# Patient Record
Sex: MALE | Race: WHITE | NOT HISPANIC OR LATINO | Employment: OTHER | URBAN - METROPOLITAN AREA
[De-identification: names, ages, dates, MRNs, and addresses within clinical notes are randomized per-mention and may not be internally consistent; named-entity substitution may affect disease eponyms.]

---

## 2022-03-08 ENCOUNTER — APPOINTMENT (OUTPATIENT)
Dept: CT IMAGING | Facility: HOSPITAL | Age: 77
DRG: 163 | End: 2022-03-08
Attending: EMERGENCY MEDICINE
Payer: MEDICARE

## 2022-03-08 ENCOUNTER — HOSPITAL ENCOUNTER (EMERGENCY)
Facility: HOSPITAL | Age: 77
Discharge: ANOTHER HOSPITAL | DRG: 163 | End: 2022-03-08
Attending: EMERGENCY MEDICINE | Admitting: STUDENT IN AN ORGANIZED HEALTH CARE EDUCATION/TRAINING PROGRAM
Payer: MEDICARE

## 2022-03-08 ENCOUNTER — APPOINTMENT (OUTPATIENT)
Dept: RADIOLOGY | Facility: HOSPITAL | Age: 77
DRG: 163 | End: 2022-03-08
Attending: EMERGENCY MEDICINE
Payer: MEDICARE

## 2022-03-08 ENCOUNTER — HOSPITAL ENCOUNTER (INPATIENT)
Facility: HOSPITAL | Age: 77
LOS: 13 days | Discharge: NURSING, REHAB OR LONG-TERM CARE | DRG: 163 | End: 2022-03-21
Attending: EMERGENCY MEDICINE | Admitting: HOSPITALIST
Payer: MEDICARE

## 2022-03-08 VITALS
TEMPERATURE: 99.1 F | BODY MASS INDEX: 24.11 KG/M2 | OXYGEN SATURATION: 93 % | SYSTOLIC BLOOD PRESSURE: 119 MMHG | HEART RATE: 85 BPM | RESPIRATION RATE: 16 BRPM | WEIGHT: 168 LBS | DIASTOLIC BLOOD PRESSURE: 61 MMHG

## 2022-03-08 DIAGNOSIS — G96.08 SUBDURAL HYGROMA: ICD-10-CM

## 2022-03-08 DIAGNOSIS — I10 HYPERTENSION, UNSPECIFIED TYPE: ICD-10-CM

## 2022-03-08 DIAGNOSIS — J90 PLEURAL EFFUSION: ICD-10-CM

## 2022-03-08 DIAGNOSIS — J90 PLEURAL EFFUSION ON LEFT: ICD-10-CM

## 2022-03-08 DIAGNOSIS — S01.91XA TRAUMATIC HEAD INJURY WITH MULTIPLE LACERATIONS, INITIAL ENCOUNTER: ICD-10-CM
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
## 2022-03-08 DIAGNOSIS — S01.01XA SCALP LACERATION, INITIAL ENCOUNTER: ICD-10-CM

## 2022-03-08 DIAGNOSIS — S09.90XA TRAUMATIC HEAD INJURY WITH MULTIPLE LACERATIONS, INITIAL ENCOUNTER: ICD-10-CM

## 2022-03-08 DIAGNOSIS — F41.9 ANXIETY: ICD-10-CM
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
## 2022-03-08 DIAGNOSIS — J86.9 EMPYEMA OF LEFT PLEURAL SPACE (H): ICD-10-CM

## 2022-03-08 DIAGNOSIS — S09.90XA HEAD INJURY, INITIAL ENCOUNTER: ICD-10-CM

## 2022-03-08 LAB
ALBUMIN SERPL-MCNC: 1.7 G/DL (ref 3.5–5)
ALP SERPL-CCNC: 94 U/L (ref 45–120)
ALT SERPL W P-5'-P-CCNC: 38 U/L (ref 0–45)
ANION GAP SERPL CALCULATED.3IONS-SCNC: 12 MMOL/L (ref 5–18)
APTT PPP: 32 SECONDS (ref 22–38)
AST SERPL W P-5'-P-CCNC: 46 U/L (ref 0–40)
BASOPHILS # BLD AUTO: 0 10E3/UL (ref 0–0.2)
BASOPHILS NFR BLD AUTO: 0 %
BILIRUB SERPL-MCNC: 0.5 MG/DL (ref 0–1)
BUN SERPL-MCNC: 9 MG/DL (ref 8–28)
CALCIUM SERPL-MCNC: 8.6 MG/DL (ref 8.5–10.5)
CHLORIDE BLD-SCNC: 105 MMOL/L (ref 98–107)
CO2 SERPL-SCNC: 20 MMOL/L (ref 22–31)
CREAT SERPL-MCNC: 0.68 MG/DL (ref 0.7–1.3)
EOSINOPHIL # BLD AUTO: 0 10E3/UL (ref 0–0.7)
EOSINOPHIL NFR BLD AUTO: 0 %
ERYTHROCYTE [DISTWIDTH] IN BLOOD BY AUTOMATED COUNT: 17.9 % (ref 10–15)
GFR SERPL CREATININE-BSD FRML MDRD: >90 ML/MIN/1.73M2
GLUCOSE BLD-MCNC: 129 MG/DL (ref 70–125)
HCT VFR BLD AUTO: 27.7 % (ref 40–53)
HGB BLD-MCNC: 8 G/DL (ref 13.3–17.7)
IMM GRANULOCYTES # BLD: 0.1 10E3/UL
IMM GRANULOCYTES NFR BLD: 1 %
INR PPP: 1.33 (ref 0.9–1.15)
LYMPHOCYTES # BLD AUTO: 0.9 10E3/UL (ref 0.8–5.3)
LYMPHOCYTES NFR BLD AUTO: 8 %
MCH RBC QN AUTO: 23.1 PG (ref 26.5–33)
MCHC RBC AUTO-ENTMCNC: 28.9 G/DL (ref 31.5–36.5)
MCV RBC AUTO: 80 FL (ref 78–100)
MONOCYTES # BLD AUTO: 0.8 10E3/UL (ref 0–1.3)
MONOCYTES NFR BLD AUTO: 7 %
NEUTROPHILS # BLD AUTO: 9.4 10E3/UL (ref 1.6–8.3)
NEUTROPHILS NFR BLD AUTO: 84 %
NRBC # BLD AUTO: 0 10E3/UL
NRBC BLD AUTO-RTO: 0 /100
PLATELET # BLD AUTO: 452 10E3/UL (ref 150–450)
POTASSIUM BLD-SCNC: 3.9 MMOL/L (ref 3.5–5)
PROCALCITONIN SERPL-MCNC: 0.11 NG/ML (ref 0–0.49)
PROT SERPL-MCNC: 7.7 G/DL (ref 6–8)
RBC # BLD AUTO: 3.46 10E6/UL (ref 4.4–5.9)
SARS-COV-2 RNA RESP QL NAA+PROBE: NEGATIVE
SODIUM SERPL-SCNC: 137 MMOL/L (ref 136–145)
WBC # BLD AUTO: 11.1 10E3/UL (ref 4–11)

## 2022-03-08 PROCEDURE — 85025 COMPLETE CBC W/AUTO DIFF WBC: CPT | Performed by: EMERGENCY MEDICINE

## 2022-03-08 PROCEDURE — 72125 CT NECK SPINE W/O DYE: CPT

## 2022-03-08 PROCEDURE — 12002 RPR S/N/AX/GEN/TRNK2.6-7.5CM: CPT

## 2022-03-08 PROCEDURE — 99223 1ST HOSP IP/OBS HIGH 75: CPT | Mod: AI | Performed by: HOSPITALIST

## 2022-03-08 PROCEDURE — C9803 HOPD COVID-19 SPEC COLLECT: HCPCS

## 2022-03-08 PROCEDURE — 70450 CT HEAD/BRAIN W/O DYE: CPT

## 2022-03-08 PROCEDURE — 99285 EMERGENCY DEPT VISIT HI MDM: CPT | Mod: 25

## 2022-03-08 PROCEDURE — 99285 EMERGENCY DEPT VISIT HI MDM: CPT

## 2022-03-08 PROCEDURE — 71045 X-RAY EXAM CHEST 1 VIEW: CPT

## 2022-03-08 PROCEDURE — 250N000013 HC RX MED GY IP 250 OP 250 PS 637: Performed by: HOSPITALIST

## 2022-03-08 PROCEDURE — 84145 PROCALCITONIN (PCT): CPT | Performed by: EMERGENCY MEDICINE

## 2022-03-08 PROCEDURE — 80053 COMPREHEN METABOLIC PANEL: CPT | Performed by: EMERGENCY MEDICINE

## 2022-03-08 PROCEDURE — 36415 COLL VENOUS BLD VENIPUNCTURE: CPT | Performed by: EMERGENCY MEDICINE

## 2022-03-08 PROCEDURE — 85610 PROTHROMBIN TIME: CPT | Performed by: EMERGENCY MEDICINE

## 2022-03-08 PROCEDURE — 87635 SARS-COV-2 COVID-19 AMP PRB: CPT | Performed by: EMERGENCY MEDICINE

## 2022-03-08 PROCEDURE — 0W9B30Z DRAINAGE OF LEFT PLEURAL CAVITY WITH DRAINAGE DEVICE, PERCUTANEOUS APPROACH: ICD-10-PCS | Performed by: RADIOLOGY

## 2022-03-08 PROCEDURE — 99207 PR CDG-CODE CATEGORY CHANGED: CPT | Performed by: HOSPITALIST

## 2022-03-08 PROCEDURE — 250N000009 HC RX 250: Performed by: EMERGENCY MEDICINE

## 2022-03-08 PROCEDURE — 0HQ0XZZ REPAIR SCALP SKIN, EXTERNAL APPROACH: ICD-10-PCS | Performed by: EMERGENCY MEDICINE

## 2022-03-08 PROCEDURE — 85730 THROMBOPLASTIN TIME PARTIAL: CPT | Performed by: EMERGENCY MEDICINE

## 2022-03-08 PROCEDURE — 250N000011 HC RX IP 250 OP 636: Performed by: EMERGENCY MEDICINE

## 2022-03-08 PROCEDURE — 120N000001 HC R&B MED SURG/OB

## 2022-03-08 RX ORDER — AMOXICILLIN 250 MG
1 CAPSULE ORAL 2 TIMES DAILY
Status: DISCONTINUED | OUTPATIENT
Start: 2022-03-08 | End: 2022-03-18

## 2022-03-08 RX ORDER — ONDANSETRON 2 MG/ML
4 INJECTION INTRAMUSCULAR; INTRAVENOUS EVERY 6 HOURS PRN
Status: DISCONTINUED | OUTPATIENT
Start: 2022-03-08 | End: 2022-03-08 | Stop reason: HOSPADM

## 2022-03-08 RX ORDER — ACETAMINOPHEN 325 MG/1
975 TABLET ORAL EVERY 8 HOURS
Status: DISCONTINUED | OUTPATIENT
Start: 2022-03-08 | End: 2022-03-08 | Stop reason: CLARIF

## 2022-03-08 RX ORDER — SERTRALINE HYDROCHLORIDE 25 MG/1
25 TABLET, FILM COATED ORAL DAILY
Status: ON HOLD | COMMUNITY
Start: 2022-01-27 | End: 2022-03-18

## 2022-03-08 RX ORDER — DILTIAZEM HYDROCHLORIDE 120 MG/1
240 CAPSULE, COATED, EXTENDED RELEASE ORAL DAILY
Status: DISCONTINUED | OUTPATIENT
Start: 2022-03-08 | End: 2022-03-08 | Stop reason: CLARIF

## 2022-03-08 RX ORDER — ISOSORBIDE MONONITRATE 30 MG/1
30 TABLET, EXTENDED RELEASE ORAL DAILY
Status: DISCONTINUED | OUTPATIENT
Start: 2022-03-08 | End: 2022-03-08 | Stop reason: CLARIF

## 2022-03-08 RX ORDER — LORAZEPAM 2 MG/1
2 TABLET ORAL
Status: ON HOLD | COMMUNITY
Start: 2020-12-28 | End: 2022-03-18

## 2022-03-08 RX ORDER — ACETAMINOPHEN 325 MG/1
975 TABLET ORAL EVERY 8 HOURS
Status: DISCONTINUED | OUTPATIENT
Start: 2022-03-08 | End: 2022-03-21 | Stop reason: HOSPADM

## 2022-03-08 RX ORDER — ONDANSETRON 4 MG/1
4 TABLET, ORALLY DISINTEGRATING ORAL EVERY 6 HOURS PRN
Status: DISCONTINUED | OUTPATIENT
Start: 2022-03-08 | End: 2022-03-08 | Stop reason: HOSPADM

## 2022-03-08 RX ORDER — METOPROLOL TARTRATE 25 MG/1
25 TABLET, FILM COATED ORAL 2 TIMES DAILY
Status: DISCONTINUED | OUTPATIENT
Start: 2022-03-08 | End: 2022-03-08 | Stop reason: CLARIF

## 2022-03-08 RX ORDER — AMOXICILLIN 250 MG
2 CAPSULE ORAL 2 TIMES DAILY
Status: DISCONTINUED | OUTPATIENT
Start: 2022-03-08 | End: 2022-03-18

## 2022-03-08 RX ORDER — AMLODIPINE BESYLATE 5 MG/1
5 TABLET ORAL DAILY
Status: DISCONTINUED | OUTPATIENT
Start: 2022-03-08 | End: 2022-03-15

## 2022-03-08 RX ORDER — ONDANSETRON 4 MG/1
4 TABLET, ORALLY DISINTEGRATING ORAL EVERY 6 HOURS PRN
Status: DISCONTINUED | OUTPATIENT
Start: 2022-03-08 | End: 2022-03-21 | Stop reason: HOSPADM

## 2022-03-08 RX ORDER — ATENOLOL 25 MG/1
25 TABLET ORAL DAILY
Status: ON HOLD | COMMUNITY
Start: 2021-02-23 | End: 2022-03-18

## 2022-03-08 RX ORDER — SERTRALINE HYDROCHLORIDE 25 MG/1
25 TABLET, FILM COATED ORAL DAILY
Status: DISCONTINUED | OUTPATIENT
Start: 2022-03-08 | End: 2022-03-21 | Stop reason: HOSPADM

## 2022-03-08 RX ORDER — BACITRACIN ZINC 500 [USP'U]/G
OINTMENT TOPICAL ONCE
Status: COMPLETED | OUTPATIENT
Start: 2022-03-08 | End: 2022-03-08

## 2022-03-08 RX ORDER — ONDANSETRON 2 MG/ML
4 INJECTION INTRAMUSCULAR; INTRAVENOUS EVERY 6 HOURS PRN
Status: DISCONTINUED | OUTPATIENT
Start: 2022-03-08 | End: 2022-03-21 | Stop reason: HOSPADM

## 2022-03-08 RX ORDER — LORAZEPAM 1 MG/1
2 TABLET ORAL
Status: DISCONTINUED | OUTPATIENT
Start: 2022-03-08 | End: 2022-03-21 | Stop reason: HOSPADM

## 2022-03-08 RX ORDER — AMLODIPINE BESYLATE 5 MG/1
5 TABLET ORAL DAILY
Status: ON HOLD | COMMUNITY
End: 2022-03-18

## 2022-03-08 RX ORDER — ATENOLOL 25 MG/1
25 TABLET ORAL DAILY
Status: DISCONTINUED | OUTPATIENT
Start: 2022-03-08 | End: 2022-03-21 | Stop reason: HOSPADM

## 2022-03-08 RX ADMIN — BACITRACIN ZINC 0.9 G: 500 OINTMENT TOPICAL at 04:04

## 2022-03-08 RX ADMIN — ATENOLOL 25 MG: 25 TABLET ORAL at 16:45

## 2022-03-08 RX ADMIN — LORAZEPAM 2 MG: 1 TABLET ORAL at 16:46

## 2022-03-08 RX ADMIN — EPINEPHRINE BITARTRATE 3 ML: 1 POWDER at 02:50

## 2022-03-08 RX ADMIN — ACETAMINOPHEN 975 MG: 325 TABLET ORAL at 16:45

## 2022-03-08 RX ADMIN — AMLODIPINE BESYLATE 5 MG: 5 TABLET ORAL at 16:45

## 2022-03-08 RX ADMIN — BACITRACIN ZINC 0.9 G: 500 OINTMENT TOPICAL at 02:50

## 2022-03-08 RX ADMIN — ACETAMINOPHEN 975 MG: 325 TABLET ORAL at 23:58

## 2022-03-08 ASSESSMENT — ACTIVITIES OF DAILY LIVING (ADL)
TOILETING_ISSUES: NO
VISION_MANAGEMENT: HOME
NUMBER_OF_TIMES_PATIENT_HAS_FALLEN_WITHIN_LAST_SIX_MONTHS: 1
COMMUNICATION: DIFFICULTY UNDERSTANDING
ADLS_ACUITY_SCORE: 12
ADLS_ACUITY_SCORE: 8
WALKING_OR_CLIMBING_STAIRS_DIFFICULTY: NO
DIFFICULTY_COMMUNICATING: YES
ADLS_ACUITY_SCORE: 12
WEAR_GLASSES_OR_BLIND: OTHER (SEE COMMENTS)
ADLS_ACUITY_SCORE: 6
ADLS_ACUITY_SCORE: 8
DOING_ERRANDS_INDEPENDENTLY_DIFFICULTY: NO
ADLS_ACUITY_SCORE: 8
CHANGE_IN_FUNCTIONAL_STATUS_SINCE_ONSET_OF_CURRENT_ILLNESS/INJURY: NO
DIFFICULTY_EATING/SWALLOWING: NO
DRESSING/BATHING_DIFFICULTY: NO
ADLS_ACUITY_SCORE: 8
HEARING_DIFFICULTY_OR_DEAF: NO
ADLS_ACUITY_SCORE: 8
ADLS_ACUITY_SCORE: 10
ADLS_ACUITY_SCORE: 8
FALL_HISTORY_WITHIN_LAST_SIX_MONTHS: YES
ADLS_ACUITY_SCORE: 12
ADLS_ACUITY_SCORE: 8
ADLS_ACUITY_SCORE: 10
ADLS_ACUITY_SCORE: 8
ADLS_ACUITY_SCORE: 8
CONCENTRATING,_REMEMBERING_OR_MAKING_DECISIONS_DIFFICULTY: YES
DEPENDENT_IADLS:: CLEANING;COOKING;LAUNDRY;SHOPPING;MEAL PREPARATION;MEDICATION MANAGEMENT;TRANSPORTATION
ADLS_ACUITY_SCORE: 8

## 2022-03-08 ASSESSMENT — ENCOUNTER SYMPTOMS: WOUND: 1

## 2022-03-08 NOTE — ED NOTES
"Patient is calm and cooperative sitting in recliner. Provided patient with a pop to drink and gave him a white stuffed lamb. Told patient that \"Mari\" (staff at group home) wanted to check in on him and wanted him to listen to hospital staff while he is here. Patient was happy and appreciative of the pop and lamb.     "

## 2022-03-08 NOTE — ED NOTES
See chart MRN: [id number] Tha Lazaro for any further information. Group submitted wrong face sheet on pt arrival. This is the correct chart

## 2022-03-08 NOTE — PHARMACY-ADMISSION MEDICATION HISTORY
Pharmacy Note - Admission Medication History    Pertinent Provider Information: information provided by the group home     ______________________________________________________________________    Prior To Admission (PTA) med list completed and updated in EMR.       PTA Med List   Medication Sig Last Dose     amLODIPine (NORVASC) 5 MG tablet Take 5 mg by mouth daily  3/7/2022 at Unknown time     atenolol (TENORMIN) 25 MG tablet Take 25 mg by mouth daily  3/7/2022 at Unknown time     LORazepam (ATIVAN) 2 MG tablet Take 2 mg by mouth daily at 4pm  3/7/2022 at Unknown time     sertraline (ZOLOFT) 25 MG tablet Take 25 mg by mouth daily  3/7/2022 at Unknown time       Information source(s): Caregiver and [facility]  Method of interview communication: phone    Summary of Changes to PTA Med List  No PTA medications on file prior to this encounter.    Allergies were reviewed, assessed, and updated with the patient.      Patient does not use any multi-dose medications prior to admission.    The information provided in this note is only as accurate as the sources available at the time of the update(s).    Thank you for the opportunity to participate in the care of this patient.    Cecilia Modi RPH  3/8/2022 10:43 AM

## 2022-03-08 NOTE — ED TRIAGE NOTES
BIBA [facility]. Pt comes from a group home. Pt was going to the bathroom when he fell and hit his head. Pt has a laceration on the top of his head and above the right eye. Hx of mitul. EMS reported no blood thinners but Xarelto is listed on pt's medication list. Trauma alert was called per dr. Lujan's advice.

## 2022-03-08 NOTE — H&P
.  [facility]    History and Physical - Hospitalist Service       Date of Admission:  3/8/2022    Assessment & Plan      Perry Betancourt is a 76 year old male admitted on 3/8/2022. He has history of hypertension, mood disorder presented from group home status post mechanical fall and laceration on his head.      Status post mechanical fall  Head injury secondary to fall  -Patient presented with mechanical fall from his group home.   CT scan of the head showed questionable subdural hygroma measuring up to 0.9 cm  versus prominence of subarachnoid space overlying the left parietal lobe.  CT C-spine  showed no acute fractures.   -Laceration on his head has been repaired in the ED.  -Place patient on fall precautions      Left-sided fluid effusion  -This was initially seen on the CT C-spine which showed pleural fluid filling the left lung apex  and was not incompletely imaged.  -Chest x-ray was obtained which showed moderate to marked left sided pleural fluid  collection .  -Patient declines any shortness of breath, saturations stable on room air and is completely  asymptomatic.  -Can assess in a.m., likely consider thoracentesis    Hypertension-stable  -Continue PTA amlodipine, atenolol    Mood disorder  -Continue PTA sertraline, ativan    Diet: Regular  DVT Prophylaxis: Pneumatic Compression Devices  Terry Catheter: Not present  Central Lines: None  Code Status:     Disposition Plan   Expected Discharge: 1-2 days.   Anticipated discharge location:  Awaiting care coordination huddle      The patient's care was discussed with the Bedside Nurse.    Ofelia Higuera MD  [facility]  ______________________________________________________________________    Chief Complaint   Fall     History is obtained from chart review    History of Present Illness   Perry Betancourt is a 76 year old male with history of hypertension, mood disorder who is a resident of a group home presented to  "ED secondary to fall and a head laceration.  Patient's group home sent wrong facesheet with wrong details of the patient, hence patient's ED documentation, labs and CT head, CT C-spine and chest x-ray can be reviewed under MRN [id number], under patient's name Tha Lazaro \"Shilo\" for today's visit.     Patient is a poor historian, also hard to understand speech.  Patient states he has tried to go to the bathroom and he fell and hit his head to the ground with the wound on top of his head.  Denies any loss of consciousness.  Denies any pain anywhere.  Denies any shortness of breath, fever, cough.  No abdominal pain, nausea or vomiting.  No diarrhea or constipation.  No any other injuries reported.    In ER, on arrival patient is hemodynamically stable.  Labs are unremarkable except for mild leukocytosis with WBC of 11.1.  Covid test was negative.  Chest x-ray showed moderate to marked left-sided pleural effusion.  Atelectasis involving remaining portions of the leg.    CT head showed no any acute traumatic intracranial process.  There is questionable subdural hygroma measuring up to 0.9 cm versus prominence of the subarachnoid space overlying the left parietal lobe.  CT C-spine showed no acute fractures.    Review of Systems    Limited due to pt being poor historian    Past Medical History    Hypertension, Mood disorder    Past Surgical History   Reviewed and non contributory    Social History   I have reviewed this patient's social history and updated it with pertinent information if needed.  Social History     Tobacco Use     Smoking status: Not on file     Smokeless tobacco: Not on file   Substance Use Topics     Alcohol use: Not on file     Drug use: Not on file       Family History   Reviewed and non contributory    Prior to Admission Medications   Prior to Admission Medications   Prescriptions Last Dose Informant Patient Reported? Taking?   LORazepam (ATIVAN) 2 MG tablet 3/7/2022 at Unknown time  Yes Yes "   Sig: Take 2 mg by mouth daily at 4pm    amLODIPine (NORVASC) 5 MG tablet 3/7/2022 at Unknown time  Yes Yes   Sig: Take 5 mg by mouth daily    atenolol (TENORMIN) 25 MG tablet 3/7/2022 at Unknown time  Yes Yes   Sig: Take 25 mg by mouth daily    sertraline (ZOLOFT) 25 MG tablet 3/7/2022 at Unknown time  Yes Yes   Sig: Take 25 mg by mouth daily       Facility-Administered Medications: None     Allergies   No Known Allergies    Physical Exam   Vital Signs:     BP: 109/56 Pulse: 88   Resp: 14 SpO2: 97 % O2 Device: None (Room air)    Weight: 0 lbs 0 oz    General:  Appears stated age, no acute distress. A&O x 3.  Skin: Laceration on head, covered in dressing.  HEENT:  Normocephalic, laceration +, s/p repair  Neck:  Supple.  Chest:  Breath sounds CTA and no increased work of breathing on room air.  Cardiovascular:  RRR. No peripheral edema.  Abdomen:  Soft, non-tender, non-distended.  Musculoskeletal:  Moves all four extremities. No muscle atrophy.  Neurological:  CN 2-12 grossly intact.    Data   Data reviewed today: I reviewed all medications, new labs and imaging results over the last 24 hours.      Chest Xray:                                                                   IMPRESSION: Moderate-marked left-sided pleural fluid collection with minimal to moderate amount of remaining aerated lung in the left perihilar region. Atelectasis involving the remaining portions of the lung. Right lung clear. Heart size is unable to be assessed due to pleural fluid. Normal pulmonary vascularity.    CT head:  IMPRESSION:  1.  No definite acute traumatic intracranial process. Question subdural hygroma measuring up to 0.9 cm versus prominence of the subarachnoid space overlying the left parietal lobe.  2.  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 If the patient is experiencing an acute, focal or ongoing neurologic deficit, an MRI may be indicated.  3.  Chronic senescent and presumed  microvascular ischemic changes, as above.    CT C spine:  IMPRESSION:  1.  No fracture or posttraumatic subluxation.  2.  Degenerative changes, as above.  3.  Pleural fluid filling the left lung apex, incompletely imaged.

## 2022-03-08 NOTE — CONSULTS
Care Management Initial Consult    General Information  Assessment completed with: Patient, Caregiver, Other (RN Osiris), pt, RN osiris, caregiver Alan  Type of CM/SW Visit: Initial Assessment    Primary Care Provider verified and updated as needed: Yes   Readmission within the last 30 days: no previous admission in last 30 days   Return Category: Progression of disease  Reason for Consult: discharge planning  Advance Care Planning: Advance Care Planning Reviewed: no concerns identified          Communication Assessment  Patient's communication style: spoken language (English or Bilingual)             Cognitive  Cognitive/Neuro/Behavioral:                        Living Environment:   People in home: alone     Current living Arrangements: group home      Able to return to prior arrangements: yes  Living Arrangement Comments: [facility]    Family/Social Support:  Care provided by: self, other (see comments) (meals, medications support from caregivers)  Provides care for: no one  Marital Status: Single  Facility resident(s)/Staff          Description of Support System: Supportive, Involved    Support Assessment: Adequate family and caregiver support, Adequate social supports    Current Resources:   Patient receiving home care services: No     Community Resources: Group Home  Equipment currently used at home: none  Supplies currently used at home: Hearing Aid Batteries    Employment/Financial:  Employment Status:          Financial Concerns: No concerns identified   Referral to Financial Worker: No       Lifestyle & Psychosocial Needs:  Social Determinants of Health     Tobacco Use: Not on file   Alcohol Use: Not on file   Financial Resource Strain: Not on file   Food Insecurity: Not on file   Transportation Needs: Not on file   Physical Activity: Not on file   Stress: Not on file   Social Connections: Not on file   Intimate Partner Violence: Not on file   Depression: Not on file   Housing Stability: Not on  "file       Functional Status:  Prior to admission patient needed assistance:   Dependent ADLs:: Independent  Dependent IADLs:: Cleaning, Cooking, Laundry, Shopping, Meal Preparation, Medication Management, Transportation  Assesssment of Functional Status: Not at baseline with ADL Functioning, Not at baseline with mobility, Not at  functional baseline    Mental Health Status:  Mental Health Status: No Current Concerns       Chemical Dependency Status:                Values/Beliefs:  Spiritual, Cultural Beliefs, Holiness Practices, Values that affect care:                 Additional Information:  JUDE assessed, lives at [facility]: [facility], independent w/showers and ambulatory; needs helps w/all other ADLs. Pt still works 4 days a week. Discharge planning w/house at [phone] to discuss transport, may need WC ride.    Pt has been declining, per Alan, caregiver at the house; normally does his own laundry and works, but they are having to help him more and more w/laundry and meals and medications. Pt is walking less and losing weight. To help pt calm now and distract him, he is offered pepsi, sandwich and a teddybear. Pt also likes looking at fish in a fish tank. Alan also indicates that pt like talking about his job and he calls Alan \"Mari\", and Alan is usually able to distract him by asking him if he wants to go to the hospital. Pt doesn't like being in the hospital.    Thuy Hatch RN        "

## 2022-03-08 NOTE — ED PROVIDER NOTES
NOTE WAS COPIED AND PASTED on 3/8/22 BECAUSE THE WRONG CHART WAS STARTED ON THIS PATIENT WITH THE WRONG NAME AND MRN. PATIENT WAS SEEN BY DR OROURKE. I WAS NOT INVOLVED IN HIS CARE.      NAME: Tha Lazaro  AGE: 71 year old male  YOB: 1950  MRN: [id number]  EVALUATION DATE & TIME: 3/8/2022  1:36 AM    PCP: Mickey Barclay    ED PROVIDER: Luis Orourke M.D.    Chief Complaint   Patient presents with     Fall     Head Laceration     FINAL IMPRESSION:  1. Fall, initial encounter    2. Head injury, initial encounter    3. Scalp laceration, initial encounter    4. Pleural effusion on left    5. Subdural hygroma      MEDICAL DECISION MAKIN:41 AM I met with the patient, obtained history, performed an initial exam, and discussed options and plan for diagnostics and treatment here in the ED. PPE: N95 mask and gloves  2:48 AM I rechecked and updated the patient.   3:16 AM I spoke with [facility] regarding patient.   3:55 AM The patient's laceration was stapled without complication. Discussed plans for discharge and patient was agreeable.     Patient was clinically assessed and consented to treatment. After assessment, medical decision making and workup were discussed with the patient. The patient was agreeable to plan for testing, workup, and treatment.  Pertinent Labs & Imaging studies reviewed. (See chart for details)     Tha Lazaro is a 71 year old male who presents with fall and head injury.   Differential diagnosis includes but not limited to intracranial hemorrhage, skull fracture, cephalhematoma, scalp laceration, mechanical fall.  Patient with mechanical fall night and is on Xarelto per his chart review for control atrial flutter.  Patient did not lose consciousness nor was a syncopal episode.  He denies any pain except from the top of his head where he has a wound.  Patient does have a contusion with laceration on top of the scalp where he must of struck the cabinet.   No evidence of galea and wound will be cleansed and plan for repair.  Due to the blood thinners patient will be sent for CT scan of the head and neck.  These are pending at this time and will plan to repair wound.  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  CT scan of the neck showed no fractures but did show a incidental finding of a left-sided pleural effusion.  Will get chest x-ray to better define this however patient has no tenderness on the chest nor any shortness of breath.  If this was hemothorax and acutely related to trauma and expect tenderness on that rib area and acute shortness of breath however patient does not have either.  Chest x-ray did show significant effusion on the left side.  He is not tender there and as documented lung sounds were diminished over there however likely more significant than exam shows.  He does report some decreased energy however this is significantly changed from chest x-ray at outside clinic on September 25.  Given his smoking history and COPD lung cancer and a metastatic effusion is a concern.  Additionally this could be infectious and the labs were obtained.  CBC did show slight leukocytosis otherwise expect with that much effusion white count would be higher.  Coagulation studies were slightly elevated consistent with the Xarelto and metabolic panel unremarkable.  COVID-19 was negative and will plan for admission for likely thoracentesis and work-up of incidental pleural effusion finding.  Patient discussed with hospitalist, Dr. Hernandez for admission.    0 minutes of critical care time    MEDICATIONS GIVEN IN THE EMERGENCY:  Medications   bacitracin ointment (0.9 g Topical Given 3/8/22 0250)   lidocaine/EPINEPHrine/tetracaine (LET) solution KIT 3  mL (3 mLs Topical Given 3/8/22 0250)   bacitracin ointment (0.9 g Topical Given 3/8/22 7776)       NEW PRESCRIPTIONS STARTED AT TODAY'S ER VISIT:  New Prescriptions    No medications on file          =================================================================    HPI    Patient information was obtained from: Patient    Use of : N/A        Tha Lazaro is a 71 year old male with a past medical history of CVA and atrial fibrillation with RVR, who presents to the ED via EMS for evaluation of a head laceration secondary to a fall.     Per immunization records, patient's last tetanus shot was in 2016.    Patient presents from a group home with complaints of a head laceration. He states he was trying to go to the bathroom when he fell, sustaining a laceration to the top of his head. Per triage note, EMS reported no anticoagulants but Xarelto is listed on his medication list. He denies loss of consciousness. Denies any other pain or complaints at this time.    REVIEW OF SYSTEMS   Review of Systems   Constitutional:        Positive for a fall   Skin: Positive for wound (head laceration).   All other systems reviewed and are negative.     PAST MEDICAL HISTORY:  Past Medical History:   Diagnosis Date     CVA (cerebral vascular accident) (H) 2005     Depression      Typical atrial flutter (H)        PAST SURGICAL HISTORY:  Past Surgical History:   Procedure Laterality Date     EP ABLATION ATRIAL FLUTTER N/A 9/23/2020    Procedure: EP Ablation Atrial Flutter;  Surgeon: Reyna Strange MD;  Location:  [facility]       CURRENT MEDICATIONS:    No current facility-administered medications for this encounter.    Current Outpatient Medications:      atorvastatin (LIPITOR) 80 MG tablet, Take 80 mg by mouth At Bedtime, Disp: , Rfl:      calcipotriene (DOVONOX) 0.005 % external cream, Apply topically every morning , Disp: , Rfl:      diltiazem ER COATED BEADS (CARDIZEM CD/CARTIA XT) 240 MG  24 hr capsule, Take 1 capsule (240 mg) by mouth daily, Disp: 30 capsule, Rfl: 0     isosorbide mononitrate (IMDUR) 30 MG 24 hr tablet, Take 1.5 tablets (45) by mouth every morning, Disp: 45 tablet, Rfl: 11     metoprolol tartrate (LOPRESSOR) 25 MG tablet, Take 1 tablet (25 mg) by mouth 2 times daily, Disp: 180 tablet, Rfl: 0     metoprolol tartrate (LOPRESSOR) 25 MG tablet, Take 0.5 tablets (12.5 mg) by mouth 2 times daily as needed (palpitations >30 minutes) This is in addition to 25 mg twice daily., Disp: 180 tablet, Rfl: 0     metroNIDAZOLE (METROGEL) 1 % external gel, Apply topically daily, Disp: , Rfl:      nitroGLYcerin (NITROSTAT) 0.4 MG sublingual tablet, For chest pain place 1 tablet under the tongue every 5 minutes for 3 doses. If symptoms persist 5 minutes after 1st dose call 911., Disp: 25 tablet, Rfl: 4     rivaroxaban ANTICOAGULANT (XARELTO) 20 MG TABS tablet, Take 1 tablet (20 mg) by mouth daily (with dinner), Disp: 30 tablet, Rfl: 0    ALLERGIES:  No Known Allergies    FAMILY HISTORY:  History reviewed. No pertinent family history.    SOCIAL HISTORY:   Social History     Socioeconomic History     Marital status: Single     Spouse name: Not on file     Number of children: Not on file     Years of education: Not on file     Highest education level: Not on file   Occupational History     Not on file   Tobacco Use     Smoking status: Former Smoker     Types: Cigarettes     Quit date: 2014     Years since quittin.1     Smokeless tobacco: Never Used   Substance and Sexual Activity     Alcohol use: Not Currently     Comment: Quit around      Drug use: Not on file     Sexual activity: Not on file   Other Topics Concern     Parent/sibling w/ CABG, MI or angioplasty before 65F 55M? Not Asked   Social History Narrative     Not on file     Social Determinants of Health     Financial Resource Strain: Not on file   Food Insecurity: Not on file   Transportation Needs: Not on file   Physical Activity:  Not on file   Stress: Not on file   Social Connections: Not on file   Intimate Partner Violence: Not on file   Housing Stability: Not on file       PHYSICAL EXAM:    Vitals: /65   Pulse 89   Temp 100.4  F (38  C) (Oral)   Resp 16   Wt 76.2 kg (168 lb)   SpO2 92%   BMI 24.11 kg/m     Physical Exam  Vitals and nursing note reviewed.   Constitutional:       General: He is not in acute distress.     Appearance: Normal appearance. He is normal weight. He is not ill-appearing or toxic-appearing.   HENT:      Head: Normocephalic. Contusion and laceration present.        Nose: Nose normal.      Mouth/Throat:      Mouth: Mucous membranes are moist.      Pharynx: Oropharynx is clear.   Eyes:      Extraocular Movements: Extraocular movements intact.      Pupils: Pupils are equal, round, and reactive to light.   Cardiovascular:      Rate and Rhythm: Normal rate and regular rhythm.      Heart sounds: Normal heart sounds.   Pulmonary:      Effort: Pulmonary effort is normal. No tachypnea, accessory muscle usage, prolonged expiration or respiratory distress.      Breath sounds: Examination of the left-middle field reveals decreased breath sounds. Examination of the left-lower field reveals decreased breath sounds. Decreased breath sounds present. No wheezing, rhonchi or rales.   Chest:      Chest wall: No tenderness.   Abdominal:      Palpations: Abdomen is soft.      Tenderness: There is no abdominal tenderness.   Musculoskeletal:         General: No signs of injury.      Cervical back: Normal range of motion and neck supple. No tenderness.   Skin:     General: Skin is warm and dry.      Capillary Refill: Capillary refill takes less than 2 seconds.      Coloration: Skin is not pale.      Findings: No erythema.   Neurological:      Mental Status: He is alert and oriented to person, place, and time. Mental status is at baseline.      Cranial Nerves: No cranial nerve deficit.      Coordination: Coordination normal.    Psychiatric:         Mood and Affect: Mood normal.        LAB:  All pertinent labs reviewed and interpreted.  Labs Ordered and Resulted from Time of ED Arrival to Time of ED Departure   INR - Abnormal       Result Value    INR 1.33 (*)    COMPREHENSIVE METABOLIC PANEL - Abnormal    Sodium 137      Potassium 3.9      Chloride 105      Carbon Dioxide (CO2) 20 (*)     Anion Gap 12      Urea Nitrogen 9      Creatinine 0.68 (*)     Calcium 8.6      Glucose 129 (*)     Alkaline Phosphatase 94      AST 46 (*)     ALT 38      Protein Total 7.7      Albumin 1.7 (*)     Bilirubin Total 0.5      GFR Estimate >90     CBC WITH PLATELETS AND DIFFERENTIAL - Abnormal    WBC Count 11.1 (*)     RBC Count 3.46 (*)     Hemoglobin 8.0 (*)     Hematocrit 27.7 (*)     MCV 80      MCH 23.1 (*)     MCHC 28.9 (*)     RDW 17.9 (*)     Platelet Count 452 (*)     % Neutrophils 84      % Lymphocytes 8      % Monocytes 7      % Eosinophils 0      % Basophils 0      % Immature Granulocytes 1      NRBCs per 100 WBC 0      Absolute Neutrophils 9.4 (*)     Absolute Lymphocytes 0.9      Absolute Monocytes 0.8      Absolute Eosinophils 0.0      Absolute Basophils 0.0      Absolute Immature Granulocytes 0.1      Absolute NRBCs 0.0     PARTIAL THROMBOPLASTIN TIME - Normal    aPTT 32     COVID-19 VIRUS (CORONAVIRUS) BY PCR - Normal    SARS CoV2 PCR Negative     PROCALCITONIN       RADIOLOGY:  XR Chest Port 1 View   Final Result   IMPRESSION: Moderate-marked left-sided pleural fluid collection with minimal to moderate amount of remaining aerated lung in the left perihilar region. Atelectasis involving the remaining portions of the lung. Right lung clear. Heart size is unable to be    assessed due to pleural fluid. Normal pulmonary vascularity.      CT Cervical Spine w/o Contrast   Final Result   IMPRESSION:   1.  No fracture or posttraumatic subluxation.   2.  Degenerative changes, as above.   3.  Pleural fluid filling the left lung apex, incompletely  imaged.      CT Head w/o Contrast   Final Result   IMPRESSION:   1.  No definite acute traumatic intracranial process. Question subdural hygroma measuring up to 0.9 cm versus prominence of the subarachnoid space overlying the left parietal lobe.   2.  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 If the patient is experiencing an acute, focal or ongoing neurologic deficit, an MRI may be indicated.   3.  Chronic senescent and presumed microvascular ischemic changes, as above.         Dr. Cristian Hussein discussed results with DR. JAKUB LUJAN on 03/08/2022 at 3:16 AM.          EKG:   None    PROCEDURES:   Procedures     PROCEDURE: Laceration Repair   INDICATIONS: Laceration   PROCEDURE PROVIDER: Dr Jose Alejandro Lujan   SITE:  Scalp   TYPE/SIZE: simple, clean and no foreign body visualized  4 cm (total length)   FUNCTIONAL ASSESSMENT: Distal sensation, circulation and motor intact   MEDICATION: 3 mLs of 1% Lidocaine with epinephrine   PREPARATION: irrigation with Normal saline   DEBRIDEMENT: no debridement   CLOSURE:  Wound was closed in   4 staples    Total number of sutures/staples placed: 4       I, Jaqueline Whitney, am serving as a scribe to document services personally performed by Dr. Luis Lujan  based on my observation and the provider's statements to me. I, Luis Lujan MD attest that Jaqueline Whitney is acting in a scribe capacity, has observed my performance of the services and has documented them in accordance with my direction.      Luis Lujan M.D.  Emergency Medicine  [facility] Emergency Department     Luis Lujan MD  03/08/22 0625         Rachel Strickland MD  03/08/22 3930

## 2022-03-08 NOTE — ED PROVIDER NOTES
NAME: Perry Betancourt  AGE: 76 year old male  YOB: 1945  MRN: [id number]  EVALUATION DATE & TIME: 3/8/2022  1:36 AM    PCP: Mickey Barclay    ED PROVIDER: Luis Lujan M.D.    Chief Complaint   Patient presents with     Fall     Head Laceration     FINAL IMPRESSION:  1. Fall, initial encounter    2. Head injury, initial encounter    3. Scalp laceration, initial encounter    4. Pleural effusion on left    5. Subdural hygroma    6. Subdural hygroma      MEDICAL DECISION MAKIN:41 AM I met with the patient, obtained history, performed an initial exam, and discussed options and plan for diagnostics and treatment here in the ED. PPE: N95 mask and gloves  2:48 AM I rechecked and updated the patient.   3:16 AM I spoke with [facility] regarding patient.   3:55 AM The patient's laceration was stapled without complication. Discussed plans for discharge and patient was agreeable.  3/31/22 Patient demographics corrected after chart verified.     Patient was clinically assessed and consented to treatment. After assessment, medical decision making and workup were discussed with the patient. The patient was agreeable to plan for testing, workup, and treatment.  Pertinent Labs & Imaging studies reviewed. (See chart for details)     Perry Betancourt is a 76 year old male who presents with fall and head injury.   Differential diagnosis includes but not limited to intracranial hemorrhage, skull fracture, cephalhematoma, scalp laceration, mechanical fall.  Patient with mechanical fall night and is on Xarelto per his chart review for control atrial flutter.  Patient did not lose consciousness nor was a syncopal episode.  He denies any pain except from the top of his head where he has a wound.  Patient does have a contusion with laceration on top of the scalp where he must of struck the cabinet.  No evidence of galea and wound will be cleansed and plan for repair.  Due to the blood thinners  patient will be sent for CT scan of the head and neck.  These are pending at this time and will plan to repair wound.  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  CT scan of the neck showed no fractures but did show a incidental finding of a left-sided pleural effusion.  Will get chest x-ray to better define this however patient has no tenderness on the chest nor any shortness of breath.  If this was hemothorax and acutely related to trauma and expect tenderness on that rib area and acute shortness of breath however patient does not have either.  Chest x-ray did show significant effusion on the left side.  He is not tender there and as documented lung sounds were diminished over there however likely more significant than exam shows.  He does report some decreased energy however this is significantly changed from chest x-ray at outside clinic on September 25.  Given his smoking history and COPD lung cancer and a metastatic effusion is a concern.  Additionally this could be infectious and the labs were obtained.  CBC did show slight leukocytosis otherwise expect with that much effusion white count would be higher.  Coagulation studies were slightly elevated consistent with the Xarelto and metabolic panel unremarkable.  COVID-19 was negative and will plan for admission for likely thoracentesis and work-up of incidental pleural effusion finding.  Patient discussed with hospitalist, Dr. Hernandez for admission.    0 minutes of critical care time    MEDICATIONS GIVEN IN THE EMERGENCY:  Medications   bacitracin ointment (0.9 g Topical Given 3/8/22 0250)   lidocaine/EPINEPHrine/tetracaine (LET) solution KIT 3 mL (3 mLs Topical Given 3/8/22 0250)   bacitracin ointment (0.9 g Topical Given 3/8/22 0404)        NEW PRESCRIPTIONS STARTED AT TODAY'S ER VISIT:  Current Discharge Medication List      START taking these medications    Details   acetaminophen (TYLENOL) 500 MG tablet Take 2 tablets (1,000 mg) by mouth every 8 hours as needed for mild pain    Associated Diagnoses: Fall, initial encounter      amLODIPine (NORVASC) 5 MG tablet Take 1 tablet (5 mg) by mouth daily  Qty: 30 tablet, Refills: 0    Associated Diagnoses: Hypertension, unspecified type      amoxicillin-clavulanate (AUGMENTIN) 875-125 MG tablet Take 1 tablet by mouth 2 times daily for 14 days  Qty: 28 tablet, Refills: 0    Associated Diagnoses: Empyema of left pleural space (H)      atenolol (TENORMIN) 25 MG tablet Take 1 tablet (25 mg) by mouth daily  Qty: 30 tablet, Refills: 0    Associated Diagnoses: Hypertension, unspecified type      LORazepam (ATIVAN) 2 MG tablet Take 1 tablet (2 mg) by mouth daily at 4pm  Qty: 60 tablet, Refills: 0    Associated Diagnoses: Anxiety      sertraline (ZOLOFT) 25 MG tablet Take 1 tablet (25 mg) by mouth daily  Qty: 30 tablet, Refills: 0    Associated Diagnoses: Anxiety                =================================================================    HPI    Patient information was obtained from: Patient    Use of : N/A        Perry Betancourt is a 76 year old male with a past medical history of CVA and atrial fibrillation with RVR, who presents to the ED via EMS for evaluation of a head laceration secondary to a fall.     Per immunization records, patient's last tetanus shot was in 2016.    Patient presents from a group home with complaints of a head laceration. He states he was trying to go to the bathroom when he fell, sustaining a laceration to the top of his head. Per triage note, EMS reported no anticoagulants but Xarelto is listed on his medication list. He denies loss of consciousness. Denies any other pain or complaints at this time.    REVIEW OF SYSTEMS   Review of Systems   Constitutional:         Positive for a fall   Skin: Positive for wound (head laceration).   All other systems reviewed and are negative.     PAST MEDICAL HISTORY:  No past medical history on file.    PAST SURGICAL HISTORY:  Past Surgical History:   Procedure Laterality Date     THORACOSCOPY Left 3/11/2022    Procedure: LEFT THORACOSCOPY, CONVERTED TO;  Surgeon: Perry Martell MD;  Location: [facility] OR     THORACOTOMY Left 3/11/2022    Procedure: LEFT THORACOTOMY WITH DECORTICATION;  Surgeon: Perry Martell MD;  Location: [facility]       CURRENT MEDICATIONS:    No current facility-administered medications for this encounter.    Current Outpatient Medications:      acetaminophen (TYLENOL) 500 MG tablet, Take 2 tablets (1,000 mg) by mouth every 8 hours as needed for mild pain, Disp: , Rfl:      amLODIPine (NORVASC) 5 MG tablet, Take 1 tablet (5 mg) by mouth daily, Disp: 30 tablet, Rfl: 0     amoxicillin-clavulanate (AUGMENTIN) 875-125 MG tablet, Take 1 tablet by mouth 2 times daily for 14 days, Disp: 28 tablet, Rfl: 0     atenolol (TENORMIN) 25 MG tablet, Take 1 tablet (25 mg) by mouth daily, Disp: 30 tablet, Rfl: 0     LORazepam (ATIVAN) 2 MG tablet, Take 1 tablet (2 mg) by mouth daily at 4pm, Disp: 60 tablet, Rfl: 0     sertraline (ZOLOFT) 25 MG tablet, Take 1 tablet (25 mg) by mouth daily, Disp: 30 tablet, Rfl: 0    ALLERGIES:  No Known Allergies    FAMILY HISTORY:  No family history on file.    SOCIAL HISTORY:   Social History     Socioeconomic History     Marital status: Single     Spouse name: Not on file     Number of children: Not on file     Years of education: Not on file     Highest education level: Not on file   Occupational History     Not on file   Tobacco Use     Smoking status: Former Smoker     Types: Cigarettes     Quit date: 2014     Years since quittin.1     Smokeless tobacco: Never Used   Substance and Sexual Activity     Alcohol use: Not Currently     Comment: Quit around      Drug use: Not  on file     Sexual activity: Not on file   Other Topics Concern     Parent/sibling w/ CABG, MI or angioplasty before 65F 55M? Not Asked   Social History Narrative     Not on file     Social Determinants of Health     Financial Resource Strain: Not on file   Food Insecurity: Not on file   Transportation Needs: Not on file   Physical Activity: Not on file   Stress: Not on file   Social Connections: Not on file   Intimate Partner Violence: Not on file   Housing Stability: Not on file       PHYSICAL EXAM:    Vitals: /61   Pulse 85   Temp 99.1  F (37.3  C) (Oral)   Resp 16   Wt 76.2 kg (168 lb)   SpO2 93%    Physical Exam  Vitals and nursing note reviewed.   Constitutional:       General: He is not in acute distress.     Appearance: Normal appearance. He is normal weight. He is not ill-appearing or toxic-appearing.   HENT:      Head: Normocephalic. Contusion and laceration present.        Nose: Nose normal.      Mouth/Throat:      Mouth: Mucous membranes are moist.      Pharynx: Oropharynx is clear.   Eyes:      Extraocular Movements: Extraocular movements intact.      Pupils: Pupils are equal, round, and reactive to light.   Cardiovascular:      Rate and Rhythm: Normal rate and regular rhythm.      Heart sounds: Normal heart sounds.   Pulmonary:      Effort: Pulmonary effort is normal. No tachypnea, accessory muscle usage, prolonged expiration or respiratory distress.      Breath sounds: Examination of the left-middle field reveals decreased breath sounds. Examination of the left-lower field reveals decreased breath sounds. Decreased breath sounds present. No wheezing, rhonchi or rales.   Chest:      Chest wall: No tenderness.   Abdominal:      Palpations: Abdomen is soft.      Tenderness: There is no abdominal tenderness.   Musculoskeletal:         General: No signs of injury.      Cervical back: Normal range of motion and neck supple. No tenderness.   Skin:     General: Skin is warm and dry.      Capillary  Refill: Capillary refill takes less than 2 seconds.      Coloration: Skin is not pale.      Findings: No erythema.   Neurological:      Mental Status: He is alert and oriented to person, place, and time. Mental status is at baseline.      Cranial Nerves: No cranial nerve deficit.      Coordination: Coordination normal.   Psychiatric:         Mood and Affect: Mood normal.        LAB:  All pertinent labs reviewed and interpreted.  Labs Ordered and Resulted from Time of ED Arrival to Time of ED Departure   INR - Abnormal       Result Value    INR 1.33 (*)    COMPREHENSIVE METABOLIC PANEL - Abnormal    Sodium 137      Potassium 3.9      Chloride 105      Carbon Dioxide (CO2) 20 (*)     Anion Gap 12      Urea Nitrogen 9      Creatinine 0.68 (*)     Calcium 8.6      Glucose 129 (*)     Alkaline Phosphatase 94      AST 46 (*)     ALT 38      Protein Total 7.7      Albumin 1.7 (*)     Bilirubin Total 0.5      GFR Estimate >90     CBC WITH PLATELETS AND DIFFERENTIAL - Abnormal    WBC Count 11.1 (*)     RBC Count 3.46 (*)     Hemoglobin 8.0 (*)     Hematocrit 27.7 (*)     MCV 80      MCH 23.1 (*)     MCHC 28.9 (*)     RDW 17.9 (*)     Platelet Count 452 (*)     % Neutrophils 84      % Lymphocytes 8      % Monocytes 7      % Eosinophils 0      % Basophils 0      % Immature Granulocytes 1      NRBCs per 100 WBC 0      Absolute Neutrophils 9.4 (*)     Absolute Lymphocytes 0.9      Absolute Monocytes 0.8      Absolute Eosinophils 0.0      Absolute Basophils 0.0      Absolute Immature Granulocytes 0.1      Absolute NRBCs 0.0     PARTIAL THROMBOPLASTIN TIME - Normal    aPTT 32     PROCALCITONIN - Normal    Procalcitonin 0.11     COVID-19 VIRUS (CORONAVIRUS) BY PCR - Normal    SARS CoV2 PCR Negative         RADIOLOGY:  XR Chest Port 1 View   Final Result   IMPRESSION: Moderate-marked left-sided pleural fluid collection with   minimal to moderate amount of remaining aerated lung in the left   perihilar region. Atelectasis involving  the remaining portions of the   lung. Right lung clear. Heart size is unable to be assessed due to   pleural fluid. Normal pulmonary vascularity.      CT Cervical Spine w/o Contrast   Final Result   IMPRESSION:   1.  No fracture or posttraumatic subluxation.   2.  Degenerative changes, as above.   3.  Pleural fluid filling the left lung apex, incompletely imaged.      CT Head w/o Contrast   Final Result   IMPRESSION:   1.  No definite acute traumatic intracranial process. Question   subdural hygroma measuring up to 0.9 cm versus prominence of the   subarachnoid space overlying the left parietal lobe.   2.  Age-indeterminate right posterior limb of internal capsule lacunar   type infarction. If the patient is experiencing an acute, focal or   ongoing neurologic deficit, an MRI may be indicated.   3.  Chronic senescent and presumed microvascular ischemic changes, as   above.      Dr. Cristian Hussein discussed results with DR. JAKUB OROURKE on 03/08/2022   at 3:16 AM.          EKG:   None    PROCEDURES:   Procedures     PROCEDURE: Laceration Repair   INDICATIONS: Laceration   PROCEDURE PROVIDER: Dr Jose Alejandro Orourke   SITE:  Scalp   TYPE/SIZE: simple, clean and no foreign body visualized  4 cm (total length)   FUNCTIONAL ASSESSMENT: Distal sensation, circulation and motor intact   MEDICATION: 3 mLs of 1% Lidocaine with epinephrine   PREPARATION: irrigation with Normal saline   DEBRIDEMENT: no debridement   CLOSURE:  Wound was closed in   4 staples    Total number of sutures/staples placed: 4       I, Jaqueline Whitney, am serving as a scribe to document services personally performed by Dr. Luis Orourke  based on my observation and the provider's statements to me. I, Luis Orourke MD attest that Jaqueline Whitney is acting in a scribe capacity, has observed my performance of the services and has documented them in accordance with my direction.      Luis Orourke M.D.  Emergency Medicine  [facility]  Department     Luis Lujan MD  03/08/22 8895       Luis Lujan MD  03/31/22 9541

## 2022-03-08 NOTE — ED NOTES
"ER Boarding Note:    Alert.  Follows commands.  Able to make some needs known.  Keeps asking, \"who's going to pick me up?\"  On RA.  No distress.  Sats are 93%.  HR 80s.  Denies chest pain.    Denies AP.  NPO ordered at this time.    Voided in bathroom.  Pt is SBA-A1.  Somewhat unsteady but manages well.  Wound to head: staples.  No active drainage.    VS obtained: /61   Pulse 85   Temp 99.1  F (37.3  C) (Oral)   Resp 16   Wt 76.2 kg (168 lb)   SpO2 93%   BMI 24.11 kg/m  .  Plan: waiting for admission bed.    "

## 2022-03-08 NOTE — ED NOTES
See overlapping chart MRN: [id number] for more detailed/ correct information    Nursing supervisor aware, group home sent wrong face sheet with pt. This is the wrong pt name

## 2022-03-08 NOTE — ED NOTES
[facility] ED Handoff Report    ED Chief Complaint:     ED Diagnosis:  No diagnosis found.     PMH:  No past medical history on file.     Code Status:  No Order     Falls Risk: Yes Band: Applied    Current Living Situation/Residence: lives in a group home     Elimination Status: Continent: Yes     Activity Level: SBA    Patients Preferred Language:  English     Needed: No    Vital Signs:  /56   Pulse 88   Resp 14   SpO2 97%      Cardiac Rhythm: NSR    Pain Score: 0/10    Is the Patient Confused:  Yes    Last Food or Drink: 03/08/22     Focused Assessment:  Patient arrived to ED due to fall and laceration on top of head. Patient is A/O x 1. Cooperative, likes pepsi, sandwiches, and sadie bears. patient has been sitting in recliner most of the time.     Tests Performed: Done: Labs and Imaging    Treatments Provided:  Staples for laceration    Family Dynamics/Concerns: No    Family Updated On Visitor Policy: Yes    Plan of Care Communicated to Family: Yes    Who Was Updated about Plan of Care: group home staff    Belongings Checklist Done and Signed by Patient: Yes    Medications sent with patient: None    Covid: asymptomatic , negative    Additional Information: Group home number to call for additional updates is [phone]    Marta Cr RN  3/8/2022 2:12 PM

## 2022-03-09 ENCOUNTER — APPOINTMENT (OUTPATIENT)
Dept: ULTRASOUND IMAGING | Facility: HOSPITAL | Age: 77
DRG: 163 | End: 2022-03-09
Attending: INTERNAL MEDICINE
Payer: MEDICARE

## 2022-03-09 ENCOUNTER — APPOINTMENT (OUTPATIENT)
Dept: PHYSICAL THERAPY | Facility: HOSPITAL | Age: 77
DRG: 163 | End: 2022-03-09
Attending: HOSPITALIST
Payer: MEDICARE

## 2022-03-09 ENCOUNTER — APPOINTMENT (OUTPATIENT)
Dept: OCCUPATIONAL THERAPY | Facility: HOSPITAL | Age: 77
DRG: 163 | End: 2022-03-09
Attending: HOSPITALIST
Payer: MEDICARE

## 2022-03-09 ENCOUNTER — APPOINTMENT (OUTPATIENT)
Dept: RADIOLOGY | Facility: HOSPITAL | Age: 77
DRG: 163 | End: 2022-03-09
Attending: INTERNAL MEDICINE
Payer: MEDICARE

## 2022-03-09 LAB
ANION GAP SERPL CALCULATED.3IONS-SCNC: 8 MMOL/L (ref 5–18)
BASOPHILS # BLD AUTO: 0 10E3/UL (ref 0–0.2)
BASOPHILS NFR BLD AUTO: 0 %
BUN SERPL-MCNC: 9 MG/DL (ref 8–28)
CALCIUM SERPL-MCNC: 7.9 MG/DL (ref 8.5–10.5)
CHLORIDE BLD-SCNC: 108 MMOL/L (ref 98–107)
CO2 SERPL-SCNC: 25 MMOL/L (ref 22–31)
CREAT SERPL-MCNC: 0.64 MG/DL (ref 0.7–1.3)
EOSINOPHIL # BLD AUTO: 0.1 10E3/UL (ref 0–0.7)
EOSINOPHIL NFR BLD AUTO: 1 %
ERYTHROCYTE [DISTWIDTH] IN BLOOD BY AUTOMATED COUNT: 17.8 % (ref 10–15)
GFR SERPL CREATININE-BSD FRML MDRD: >90 ML/MIN/1.73M2
GLUCOSE BLD-MCNC: 109 MG/DL (ref 70–125)
HCT VFR BLD AUTO: 27.5 % (ref 40–53)
HGB BLD-MCNC: 7.8 G/DL (ref 13.3–17.7)
IMM GRANULOCYTES # BLD: 0.1 10E3/UL
IMM GRANULOCYTES NFR BLD: 1 %
LDH SERPL L TO P-CCNC: 314 U/L (ref 125–220)
LYMPHOCYTES # BLD AUTO: 1 10E3/UL (ref 0.8–5.3)
LYMPHOCYTES NFR BLD AUTO: 10 %
MCH RBC QN AUTO: 23.6 PG (ref 26.5–33)
MCHC RBC AUTO-ENTMCNC: 28.4 G/DL (ref 31.5–36.5)
MCV RBC AUTO: 83 FL (ref 78–100)
MONOCYTES # BLD AUTO: 0.9 10E3/UL (ref 0–1.3)
MONOCYTES NFR BLD AUTO: 8 %
NEUTROPHILS # BLD AUTO: 8.2 10E3/UL (ref 1.6–8.3)
NEUTROPHILS NFR BLD AUTO: 80 %
NRBC # BLD AUTO: 0 10E3/UL
NRBC BLD AUTO-RTO: 0 /100
PLATELET # BLD AUTO: 437 10E3/UL (ref 150–450)
POTASSIUM BLD-SCNC: 3.5 MMOL/L (ref 3.5–5)
PROT SERPL-MCNC: 7.3 G/DL (ref 6–8)
RBC # BLD AUTO: 3.31 10E6/UL (ref 4.4–5.9)
SODIUM SERPL-SCNC: 141 MMOL/L (ref 136–145)
WBC # BLD AUTO: 10.2 10E3/UL (ref 4–11)

## 2022-03-09 PROCEDURE — 97162 PT EVAL MOD COMPLEX 30 MIN: CPT | Mod: GP

## 2022-03-09 PROCEDURE — 120N000001 HC R&B MED SURG/OB

## 2022-03-09 PROCEDURE — 258N000003 HC RX IP 258 OP 636: Performed by: STUDENT IN AN ORGANIZED HEALTH CARE EDUCATION/TRAINING PROGRAM

## 2022-03-09 PROCEDURE — 250N000011 HC RX IP 250 OP 636: Performed by: HOSPITALIST

## 2022-03-09 PROCEDURE — 85025 COMPLETE CBC W/AUTO DIFF WBC: CPT | Performed by: HOSPITALIST

## 2022-03-09 PROCEDURE — 87205 SMEAR GRAM STAIN: CPT | Performed by: INTERNAL MEDICINE

## 2022-03-09 PROCEDURE — 97116 GAIT TRAINING THERAPY: CPT | Mod: GP

## 2022-03-09 PROCEDURE — 84155 ASSAY OF PROTEIN SERUM: CPT | Performed by: INTERNAL MEDICINE

## 2022-03-09 PROCEDURE — 99233 SBSQ HOSP IP/OBS HIGH 50: CPT | Performed by: INTERNAL MEDICINE

## 2022-03-09 PROCEDURE — 97166 OT EVAL MOD COMPLEX 45 MIN: CPT | Mod: GO

## 2022-03-09 PROCEDURE — 36415 COLL VENOUS BLD VENIPUNCTURE: CPT | Performed by: INTERNAL MEDICINE

## 2022-03-09 PROCEDURE — 250N000013 HC RX MED GY IP 250 OP 250 PS 637: Performed by: HOSPITALIST

## 2022-03-09 PROCEDURE — 87070 CULTURE OTHR SPECIMN AEROBIC: CPT | Performed by: INTERNAL MEDICINE

## 2022-03-09 PROCEDURE — 272N000706 US THORACENTESIS

## 2022-03-09 PROCEDURE — 82310 ASSAY OF CALCIUM: CPT | Performed by: HOSPITALIST

## 2022-03-09 PROCEDURE — 71045 X-RAY EXAM CHEST 1 VIEW: CPT

## 2022-03-09 PROCEDURE — 97535 SELF CARE MNGMENT TRAINING: CPT | Mod: GO

## 2022-03-09 PROCEDURE — 89050 BODY FLUID CELL COUNT: CPT | Performed by: INTERNAL MEDICINE

## 2022-03-09 PROCEDURE — 36415 COLL VENOUS BLD VENIPUNCTURE: CPT | Performed by: HOSPITALIST

## 2022-03-09 PROCEDURE — 83615 LACTATE (LD) (LDH) ENZYME: CPT | Performed by: INTERNAL MEDICINE

## 2022-03-09 RX ADMIN — SERTRALINE HYDROCHLORIDE 25 MG: 25 TABLET ORAL at 09:41

## 2022-03-09 RX ADMIN — DOCUSATE SODIUM 50 MG AND SENNOSIDES 8.6 MG 2 TABLET: 8.6; 5 TABLET, FILM COATED ORAL at 22:04

## 2022-03-09 RX ADMIN — ACETAMINOPHEN 975 MG: 325 TABLET ORAL at 09:41

## 2022-03-09 RX ADMIN — SODIUM CHLORIDE 500 ML: 9 INJECTION, SOLUTION INTRAVENOUS at 03:56

## 2022-03-09 RX ADMIN — ONDANSETRON 4 MG: 4 TABLET, ORALLY DISINTEGRATING ORAL at 16:29

## 2022-03-09 RX ADMIN — LORAZEPAM 2 MG: 1 TABLET ORAL at 16:29

## 2022-03-09 RX ADMIN — DOCUSATE SODIUM 50 MG AND SENNOSIDES 8.6 MG 1 TABLET: 8.6; 5 TABLET, FILM COATED ORAL at 09:42

## 2022-03-09 RX ADMIN — ACETAMINOPHEN 975 MG: 325 TABLET ORAL at 23:35

## 2022-03-09 RX ADMIN — ACETAMINOPHEN 975 MG: 325 TABLET ORAL at 16:29

## 2022-03-09 ASSESSMENT — ACTIVITIES OF DAILY LIVING (ADL)
ADLS_ACUITY_SCORE: 12

## 2022-03-09 NOTE — PLAN OF CARE
Goal Outcome Evaluation:      Pt is A&O to self, admitted from group home after af fall with scalp lac. Pt denied pain. Has not used his call light, frequent checks by staff. Pt denied SOB, has occasional dry cough, LS clear/diminished, RR reg, sats upper 90's on RA. Pt has good appetite, ate 100% of roast beef, mashed potatoes and salad. Taking sips. Pt is continent, voided x1 this shift. Pt also requested to ambulate on unit, escorted by staff using transfer belt and walker, pt is stable on his feet. HS cares provided and pt tucked in around 9pm  Problem: Plan of Care - These are the overarching goals to be used throughout the patient stay.    Goal: Optimal Comfort and Wellbeing  Outcome: Ongoing, Progressing     Problem: Risk for Delirium  Goal: Improved Behavioral Control  Outcome: Ongoing, Progressing  Goal: Improved Attention and Thought Clarity  Outcome: Ongoing, Progressing     Problem: Fall Injury Risk  Goal: Absence of Fall and Fall-Related Injury  Outcome: Ongoing, Progressing  Intervention: Promote Injury-Free Environment  Recent Flowsheet Documentation  Taken 3/8/2022 1633 by Carly Barr RN  Safety Promotion/Fall Prevention:   activity supervised   assistive device/personal items within reach   bed alarm on   chair alarm on   increased rounding and observation   nonskid shoes/slippers when out of bed   patient and family education   room door open   supervised activity   toileting scheduled     Problem: Pain Acute  Goal: Acceptable Pain Control and Functional Ability  Outcome: Ongoing, Progressing   Denied pain  Problem: Plan of Care - These are the overarching goals to be used throughout the patient stay.    Goal: Absence of Hospital-Acquired Illness or Injury  Intervention: Identify and Manage Fall Risk  Recent Flowsheet Documentation  Taken 3/8/2022 1633 by Carly Barr RN  Safety Promotion/Fall Prevention:   activity supervised   assistive device/personal items within reach   bed alarm on    chair alarm on   increased rounding and observation   nonskid shoes/slippers when out of bed   patient and family education   room door open   supervised activity   toileting scheduled  Intervention: Prevent Skin Injury  Recent Flowsheet Documentation  Taken 3/8/2022 2024 by Carly Barr, RN  Body Position: supine  Intervention: Prevent and Manage VTE (Venous Thromboembolism) Risk  Recent Flowsheet Documentation  Taken 3/8/2022 1800 by Carly Barr, RN  Activity Management:   ambulated in haro   up in chair  Taken 3/8/2022 1633 by Carly Barr, RN  Activity Management: up in chair   Bed/ chair alarm in place

## 2022-03-09 NOTE — UTILIZATION REVIEW
Inpatient appropriate    Admission Status; Secondary Review Determination       Under the authority of the Utilization Management Committee, the utilization review process indicated a secondary review on the above patient. The review outcome is based on review of the medical records, discussions with staff, and applying clinical experience noted on the date of the review.     (x) Inpatient Status Appropriate - This patient's medical care is consistent with medical management for inpatient care and reasonable inpatient medical practice.     RATIONALE FOR DETERMINATION     76 year old male admitted on 3/8/2022. He has history of hypertension, mood disorder presented from group home status post mechanical fall and laceration on his head.  On admission CT showed pleural fluid on the left lung [place].  Chest x-ray show moderate to marked left-sided pleural effusion.  Patient need thoracentesis later today.  Will remain hospitalized greater than 2 midnights     At the time of admission with the information available to the attending physician more than 2 nights Hospital complex care was anticipated, based on patient risk of adverse outcome if treated as outpatient and complex care required. Inpatient admission is appropriate based on the Medicare guidelines.     The information on this document is developed by the utilization review team in order for the business office to ensure compliance. This only denotes the appropriateness of proper admission status and does not reflect the quality of care rendered.   The definitions of Inpatient Status and Observation Status used in making the determination above are those provided in the CMS Coverage Manual, Chapter 1 and Chapter 6, section 70.4.   Sincerely,   Blaine King MD  Utilization Review  Physician Advisor  [facility].

## 2022-03-09 NOTE — PROGRESS NOTES
03/09/22 0800   Quick Adds   Type of Visit Initial Occupational Therapy Evaluation   Living Environment   People in Home facility resident   Current Living Arrangements group home   Living Environment Comments Pt lives in a group home, walk in shower, no stairs   Self-Care   Usual Activity Tolerance moderate   Current Activity Tolerance fair   Fall history within last six months yes   Number of times patient has fallen within last six months 1   Activity/Exercise/Self-Care Comment Per group home rep, pt was IND with ADLs up until 4 months ago. Group home staff is able to provide extra assist as needed.    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 He has history of hypertension, mood disorder presented from group home status post mechanical fall and laceration on his head.   Existing Precautions/Restrictions fall   Limitations/Impairments hearing;safety/cognitive   Cognitive Status Examination   Orientation Status place;person   Affect/Mental Status (Cognitive) confused;flat/blunted affect   Follows Commands follows one-step commands;50-74% accuracy   Executive Function Deficit moderate deficit;organization/sequencing;planning/decision-making;problem-solving/reasoning;self-monitoring/self-correction   Visual Perception   Visual Impairment/Limitations near/reading vision impaired   Sensory   Sensory Quick Adds No deficits were identified   Pain Assessment   Patient Currently in Pain Yes, see Vital Sign flowsheet   Integumentary/Edema   Integumentary/Edema no deficits were identifed   Posture   Posture forward head  position   Range of Motion Comprehensive   Comment, General Range of Motion Pt is generally deconditioned   Strength Comprehensive (MMT)   Comment, General Manual Muscle Testing (MMT) Assessment Pt is generally deconditioned   Coordination   Functional Limitations Fine motor ADL performance impaired   Transfers   Transfers sit-stand transfer   Sit-Stand Transfer   Sit-Stand [place] (Transfers) supervision;verbal cues;nonverbal cues (demo/gesture)   Assistive Device (Sit-Stand Transfers) walker, front-wheeled   Balance   Balance Assessment standing balance: dynamic;identified impairments contributing to balance disturbance   Balance Comments Pt demonstrates shuffling gait, imrpoved with FWW   Activities of Daily Living   BADL Assessment/Intervention lower body dressing;grooming   Lower Body Dressing Assessment/Training   [place] Level (Lower Body Dressing) minimum assist (75% patient effort);verbal cues;nonverbal cues (demo/gesture)   Position (Lower Body Dressing) supported sitting   Grooming Assessment/Training   [place] Level (Grooming) supervision;set up;verbal cues;nonverbal cues (demo/gesture)   Position (Grooming) supported standing   Clinical Impression   Criteria for Skilled Therapeutic Interventions Met (OT) Yes, treatment indicated   OT Diagnosis ADL dysfunciton   OT Problem List-Impairments impacting ADL activity tolerance impaired;balance;cognition;communication;strength   Assessment of Occupational Performance 3-5 Performance Deficits   Identified Performance Deficits Dressing, toileting, bathing   Planned Therapy Interventions (OT) ADL retraining;balance training;strengthening;transfer training   Clinical Decision Making Complexity (OT) moderate complexity   Anticipated Equipment Needs Upon Discharge (OT) bathing equipment;walker, rolling;raised toilet seat;shower chair   Risk & Benefits of therapy have been explained evaluation/treatment results reviewed;care plan/treatment goals  reviewed;risks/benefits reviewed;current/potential barriers reviewed;participants voiced agreement with care plan;participants included;patient   OT Discharge Planning   OT Discharge Recommendation (DC Rec) home with assist;home with home care occupational therapy   OT Rationale for DC Rec Pt is below baseline but has support at group home. Salvatoreketan anticipates pt can safely dc home with assist/supervision and AE once medically ready   Total Evaluation Time (Minutes)   Total Evaluation Time (Minutes) 10   OT Goals   Therapy Frequency (OT) 5 times/wk   OT Predicated Duration/Target Date for Goal Attainment 03/16/22   OT Goals Hygiene/Grooming;Upper Body Dressing;Lower Body Dressing;Toilet Transfer/Toileting;Transfers   OT: Hygiene/Grooming supervision/stand-by assist;while standing   OT: Upper Body Dressing Supervision/stand-by assist;including set-up/clothing retrieval   OT: Lower Body Dressing Supervision/stand-by assist;using adaptive equipment;including set-up/clothing retrieval   OT: Transfer Supervision/stand-by assist;with assistive device   OT: Toilet Transfer/Toileting Supervision/stand-by assist;toilet transfer;cleaning and garment management;using adaptive equipment

## 2022-03-09 NOTE — PROGRESS NOTES
"Care Management Follow Up    Length of Stay (days): 1    Expected Discharge Date: 03/10/2022     Concerns to be Addressed:  Diagnostic work up      Patient plan of care discussed at interdisciplinary rounds: Yes    Anticipated Discharge Disposition:  Group home     Anticipated Discharge Services:  Home care  Anticipated Discharge DME:  walker    Patient/family educated on Medicare website which has current facility and service quality ratings:    Education Provided on the Discharge Plan:    Patient/Family in Agreement with the Plan:      Referrals Placed by CM/SW:  Home care PT/OT       Additional Information:  Patient admitted for fall, head injury, left sided fluid effusion.    Recommendations is to return to group [facility] with assist and home therapy.    Social History:  Patient lives at [facility]: [facility], independent w/showers and ambulatory; needs helps w/all other ADLs. Pt still works 4 days a week. Discharge planning w/house at [phone] to discuss transport, may need WC ride.    Pt has been declining, per Alan, caregiver at the house; normally does his own laundry and works, but they are having to help him more and more w/laundry and meals and medications. Pt is walking less and losing weight. To help pt calm now and distract him, he is offered pepsi, sandwich and a teddybear. Pt also likes looking at fish in a fish tank. Alan also indicates that pt like talking about his job and he calls Alan \"Mari\", and Alan is usually able to distract him by asking him if he wants to go to the hospital. Pt doesn't like being in the hospital.    Spoke with group [facility]. She confirms above and states patient does not have a walker at home. PT is recommending walker at discharge. She states concerns of patient decline in over all health. She reports patient's brother-Gaurav is main family contact but she is not sure if he is legal guardian.  TRE left message with Gaurav requesting call back and " clarification. Home care referrals faxed.     3:55 PM Patient's brother Gaurav called back. He was unaware of hospitalization. He states he and his spouse are legal guardians. He has paperwork which he states he will fax to the care management department.     Aleja Tang RN

## 2022-03-09 NOTE — PLAN OF CARE
Problem: Plan of Care - These are the overarching goals to be used throughout the patient stay.    Goal: Optimal Comfort and Wellbeing  Outcome: Ongoing, Progressing     Problem: Risk for Delirium  Goal: Improved Attention and Thought Clarity  Outcome: Ongoing, Progressing     Problem: Fall Injury Risk  Goal: Absence of Fall and Fall-Related Injury  Outcome: Ongoing, Progressing  Intervention: Identify and Manage Contributors  Recent Flowsheet Documentation  Taken 3/9/2022 0930 by Loretta Orona, RN  Medication Review/Management: medications reviewed  Intervention: Promote Injury-Free Environment  Recent Flowsheet Documentation  Taken 3/9/2022 0930 by Loretta Orona, RN  Safety Promotion/Fall Prevention: activity supervised     Problem: Pain Acute  Goal: Acceptable Pain Control and Functional Ability  Outcome: Ongoing, Progressing  Intervention: Prevent or Manage Pain  Recent Flowsheet Documentation  Taken 3/9/2022 0930 by Loretta Orona, RN  Medication Review/Management: medications reviewed   Goal Outcome Evaluation:        Patient denies any pain or discomfort. Noted staples to top of head lightly covered with gauze and tape. Orientated to person. Plan is to have thoracentesis this afternoon @1530.

## 2022-03-09 NOTE — PROGRESS NOTES
[facility]    Medicine Progress Note - Hospitalist Service    Date of Admission:  3/8/2022    Assessment & Plan           Assessment & Plan     Perry Betancourt is a 76 year old male admitted on 3/8/2022. He has history of hypertension and mood disorder presented from group home due to mechanical fall and laceration on his head. CT C-spine showed pleural fluid filling the left lung apex. Chest x-ray reportedly showed moderate to marked left sided pleural fluid  collection.     Discussed with Erin and patient's Guardian, Gaurav ([phone]); both agreed with plan to proceed with left thoracentesis.        Status post mechanical fall  Head injury secondary to fall  -Patient presented with mechanical fall from his group home.   CT scan of the head showed questionable subdural hygroma measuring up to 0.9 cm  versus prominence of subarachnoid space overlying the left parietal lobe.  CT C-spine  showed no acute fractures.   -Laceration on his head has been repaired in the ED.  -Fall precautions        Left-sided fluid effusion  -Breathing comfortably on room air   -This was initially seen on the CT C-spine which showed pleural fluid filling the left lung apex  and was not incompletely imaged.  -Chest x-ray was obtained which showed moderate to marked left sided pleural fluid  collection .  -Patient declines any shortness of breath, saturations stable on room air and is completely  asymptomatic.  -Repeat CXR showed moderate left-sided pleural effusion with atelectasis  -Discussed with Erin and patient's Guardian, Gaurav ([phone]) who both agreed with plan to proceed with left thoracentesis.     Hypertension-stable  -Continue PTA amlodipine, atenolol     Mood disorder  -Continue PTA sertraline, ativan     Diet: Regular  DVT Prophylaxis: Pneumatic Compression Devices  Terry Catheter: Not present  Central Lines: None  Code Status:            Disposition Plan     Expected Discharge: 1-2 days.    Anticipated discharge location:  Awaiting care coordination huddle             Diet: Regular Diet Adult    DVT Prophylaxis: Pneumatic Compression Devices  Terry Catheter: Not present  Central Lines: None  Cardiac Monitoring: None  Code Status: Full Code      Disposition Plan   Expected Discharge: 03/10/2022     Anticipated discharge location:  Awaiting care coordination huddle  Delays:     placement, thoracentesis for pleural effusion with pleural fluid analysis       The patient's care was discussed with the Bedside Nurse and Patient. Erin and patient's Guardian, Gaurav Kim MD  Hospitalist Service  [facility]  Securely message with the Vocera Web Console (learn more here)  Text page via [employer] Paging/Directory         Clinically Significant Risk Factors Present on Admission                     ______________________________________________________________________    Interval History   No new complaints. He denies chest pain or sob. Seems confused but able to engage in conversation     Data reviewed today: I reviewed all medications, new labs and imaging results over the last 24 hours. I personally reviewed no images or EKG's today.    Physical Exam   Vital Signs: Temp: 97.5  F (36.4  C) Temp src: Oral BP: (!) 140/66 Pulse: 81   Resp: 16 SpO2: 96 % O2 Device: None (Room air)    Weight: 0 lbs 0 oz  Constitutional: awake, alert, cooperative, no apparent distress, frail, and appears stated age, hard of hearing  Head: wound dressing in the right parietal region - clean and dry    Respiratory: Diminished air entry bilaterally   Cardiovascular: Normal apical impulse, regular rate and rhythm, normal S1 and S2, no S3 or S4, and no murmur noted  GI: No scars, normal bowel sounds, soft, non-distended, non-tender, no masses palpated, no hepatosplenomegally  Musculoskeletal: There is no redness, warmth, or swelling of the joints.  Full range of motion noted.  Motor strength is 5 out  of 5 all extremities bilaterally.  Tone is normal.  Neurologic: Awake, alert, oriented to name, place and time.  Motor is 5 out of 5 bilaterally.     Data   Recent Labs   Lab 03/09/22  0921 03/09/22  0541   WBC  --  10.2   HGB  --  7.8*   MCV  --  83   PLT  --  437   NA  --  141   POTASSIUM  --  3.5   CHLORIDE  --  108*   CO2  --  25   BUN  --  9   CR  --  0.64*   ANIONGAP  --  8   CRYSTAL  --  7.9*   GLC  --  109   PROTTOTAL 7.3  --

## 2022-03-09 NOTE — PLAN OF CARE
Physical Therapy Discharge Summary    Reason for therapy discharge:    All goals and outcomes met, no further needs identified.    Progress towards therapy goal(s). See goals on Care Plan in [facility] electronic health record for goal details.  Goals met    Therapy recommendation(s):    Continued therapy is recommended.  Rationale/Recommendations:  home PT recommended to progress further toward PLOF.     Pt is ambulating safely with FWW and able to negotiate stairs. Recommend return to  with home PT when ready to discharge from hospital.     Karen Billy, DPT 3/9/2022,  [id number]

## 2022-03-09 NOTE — PROGRESS NOTES
Physical Therapy        03/09/22 0800   Quick Adds   Type of Visit Initial PT Evaluation   Living Environment   People in Home facility resident   Current Living Arrangements group home   Living Environment Comments Pt lives in a group home, walk in shower, no stairs   Self-Care   Usual Activity Tolerance moderate   Current Activity Tolerance fair   Fall history within last six months yes   Number of times patient has fallen within last six months 1   Activity/Exercise/Self-Care Comment Per group home rep, pt was IND with ADLs up until 4 months ago. Group home staff is able to provide extra assist as needed.    General Information   Onset of Illness/Injury or Date of Surgery 03/08/22   Referring Physician Ofelia Higuera MD   Patient/Family Therapy Goals Statement (PT) go home today   Pertinent History of Current Problem (include personal factors and/or comorbidities that impact the POC) fall, head lac   Existing Precautions/Restrictions fall   Cognition   Cognitive Status Comments pleasant and cooperative, follows instructions. cognitive impairment at baseline appears unchanged    Pain Assessment   Patient Currently in Pain No   Integumentary/Edema   Integumentary/Edema no deficits were identifed   Posture    Posture Not impaired   Range of Motion (ROM)   Range of Motion ROM is WNL   Strength Comprehensive (MMT)   Comment, General Manual Muscle Testing (MMT) Assessment Pt is generally deconditioned   Strength (Manual Muscle Testing)   Strength (Manual Muscle Testing) Deficits observed during functional mobility   Transfers   Comment, (Transfers) sit<>stand SBA with and without FWW   Gait/Stairs (Locomotion)   [place] Level (Gait) supervision   Distance in Feet (Required for LE Total Joints) 200' without AD, 200' with FWW   Comment, (Gait/Stairs) 4 steps with B rails, SBA   Balance   Balance Comments unsteady without overt LOB, improves with addition of FWW    Sensory Examination   Sensory Perception patient  reports no sensory changes   Clinical Impression   Criteria for Skilled Therapeutic Intervention Yes, treatment indicated   PT Diagnosis (PT) unsteady gait   Influenced by the following impairments deconditioning   Functional limitations due to impairments fall risk   Clinical Presentation (PT Evaluation Complexity) Stable/Uncomplicated   Clinical Presentation Rationale presents as medically diagnosed   Clinical Decision Making (Complexity) moderate complexity   Planned Therapy Interventions (PT) balance training;bed mobility training;gait training;home exercise program;neuromuscular re-education;patient/family education;ROM (range of motion);transfer training   Anticipated Equipment Needs at Discharge (PT) walker, rolling  (unclear whether he owns FWW)   Risk & Benefits of therapy have been explained evaluation/treatment results reviewed;patient   PT Discharge Planning   PT Discharge Recommendation (DC Rec) home with home care physical therapy;home with assist   PT Rationale for DC Rec will need FWW, unclear if he has one at    Total Evaluation Time   Total Evaluation Time (Minutes) 10   Physical Therapy Goals   PT Frequency One time eval and treatment only   PT Predicated Duration/Target Date for Goal Attainment 03/09/22   PT Goals Transfers;Gait;Stairs   PT: Transfers Sit to/from stand;Supervision/stand-by assist;Goal Met   PT: Gait Supervision/stand-by assist;150 feet;Rolling walker;Goal Met   PT: Stairs Supervision/stand-by assist;4 stairs;Rail on both sides;Goal Met       Karen Billy DPT 3/9/2022  9:27 AM    Addendum: RNCM contacted family and reports that pt does not own a walker and has not previously used one at . Will revise goals and resume PT.   Karen Billy, PT 4:24 PM

## 2022-03-09 NOTE — PLAN OF CARE
Problem: Plan of Care - These are the overarching goals to be used throughout the patient stay.    Goal: Absence of Hospital-Acquired Illness or Injury  Intervention: Identify and Manage Fall Risk  Recent Flowsheet Documentation  Taken 3/9/2022 0000 by Maliha Copeland RN  Safety Promotion/Fall Prevention:   activity supervised   bed alarm on   room door open   patient and family education   nonskid shoes/slippers when out of bed   increase visualization of patient   increased rounding and observation     Problem: Risk for Delirium  Goal: Improved Sleep  Outcome: Ongoing, Progressing     Problem: Fall Injury Risk  Goal: Absence of Fall and Fall-Related Injury  Intervention: Identify and Manage Contributors  Recent Flowsheet Documentation  Taken 3/9/2022 0000 by Maliha Copeland RN  Medication Review/Management: medications reviewed     Patient alert to self only. Denies pain . At 0330, his blood pressure was 80/42. Notified the house officer, Dr. Lazo. Ordered a 500 ml bolus of NS. Post bolus his blood pressure was 98/53. Up with assist of one to the bathroom. Bed alarm on and functioning. Laceration on head is covered, CDI.     Maliha Copeland, MARTINEZ

## 2022-03-10 ENCOUNTER — APPOINTMENT (OUTPATIENT)
Dept: CT IMAGING | Facility: HOSPITAL | Age: 77
DRG: 163 | End: 2022-03-10
Payer: MEDICARE

## 2022-03-10 ENCOUNTER — APPOINTMENT (OUTPATIENT)
Dept: PHYSICAL THERAPY | Facility: HOSPITAL | Age: 77
DRG: 163 | End: 2022-03-10
Payer: MEDICARE

## 2022-03-10 PROBLEM — J86.9 EMPYEMA OF LEFT PLEURAL SPACE (H): Status: ACTIVE | Noted: 2022-03-10

## 2022-03-10 PROBLEM — K08.9 POOR DENTITION: Status: ACTIVE | Noted: 2022-03-10

## 2022-03-10 LAB
ANION GAP SERPL CALCULATED.3IONS-SCNC: 11 MMOL/L (ref 5–18)
BASOPHILS # BLD AUTO: 0.1 10E3/UL (ref 0–0.2)
BASOPHILS NFR BLD AUTO: 0 %
BUN SERPL-MCNC: 10 MG/DL (ref 8–28)
CALCIUM SERPL-MCNC: 8 MG/DL (ref 8.5–10.5)
CHLORIDE BLD-SCNC: 104 MMOL/L (ref 98–107)
CO2 SERPL-SCNC: 24 MMOL/L (ref 22–31)
CREAT SERPL-MCNC: 0.68 MG/DL (ref 0.7–1.3)
EOSINOPHIL # BLD AUTO: 0.1 10E3/UL (ref 0–0.7)
EOSINOPHIL NFR BLD AUTO: 0 %
ERYTHROCYTE [DISTWIDTH] IN BLOOD BY AUTOMATED COUNT: 17.9 % (ref 10–15)
GFR SERPL CREATININE-BSD FRML MDRD: >90 ML/MIN/1.73M2
GLUCOSE BLD-MCNC: 138 MG/DL (ref 70–125)
HCT VFR BLD AUTO: 33.7 % (ref 40–53)
HGB BLD-MCNC: 9.7 G/DL (ref 13.3–17.7)
IMM GRANULOCYTES # BLD: 0.1 10E3/UL
IMM GRANULOCYTES NFR BLD: 1 %
LYMPHOCYTES # BLD AUTO: 1.2 10E3/UL (ref 0.8–5.3)
LYMPHOCYTES NFR BLD AUTO: 8 %
MCH RBC QN AUTO: 23.6 PG (ref 26.5–33)
MCHC RBC AUTO-ENTMCNC: 28.8 G/DL (ref 31.5–36.5)
MCV RBC AUTO: 82 FL (ref 78–100)
MONOCYTES # BLD AUTO: 0.6 10E3/UL (ref 0–1.3)
MONOCYTES NFR BLD AUTO: 4 %
NEUTROPHILS # BLD AUTO: 13.9 10E3/UL (ref 1.6–8.3)
NEUTROPHILS NFR BLD AUTO: 87 %
NRBC # BLD AUTO: 0 10E3/UL
NRBC BLD AUTO-RTO: 0 /100
PLATELET # BLD AUTO: 600 10E3/UL (ref 150–450)
POTASSIUM BLD-SCNC: 2.9 MMOL/L (ref 3.5–5)
RBC # BLD AUTO: 4.11 10E6/UL (ref 4.4–5.9)
SODIUM SERPL-SCNC: 139 MMOL/L (ref 136–145)
WBC # BLD AUTO: 15.8 10E3/UL (ref 4–11)

## 2022-03-10 PROCEDURE — 86923 COMPATIBILITY TEST ELECTRIC: CPT | Performed by: STUDENT IN AN ORGANIZED HEALTH CARE EDUCATION/TRAINING PROGRAM

## 2022-03-10 PROCEDURE — 97116 GAIT TRAINING THERAPY: CPT | Mod: GP

## 2022-03-10 PROCEDURE — 120N000001 HC R&B MED SURG/OB

## 2022-03-10 PROCEDURE — 36415 COLL VENOUS BLD VENIPUNCTURE: CPT | Performed by: INTERNAL MEDICINE

## 2022-03-10 PROCEDURE — 250N000013 HC RX MED GY IP 250 OP 250 PS 637: Performed by: HOSPITALIST

## 2022-03-10 PROCEDURE — 85025 COMPLETE CBC W/AUTO DIFF WBC: CPT | Performed by: INTERNAL MEDICINE

## 2022-03-10 PROCEDURE — 999N000127 HC STATISTIC PERIPHERAL IV START W US GUIDANCE

## 2022-03-10 PROCEDURE — 99233 SBSQ HOSP IP/OBS HIGH 50: CPT | Performed by: INTERNAL MEDICINE

## 2022-03-10 PROCEDURE — 80048 BASIC METABOLIC PNL TOTAL CA: CPT | Performed by: INTERNAL MEDICINE

## 2022-03-10 PROCEDURE — 258N000003 HC RX IP 258 OP 636: Performed by: INTERNAL MEDICINE

## 2022-03-10 PROCEDURE — 86901 BLOOD TYPING SEROLOGIC RH(D): CPT | Performed by: STUDENT IN AN ORGANIZED HEALTH CARE EDUCATION/TRAINING PROGRAM

## 2022-03-10 PROCEDURE — 86850 RBC ANTIBODY SCREEN: CPT | Performed by: STUDENT IN AN ORGANIZED HEALTH CARE EDUCATION/TRAINING PROGRAM

## 2022-03-10 PROCEDURE — 71250 CT THORAX DX C-: CPT

## 2022-03-10 PROCEDURE — 250N000011 HC RX IP 250 OP 636

## 2022-03-10 PROCEDURE — 99223 1ST HOSP IP/OBS HIGH 75: CPT

## 2022-03-10 PROCEDURE — 250N000011 HC RX IP 250 OP 636: Performed by: INTERNAL MEDICINE

## 2022-03-10 RX ORDER — AMPICILLIN AND SULBACTAM 2; 1 G/1; G/1
3 INJECTION, POWDER, FOR SOLUTION INTRAMUSCULAR; INTRAVENOUS EVERY 6 HOURS
Status: DISCONTINUED | OUTPATIENT
Start: 2022-03-10 | End: 2022-03-21 | Stop reason: HOSPADM

## 2022-03-10 RX ADMIN — ACETAMINOPHEN 975 MG: 325 TABLET ORAL at 16:42

## 2022-03-10 RX ADMIN — VANCOMYCIN HYDROCHLORIDE 1000 MG: 1 INJECTION, POWDER, LYOPHILIZED, FOR SOLUTION INTRAVENOUS at 14:27

## 2022-03-10 RX ADMIN — ACETAMINOPHEN 975 MG: 325 TABLET ORAL at 08:16

## 2022-03-10 RX ADMIN — AMPICILLIN SODIUM AND SULBACTAM SODIUM 3 G: 2; 1 INJECTION, POWDER, FOR SOLUTION INTRAMUSCULAR; INTRAVENOUS at 18:56

## 2022-03-10 RX ADMIN — AMPICILLIN SODIUM AND SULBACTAM SODIUM 3 G: 2; 1 INJECTION, POWDER, FOR SOLUTION INTRAMUSCULAR; INTRAVENOUS at 12:58

## 2022-03-10 RX ADMIN — LORAZEPAM 2 MG: 1 TABLET ORAL at 16:42

## 2022-03-10 RX ADMIN — DOCUSATE SODIUM 50 MG AND SENNOSIDES 8.6 MG 1 TABLET: 8.6; 5 TABLET, FILM COATED ORAL at 08:17

## 2022-03-10 RX ADMIN — DOCUSATE SODIUM 50 MG AND SENNOSIDES 8.6 MG 2 TABLET: 8.6; 5 TABLET, FILM COATED ORAL at 21:02

## 2022-03-10 RX ADMIN — Medication 1 MG: at 21:03

## 2022-03-10 RX ADMIN — SERTRALINE HYDROCHLORIDE 25 MG: 25 TABLET ORAL at 08:17

## 2022-03-10 ASSESSMENT — ACTIVITIES OF DAILY LIVING (ADL)
ADLS_ACUITY_SCORE: 14
ADLS_ACUITY_SCORE: 12
ADLS_ACUITY_SCORE: 14
ADLS_ACUITY_SCORE: 14
ADLS_ACUITY_SCORE: 12
ADLS_ACUITY_SCORE: 12
ADLS_ACUITY_SCORE: 14
ADLS_ACUITY_SCORE: 12
ADLS_ACUITY_SCORE: 14
ADLS_ACUITY_SCORE: 14
ADLS_ACUITY_SCORE: 12
ADLS_ACUITY_SCORE: 14

## 2022-03-10 NOTE — PLAN OF CARE
Patient is developmentally disabled. He went down to Thoracentesis at start of shift and was brought back because he had to have a bowel movement. Pt used the toilet and went back down for procedure. Lab called later with results and stated that fluid was too thick and full of mucous for test. They were only able to get the microbiology cultures done.  Notified hospitalist on call. Patient asleep at end of shift.  Problem: Plan of Care - These are the overarching goals to be used throughout the patient stay.    Goal: Absence of Hospital-Acquired Illness or Injury  Intervention: Identify and Manage Fall Risk  Recent Flowsheet Documentation  Taken 3/9/2022 2200 by Alona Interiano RN  Safety Promotion/Fall Prevention: activity supervised  Intervention: Prevent Skin Injury  Recent Flowsheet Documentation  Taken 3/9/2022 1629 by Alona Interiano RN  Body Position: position changed independently  Intervention: Prevent and Manage VTE (Venous Thromboembolism) Risk  Recent Flowsheet Documentation  Taken 3/9/2022 1629 by Alona Interiano RN  Activity Management: up in chair     Problem: Fall Injury Risk  Goal: Absence of Fall and Fall-Related Injury  Outcome: Ongoing, Progressing  Intervention: Identify and Manage Contributors  Recent Flowsheet Documentation  Taken 3/9/2022 2200 by Alona Interiano RN  Medication Review/Management: medications reviewed  Intervention: Promote Injury-Free Environment  Recent Flowsheet Documentation  Taken 3/9/2022 2200 by Alona Interiano RN  Safety Promotion/Fall Prevention: activity supervised     Problem: Pain Acute  Goal: Acceptable Pain Control and Functional Ability  Outcome: Ongoing, Progressing  Intervention: Prevent or Manage Pain  Recent Flowsheet Documentation  Taken 3/9/2022 2200 by Alona Interiano RN  Medication Review/Management: medications reviewed   Goal Outcome Evaluation:

## 2022-03-10 NOTE — PLAN OF CARE
Problem: Plan of Care - These are the overarching goals to be used throughout the patient stay.    Goal: Optimal Comfort and Wellbeing  Outcome: Ongoing, Progressing  Intervention: Monitor Pain and Promote Comfort  Recent Flowsheet Documentation  Taken 3/10/2022 0901 by Loretta Orona, RN  Pain Management Interventions: medication (see MAR)     Problem: Fall Injury Risk  Goal: Absence of Fall and Fall-Related Injury  Outcome: Ongoing, Progressing  Intervention: Identify and Manage Contributors  Recent Flowsheet Documentation  Taken 3/10/2022 0815 by Loretta Orona, RN  Medication Review/Management: medications reviewed  Intervention: Promote Injury-Free Environment  Recent Flowsheet Documentation  Taken 3/10/2022 0815 by Loretta Orona, RN  Safety Promotion/Fall Prevention:   activity supervised   bed alarm on   chair alarm on   nonskid shoes/slippers when out of bed   patient and family education   safety round/check completed   supervised activity   toileting scheduled     Problem: Infection  Goal: Absence of Infection Signs and Symptoms  Outcome: Ongoing, Progressing  Intervention: Prevent or Manage Infection  Flowsheets (Taken 3/10/2022 1451)  Infection Management: cultures obtained and sent to lab   Goal Outcome Evaluation:        Patient has no complaints of pain no cough, Ambulating in haro with staff. Declines food when asked place food and will eat when he wants. Started on IV antibiotics for Empyema of left lung with 4+ gram + Cocci. Updated patients guardian. Staples on right top of head intact there are 4 staples, from fall at group home.

## 2022-03-10 NOTE — PHARMACY-VANCOMYCIN DOSING SERVICE
"Pharmacy Vancomycin Initial Note  Date of Service March 10, 2022  Patient's  1945  76 year old, male    Indication: Empyema    Current estimated CrCl = Estimated Creatinine Clearance: 75.5 mL/min (A) (based on SCr of 0.64 mg/dL (L)).    Creatinine for last 3 days  3/9/2022:  5:41 AM Creatinine 0.64 mg/dL    Recent Vancomycin Level(s) for last 3 days  No results found for requested labs within last 72 hours.      Vancomycin IV Administrations (past 72 hours)      No vancomycin orders with administrations in past 72 hours.                Nephrotoxins and other renal medications (From now, onward)    Start     Dose/Rate Route Frequency Ordered Stop    22 0200  vancomycin (VANCOCIN) 750 mg in sodium chloride 0.9 % 250 mL intermittent infusion        \"Followed by\" Linked Group Details    750 mg  over 60-90 Minutes Intravenous EVERY 12 HOURS 03/10/22 1334      03/10/22 1400  vancomycin (VANCOCIN) 1,000 mg in sodium chloride 0.9 % 250 mL intermittent infusion        \"Followed by\" Linked Group Details    1,000 mg  over 60-90 Minutes Intravenous ONCE 03/10/22 1334      03/10/22 1230  ampicillin-sulbactam (UNASYN) 3 g vial to attach to  mL bag         3 g  over 15-30 Minutes Intravenous EVERY 6 HOURS 03/10/22 1156            Contrast Orders - past 72 hours (72h ago, onward)            None          [employer] Prediction of Planned Initial Vancomycin Regimen  Loading dose: 1000 mg at 14:00 03/10/2022.  Regimen: 750 mg IV every 12 hours.  Start time: 13:32 on 03/10/2022  Exposure target: AUC24 (range)400-600 mg/L.hr   AUC24,ss: 476 mg/L.hr  Probability of AUC24 > 400: 69 %  Ctrough,ss: 14.7 mg/L  Probability of Ctrough,ss > 20: 25 %  Probability of nephrotoxicity (Lodise AL ): 10 %     Plan:  1. Start vancomycin  1000 mg IV Once, followed by 750 mg IV q12h.   2. Vancomycin monitoring method: AUC  3. Vancomycin therapeutic monitoring goal: 400-600 mg*h/L  4. Pharmacy will check vancomycin levels as " appropriate in 1-3 Days.    5. Serum creatinine levels will be ordered daily for the first week of therapy and at least twice weekly for subsequent weeks.      Martha Joe, [facility]

## 2022-03-10 NOTE — CONSULTS
Consultation - [facility]  Perry Betancourt,  1945, MRN [id number]    [facility]  Pleural effusion [J90]  Subdural hygroma [G96.08]  Fall, initial encounter [W19.XXXA]  Traumatic head injury with multiple lacerations, initial encounter [S09.90XA, S01.91XA]    PCP: Lester Sharma, [phone]   Code status:  Full Code       Extended Emergency Contact Information  Primary Emergency Contact: TERRANCE GILLETTE Phone: [phone]  Mobile Phone: [phone]  Relation: Other  Secondary Emergency Contact: MICHELLE BETANCOURT  Home Phone: [phone]  Mobile Phone: [phone]  Relation: Brother       Assessment and Plan   Active Problems:    Pleural effusion    Fall    Fall, initial encounter    Traumatic head injury with multiple lacerations, initial encounter    Impression: Left pleural empyema with 4+ gram-positive cocci on Gram stain.    Patient has poor dentition and a recent fall and head injury, so suspect oral anaerobes.    Recommendations: CT chest to evaluate for residual effusion or loculation.    If residual fluid, would recommend pulmonary medicine consultation.    With respect antimicrobial chemotherapy, empiric treatment with Unasyn and vancomycin seems appropriate.    Addendum:  Significant complex and loculated empyema on CT.  Will consult Dr Perry Martell for surgical evaluation.    Thank you for consulting [facility]    Sonido Sewell MD  [phone]     Chief Complaint <principal problem not specified>       HPI   We have been requested by Aarti Kim MD to evaluate Perry Betancourt for empyema who is a 76 year old year old male.    Patient is a 76-year-old gentleman with past history of intellectual disability and OCD who lives at a group home.    Is brought to the emergency room because of head injury after falling.    In the ED, he had a chest x-ray which showed a large pleural  effusion and so patient had a thoracentesis which returned with foamy yellow fluid with 4+ GPC's on Gram stain.  Cultures pending.    Patient states he does have a little bit of chest pain.  He also has pain on his head where he sustained a laceration.    He denies any cough, abdominal discomfort fevers chills or sweats.    Not sure the patient is the most reliable historian.    I note in care everywhere that he had Covid in February 2021.  Recent Covid testing negative including on this admission unfortunately was under a different patient ID number (MRN:  [id number])         Medical History  Patient Active Problem List   Diagnosis     Pleural effusion     Fall     Fall, initial encounter     Traumatic head injury with multiple lacerations, initial encounter     No past medical history on file. Surgical History  He  has no past surgical history on file.   Social History  Reviewed, and he  reports that he has never smoked. He does not have any smokeless tobacco history on file.   Allergies  No Known Allergies Family History  Noncontributory to current problem except as mentioned above    Psychosocial Needs  Social History     Social History Narrative     Not on file     Additional psychosocial needs reviewed per nursing assessment.     Prior to Admission Medications   Medications Prior to Admission   Medication Sig Dispense Refill Last Dose     amLODIPine (NORVASC) 5 MG tablet Take 5 mg by mouth daily    3/7/2022 at Unknown time     atenolol (TENORMIN) 25 MG tablet Take 25 mg by mouth daily    3/7/2022 at Unknown time     LORazepam (ATIVAN) 2 MG tablet Take 2 mg by mouth daily at 4pm    3/7/2022 at Unknown time     sertraline (ZOLOFT) 25 MG tablet Take 25 mg by mouth daily    3/7/2022 at Unknown time          Review of Systems:  Pertinent items are noted in HPI., otherwise all others negative. Physical Exam:  Temp:  [97.4  F (36.3  C)-98.2  F (36.8  C)] 98.2  F (36.8  C)  Pulse:  [78-90] 90  Resp:  [16-20] 16  BP:  (111-132)/(50-71) 132/71  SpO2:  [90 %-97 %] 96 %    /71 (BP Location: Left arm)   Pulse 90   Temp 98.2  F (36.8  C) (Oral)   Resp 16   SpO2 96%   General appearance: alert, appears stated age and cooperative  Head: laceration noted on top of skull  Eyes: EOMI, no icterus  Mouth:  Poor dentition  Neck: no adenopathy and supple, symmetrical, trachea midline  Lungs: decreased breath sounds on L side of chest  Heart: RRR, no murmur  Abdomen: soft, non-tender; bowel sounds normal; no masses,  no organomegaly  Extremities: warm and dry  Skin: Skin color, texture, turgor normal. No rashes or lesions  Neurologic: Mental status: alert and cooperative.  somewhat repetitive       Pertinent Labs  Lab Results: personally reviewed.   WBC Count   Date/Time Value Ref Range Status   03/09/2022 05:41 AM 10.2 4.0 - 11.0 10e3/uL Final     Hemoglobin   Date/Time Value Ref Range Status   03/09/2022 05:41 AM 7.8 (L) 13.3 - 17.7 g/dL Final     Hematocrit   Date/Time Value Ref Range Status   03/09/2022 05:41 AM 27.5 (L) 40.0 - 53.0 % Final     Platelet Count   Date/Time Value Ref Range Status   03/09/2022 05:41  150 - 450 10e3/uL Final        Sodium   Date/Time Value Ref Range Status   03/09/2022 05:41  136 - 145 mmol/L Final     Carbon Dioxide (CO2)   Date/Time Value Ref Range Status   03/09/2022 05:41 AM 25 22 - 31 mmol/L Final     Urea Nitrogen   Date/Time Value Ref Range Status   03/09/2022 05:41 AM 9 8 - 28 mg/dL Final     Results for JG ELLIOTT (MRN [id number]) as of 3/10/2022 12:33   Ref. Range 3/9/2022 09:21   Protein Total Latest Ref Range: 6.0 - 8.0 g/dL 7.3   Lactate Dehydrogenase Latest Ref Range: 125 - 220 U/L 314 (H)      Collected 3/9/2022  5:59 PM     Status: Preliminary result     Visible to patient: No (not released)    Specimen Information: Pleural Cavity, Left; Pleural fluid         0 Result Notes    Gram Stain Result   Abnormal   4+ Gram positive cocci      4+ WBC seen   Predominantly PMNs            Resulting Agency: [facility]        Pertinent Radiology  Radiology Results:   XR Chest 1 View    Result Date: 3/9/2022  EXAM: XR CHEST 1 VIEW LOCATION: [facility] DATE/TIME: 3/9/2022 2:25 PM INDICATION: Question pleural effusion. COMPARISON: None.     IMPRESSION: Heart and mediastinal size within normal limits. There is a moderate left pleural effusion. Associated compressive atelectasis is present at the left lung base with concurrent infectious process not excluded. The right lung is clear. There is  minimal amount of scarring versus atelectasis at the lateral right lung base. No pneumothorax.    US Thoracentesis    Result Date: 3/9/2022  EXAM: 1. LEFT THORACENTESIS 2. ULTRASOUND GUIDANCE LOCATION: [facility] DATE/TIME: 3/9/2022 5:05 PM INDICATION: Pleural effusion. PROCEDURE: Informed consent obtained. Time out performed. The chest was prepped and draped in sterile fashion. 5 mL of 1 % lidocaine was infused into the local soft tissues. Under direct ultrasound guidance, a 5 Mauritanian catheter system was placed into the  pleural effusion. 0.1 liters of foamy yellow material was removed and sent to lab. The foamy quality was very atypical. Patient tolerated procedure well. Ultrasound imaging was obtained and placed in the patient's permanent medical record.     IMPRESSION: Status post left ultrasound-guided thoracentesis. Reference CPT Code: 38534

## 2022-03-10 NOTE — PLAN OF CARE
Problem: Fall Injury Risk  Goal: Absence of Fall and Fall-Related Injury  Intervention: Promote Injury-Free Environment  Recent Flowsheet Documentation  Taken 3/9/2022 2335 by Maliha Copeland RN  Safety Promotion/Fall Prevention:   activity supervised   bed alarm on   nonskid shoes/slippers when out of bed   mobility aid in reach   room door open   increase visualization of patient   increased rounding and observation     Problem: Risk for Delirium  Goal: Improved Sleep  Outcome: Ongoing, Progressing     Problem: Pain Acute  Goal: Acceptable Pain Control and Functional Ability  Intervention: Prevent or Manage Pain  Recent Flowsheet Documentation  Taken 3/9/2022 2335 by Maliha Copeland, RN  Medication Review/Management: medications reviewed    Patient alert and orientated to self only. Denies pain. Pleasant. Slept majority of shift. VSS.     Maliha Copeland, MARTINEZ

## 2022-03-10 NOTE — PROGRESS NOTES
[facility]    Medicine Progress Note - Hospitalist Service    Date of Admission:  3/8/2022    Assessment & Plan           Assessment & Plan     Perry Betancourt is a 76 year old male, from group home with history of hypertension and mood disorder admitted on 3/8/2022 due to mechanical fall and head laceration. CT C-spine showed pleural fluid filling the left lung apex. Chest x-ray reportedly showed moderate to marked left sided pleural fluid collection.     Discussed with Erin and patient's Guardian, Gaurav ([phone]); both agreed with plan to proceed with left thoracentesis. Left pleural fluid analysis showed empyema with 4+ gram-positive cocci on gram stain; possibly due to oral anaerobes as per ID given poor dentition and recent fall with head injury. ID recommended Unasyn and vancomycin as well as chest CT for possible residual effusion or loculation.      Status post mechanical fall  Empyema of left pleural space    -Breathing comfortably on room air   -This was initially seen on the CT C-spine which showed pleural fluid filling the left lung apex  and was not incompletely imaged.  -Chest x-ray was obtained which showed moderate to marked left sided pleural fluid collection .  -Left pleural fluid analysis showed empyema with 4+ gram-positive cocci on gram stain; possibly due to oral anaerobes as per ID given poor dentition and recent fall with head injury.    - Unasyn and vancomycin as per ID - started 3/10/22  -Follow up Chest CT for possible residual effusion or loculation.   -ID follow up - appreciate assistance     Head injury secondary to fall  -Patient presented with mechanical fall from his group home.   CT scan of the head showed questionable subdural hygroma measuring up to 0.9 cm  versus prominence of subarachnoid space overlying the left parietal lobe.  CT C-spine  showed no acute fractures.   -Laceration on his head has been repaired in the ED.  -Fall  precaution          Hypertension-stable  -Continue PTA amlodipine, atenolol     Mood disorder  -Continue PTA sertraline, ativan     Diet: Regular  DVT Prophylaxis: Pneumatic Compression Devices  Terry Catheter: Not present  Central Lines: None  Code Status: Full code         Disposition Plan     Expected Discharge: Few days   Anticipated discharge location:  Awaiting care coordination huddle  Barriers to discharge: antibiotic therapy, pleural fluid culture, CT chest, ID recommendations.        Diet: Regular Diet Adult    DVT Prophylaxis: Pneumatic Compression Devices  Terry Catheter: Not present  Central Lines: None  Cardiac Monitoring: None  Code Status: Full Code      Disposition Plan   Expected Discharge: 03/11/2022     Anticipated discharge location:  Awaiting care coordination huddle  Delays:     placement, thoracentesis for pleural effusion with pleural fluid analysis       The patient's care was discussed with the Bedside Nurse and Patient.     Aarti Kim MD  Hospitalist Service  [facility]  Securely message with the Vocera Web Console (learn more here)  Text page via [employer] Paging/Directory         Clinically Significant Risk Factors Present on Admission                  ______________________________________________________________________    Interval History   No new complaints. He denies chest pain, fever, cough or sob. Seems confused but able to engage in conversation.      Data reviewed today: I reviewed all medications, new labs and imaging results over the last 24 hours. I personally reviewed no images or EKG's today.    Physical Exam   Vital Signs: Temp: 98.2  F (36.8  C) Temp src: Oral BP: 132/71 Pulse: 90   Resp: 16 SpO2: 96 % O2 Device: None (Room air)    Weight: 118 lbs 0 oz  Constitutional: awake, alert, cooperative, no apparent distress, frail, and appears stated age, hard of hearing  Head: wound dressing in the right parietal region - clean and dry    Mouth:  Poor dentition+   Respiratory: Diminished air entry bilaterally   Cardiovascular: Normal apical impulse, regular rate and rhythm, normal S1 and S2, no S3 or S4, and no murmur noted  GI: No scars, normal bowel sounds, soft, non-distended, non-tender, no masses palpated, no hepatosplenomegally  Musculoskeletal: There is no redness, warmth, or swelling of the joints.  Full range of motion noted.  Motor strength is 5 out of 5 all extremities bilaterally.  Tone is normal.  Neurologic: Awake, alert, oriented to name, place and time.  Motor is 5 out of 5 bilaterally.     Data   Recent Labs   Lab 03/09/22  0921 03/09/22  0541   WBC  --  10.2   HGB  --  7.8*   MCV  --  83   PLT  --  437   NA  --  141   POTASSIUM  --  3.5   CHLORIDE  --  108*   CO2  --  25   BUN  --  9   CR  --  0.64*   ANIONGAP  --  8   CRYSTAL  --  7.9*   GLC  --  109   PROTTOTAL 7.3  --

## 2022-03-11 ENCOUNTER — ANESTHESIA EVENT (OUTPATIENT)
Dept: SURGERY | Facility: HOSPITAL | Age: 77
DRG: 163 | End: 2022-03-11
Payer: MEDICARE

## 2022-03-11 LAB
ABO/RH(D): NORMAL
ANTIBODY SCREEN: NEGATIVE
ATRIAL RATE - MUSE: 92 BPM
BLD PROD TYP BPU: NORMAL
BLOOD COMPONENT TYPE: NORMAL
CODING SYSTEM: NORMAL
CREAT SERPL-MCNC: 0.63 MG/DL (ref 0.7–1.3)
CROSSMATCH: NORMAL
DIASTOLIC BLOOD PRESSURE - MUSE: NORMAL MMHG
ERYTHROCYTE [DISTWIDTH] IN BLOOD BY AUTOMATED COUNT: 18.1 % (ref 10–15)
GFR SERPL CREATININE-BSD FRML MDRD: >90 ML/MIN/1.73M2
GRAM STAIN RESULT: ABNORMAL
GRAM STAIN RESULT: ABNORMAL
HCT VFR BLD AUTO: 25.3 % (ref 40–53)
HGB BLD-MCNC: 7.2 G/DL (ref 13.3–17.7)
INTERPRETATION ECG - MUSE: NORMAL
ISSUE DATE AND TIME: NORMAL
LACTATE SERPL-SCNC: 1 MMOL/L (ref 0.7–2)
MCH RBC QN AUTO: 23.5 PG (ref 26.5–33)
MCHC RBC AUTO-ENTMCNC: 28.5 G/DL (ref 31.5–36.5)
MCV RBC AUTO: 83 FL (ref 78–100)
P AXIS - MUSE: 35 DEGREES
PLATELET # BLD AUTO: 541 10E3/UL (ref 150–450)
POTASSIUM BLD-SCNC: 2.8 MMOL/L (ref 3.5–5)
POTASSIUM BLD-SCNC: 3.5 MMOL/L (ref 3.5–5)
PR INTERVAL - MUSE: 160 MS
QRS DURATION - MUSE: 72 MS
QT - MUSE: 394 MS
QTC - MUSE: 487 MS
R AXIS - MUSE: -19 DEGREES
RBC # BLD AUTO: 3.06 10E6/UL (ref 4.4–5.9)
SPECIMEN EXPIRATION DATE: NORMAL
SYSTOLIC BLOOD PRESSURE - MUSE: NORMAL MMHG
T AXIS - MUSE: 45 DEGREES
UNIT ABO/RH: NORMAL
UNIT NUMBER: NORMAL
UNIT STATUS: NORMAL
UNIT TYPE ISBT: 600
VENTRICULAR RATE- MUSE: 92 BPM
WBC # BLD AUTO: 23.7 10E3/UL (ref 4–11)

## 2022-03-11 PROCEDURE — 84132 ASSAY OF SERUM POTASSIUM: CPT | Performed by: STUDENT IN AN ORGANIZED HEALTH CARE EDUCATION/TRAINING PROGRAM

## 2022-03-11 PROCEDURE — P9016 RBC LEUKOCYTES REDUCED: HCPCS | Performed by: STUDENT IN AN ORGANIZED HEALTH CARE EDUCATION/TRAINING PROGRAM

## 2022-03-11 PROCEDURE — 250N000013 HC RX MED GY IP 250 OP 250 PS 637: Performed by: ANESTHESIOLOGY

## 2022-03-11 PROCEDURE — 250N000013 HC RX MED GY IP 250 OP 250 PS 637: Performed by: INTERNAL MEDICINE

## 2022-03-11 PROCEDURE — 250N000011 HC RX IP 250 OP 636: Performed by: NURSE ANESTHETIST, CERTIFIED REGISTERED

## 2022-03-11 PROCEDURE — 258N000003 HC RX IP 258 OP 636: Performed by: INTERNAL MEDICINE

## 2022-03-11 PROCEDURE — 88307 TISSUE EXAM BY PATHOLOGIST: CPT | Mod: TC | Performed by: SURGERY

## 2022-03-11 PROCEDURE — 250N000025 HC SEVOFLURANE, PER MIN: Performed by: SURGERY

## 2022-03-11 PROCEDURE — 360N000077 HC SURGERY LEVEL 4, PER MIN: Performed by: SURGERY

## 2022-03-11 PROCEDURE — 36415 COLL VENOUS BLD VENIPUNCTURE: CPT | Performed by: STUDENT IN AN ORGANIZED HEALTH CARE EDUCATION/TRAINING PROGRAM

## 2022-03-11 PROCEDURE — 250N000011 HC RX IP 250 OP 636

## 2022-03-11 PROCEDURE — 258N000001 HC RX 258: Performed by: SURGERY

## 2022-03-11 PROCEDURE — 258N000003 HC RX IP 258 OP 636: Performed by: NURSE ANESTHETIST, CERTIFIED REGISTERED

## 2022-03-11 PROCEDURE — 272N000001 HC OR GENERAL SUPPLY STERILE: Performed by: SURGERY

## 2022-03-11 PROCEDURE — 258N000003 HC RX IP 258 OP 636: Performed by: ANESTHESIOLOGY

## 2022-03-11 PROCEDURE — 85027 COMPLETE CBC AUTOMATED: CPT | Performed by: SURGERY

## 2022-03-11 PROCEDURE — 87077 CULTURE AEROBIC IDENTIFY: CPT | Performed by: SURGERY

## 2022-03-11 PROCEDURE — 87205 SMEAR GRAM STAIN: CPT | Performed by: SURGERY

## 2022-03-11 PROCEDURE — 710N000011 HC RECOVERY PHASE 1, LEVEL 3, PER MIN: Performed by: SURGERY

## 2022-03-11 PROCEDURE — 84132 ASSAY OF SERUM POTASSIUM: CPT | Performed by: INTERNAL MEDICINE

## 2022-03-11 PROCEDURE — 99233 SBSQ HOSP IP/OBS HIGH 50: CPT | Performed by: INTERNAL MEDICINE

## 2022-03-11 PROCEDURE — 0B9B8ZZ DRAINAGE OF LEFT LOWER LOBE BRONCHUS, VIA NATURAL OR ARTIFICIAL OPENING ENDOSCOPIC: ICD-10-PCS | Performed by: SURGERY

## 2022-03-11 PROCEDURE — 250N000009 HC RX 250: Performed by: NURSE ANESTHETIST, CERTIFIED REGISTERED

## 2022-03-11 PROCEDURE — 36415 COLL VENOUS BLD VENIPUNCTURE: CPT | Performed by: SURGERY

## 2022-03-11 PROCEDURE — 82565 ASSAY OF CREATININE: CPT | Performed by: INTERNAL MEDICINE

## 2022-03-11 PROCEDURE — 83605 ASSAY OF LACTIC ACID: CPT | Performed by: INTERNAL MEDICINE

## 2022-03-11 PROCEDURE — 93010 ELECTROCARDIOGRAM REPORT: CPT | Mod: HIP | Performed by: INTERNAL MEDICINE

## 2022-03-11 PROCEDURE — 370N000017 HC ANESTHESIA TECHNICAL FEE, PER MIN: Performed by: SURGERY

## 2022-03-11 PROCEDURE — 99233 SBSQ HOSP IP/OBS HIGH 50: CPT

## 2022-03-11 PROCEDURE — 93005 ELECTROCARDIOGRAM TRACING: CPT

## 2022-03-11 PROCEDURE — 250N000011 HC RX IP 250 OP 636: Performed by: SURGERY

## 2022-03-11 PROCEDURE — 36415 COLL VENOUS BLD VENIPUNCTURE: CPT | Performed by: INTERNAL MEDICINE

## 2022-03-11 PROCEDURE — 999N000141 HC STATISTIC PRE-PROCEDURE NURSING ASSESSMENT: Performed by: SURGERY

## 2022-03-11 PROCEDURE — 120N000001 HC R&B MED SURG/OB

## 2022-03-11 PROCEDURE — 0BBJ4ZX EXCISION OF LEFT LOWER LUNG LOBE, PERCUTANEOUS ENDOSCOPIC APPROACH, DIAGNOSTIC: ICD-10-PCS | Performed by: SURGERY

## 2022-03-11 PROCEDURE — 258N000003 HC RX IP 258 OP 636: Performed by: SURGERY

## 2022-03-11 PROCEDURE — 87070 CULTURE OTHR SPECIMN AEROBIC: CPT | Performed by: SURGERY

## 2022-03-11 PROCEDURE — 250N000011 HC RX IP 250 OP 636: Performed by: ANESTHESIOLOGY

## 2022-03-11 PROCEDURE — 250N000011 HC RX IP 250 OP 636: Performed by: INTERNAL MEDICINE

## 2022-03-11 PROCEDURE — 0BBP4ZZ EXCISION OF LEFT PLEURA, PERCUTANEOUS ENDOSCOPIC APPROACH: ICD-10-PCS | Performed by: SURGERY

## 2022-03-11 PROCEDURE — 87075 CULTR BACTERIA EXCEPT BLOOD: CPT | Performed by: SURGERY

## 2022-03-11 PROCEDURE — 0BBG4ZX EXCISION OF LEFT UPPER LUNG LOBE, PERCUTANEOUS ENDOSCOPIC APPROACH, DIAGNOSTIC: ICD-10-PCS | Performed by: SURGERY

## 2022-03-11 PROCEDURE — 250N000009 HC RX 250: Performed by: SURGERY

## 2022-03-11 PROCEDURE — P9041 ALBUMIN (HUMAN),5%, 50ML: HCPCS | Performed by: ANESTHESIOLOGY

## 2022-03-11 PROCEDURE — 0B988ZZ DRAINAGE OF LEFT UPPER LOBE BRONCHUS, VIA NATURAL OR ARTIFICIAL OPENING ENDOSCOPIC: ICD-10-PCS | Performed by: SURGERY

## 2022-03-11 PROCEDURE — 250N000013 HC RX MED GY IP 250 OP 250 PS 637: Performed by: HOSPITALIST

## 2022-03-11 PROCEDURE — 94002 VENT MGMT INPAT INIT DAY: CPT

## 2022-03-11 PROCEDURE — 999N000157 HC STATISTIC RCP TIME EA 10 MIN

## 2022-03-11 RX ORDER — FENTANYL CITRATE 50 UG/ML
25 INJECTION, SOLUTION INTRAMUSCULAR; INTRAVENOUS EVERY 5 MIN PRN
Status: DISCONTINUED | OUTPATIENT
Start: 2022-03-11 | End: 2022-03-11 | Stop reason: HOSPADM

## 2022-03-11 RX ORDER — MAGNESIUM HYDROXIDE 1200 MG/15ML
LIQUID ORAL PRN
Status: DISCONTINUED | OUTPATIENT
Start: 2022-03-11 | End: 2022-03-11 | Stop reason: HOSPADM

## 2022-03-11 RX ORDER — ACETAMINOPHEN 325 MG/1
975 TABLET ORAL EVERY 8 HOURS
Status: DISCONTINUED | OUTPATIENT
Start: 2022-03-11 | End: 2022-03-11 | Stop reason: ALTCHOICE

## 2022-03-11 RX ORDER — LIDOCAINE HYDROCHLORIDE 20 MG/ML
INJECTION, SOLUTION INFILTRATION; PERINEURAL PRN
Status: DISCONTINUED | OUTPATIENT
Start: 2022-03-11 | End: 2022-03-11

## 2022-03-11 RX ORDER — FENTANYL CITRATE 50 UG/ML
INJECTION, SOLUTION INTRAMUSCULAR; INTRAVENOUS PRN
Status: DISCONTINUED | OUTPATIENT
Start: 2022-03-11 | End: 2022-03-11

## 2022-03-11 RX ORDER — NALOXONE HYDROCHLORIDE 0.4 MG/ML
0.4 INJECTION, SOLUTION INTRAMUSCULAR; INTRAVENOUS; SUBCUTANEOUS
Status: DISCONTINUED | OUTPATIENT
Start: 2022-03-11 | End: 2022-03-21 | Stop reason: HOSPADM

## 2022-03-11 RX ORDER — AMOXICILLIN 250 MG
1 CAPSULE ORAL 2 TIMES DAILY
Status: DISCONTINUED | OUTPATIENT
Start: 2022-03-11 | End: 2022-03-11 | Stop reason: ALTCHOICE

## 2022-03-11 RX ORDER — HYDROMORPHONE HYDROCHLORIDE 4 MG/1
4 TABLET ORAL EVERY 4 HOURS PRN
Status: DISCONTINUED | OUTPATIENT
Start: 2022-03-11 | End: 2022-03-21 | Stop reason: HOSPADM

## 2022-03-11 RX ORDER — ONDANSETRON 4 MG/1
4 TABLET, ORALLY DISINTEGRATING ORAL EVERY 6 HOURS PRN
Status: DISCONTINUED | OUTPATIENT
Start: 2022-03-11 | End: 2022-03-11

## 2022-03-11 RX ORDER — POTASSIUM CHLORIDE 1500 MG/1
40 TABLET, EXTENDED RELEASE ORAL ONCE
Status: COMPLETED | OUTPATIENT
Start: 2022-03-11 | End: 2022-03-11

## 2022-03-11 RX ORDER — SODIUM CHLORIDE, SODIUM LACTATE, POTASSIUM CHLORIDE, CALCIUM CHLORIDE 600; 310; 30; 20 MG/100ML; MG/100ML; MG/100ML; MG/100ML
INJECTION, SOLUTION INTRAVENOUS CONTINUOUS
Status: DISCONTINUED | OUTPATIENT
Start: 2022-03-11 | End: 2022-03-11 | Stop reason: HOSPADM

## 2022-03-11 RX ORDER — ONDANSETRON 2 MG/ML
INJECTION INTRAMUSCULAR; INTRAVENOUS PRN
Status: DISCONTINUED | OUTPATIENT
Start: 2022-03-11 | End: 2022-03-11

## 2022-03-11 RX ORDER — EPHEDRINE SULFATE 50 MG/ML
INJECTION, SOLUTION INTRAMUSCULAR; INTRAVENOUS; SUBCUTANEOUS PRN
Status: DISCONTINUED | OUTPATIENT
Start: 2022-03-11 | End: 2022-03-11

## 2022-03-11 RX ORDER — NALOXONE HYDROCHLORIDE 0.4 MG/ML
0.2 INJECTION, SOLUTION INTRAMUSCULAR; INTRAVENOUS; SUBCUTANEOUS
Status: DISCONTINUED | OUTPATIENT
Start: 2022-03-11 | End: 2022-03-21 | Stop reason: HOSPADM

## 2022-03-11 RX ORDER — HYDROMORPHONE HYDROCHLORIDE 2 MG/1
2 TABLET ORAL EVERY 4 HOURS PRN
Status: DISCONTINUED | OUTPATIENT
Start: 2022-03-11 | End: 2022-03-21 | Stop reason: HOSPADM

## 2022-03-11 RX ORDER — BISACODYL 10 MG
10 SUPPOSITORY, RECTAL RECTAL DAILY PRN
Status: DISCONTINUED | OUTPATIENT
Start: 2022-03-11 | End: 2022-03-21 | Stop reason: HOSPADM

## 2022-03-11 RX ORDER — MAGNESIUM SULFATE 4 G/50ML
4 INJECTION INTRAVENOUS ONCE
Status: COMPLETED | OUTPATIENT
Start: 2022-03-11 | End: 2022-03-11

## 2022-03-11 RX ORDER — PROPOFOL 10 MG/ML
INJECTION, EMULSION INTRAVENOUS PRN
Status: DISCONTINUED | OUTPATIENT
Start: 2022-03-11 | End: 2022-03-11

## 2022-03-11 RX ORDER — PROCHLORPERAZINE MALEATE 5 MG
5 TABLET ORAL EVERY 6 HOURS PRN
Status: DISCONTINUED | OUTPATIENT
Start: 2022-03-11 | End: 2022-03-21 | Stop reason: HOSPADM

## 2022-03-11 RX ORDER — MEPERIDINE HYDROCHLORIDE 25 MG/ML
12.5 INJECTION INTRAMUSCULAR; INTRAVENOUS; SUBCUTANEOUS
Status: DISCONTINUED | OUTPATIENT
Start: 2022-03-11 | End: 2022-03-11 | Stop reason: HOSPADM

## 2022-03-11 RX ORDER — ONDANSETRON 2 MG/ML
4 INJECTION INTRAMUSCULAR; INTRAVENOUS EVERY 30 MIN PRN
Status: DISCONTINUED | OUTPATIENT
Start: 2022-03-11 | End: 2022-03-11 | Stop reason: HOSPADM

## 2022-03-11 RX ORDER — ONDANSETRON 4 MG/1
4 TABLET, ORALLY DISINTEGRATING ORAL EVERY 30 MIN PRN
Status: DISCONTINUED | OUTPATIENT
Start: 2022-03-11 | End: 2022-03-11 | Stop reason: HOSPADM

## 2022-03-11 RX ORDER — FENTANYL CITRATE 50 UG/ML
25 INJECTION, SOLUTION INTRAMUSCULAR; INTRAVENOUS
Status: CANCELLED | OUTPATIENT
Start: 2022-03-11

## 2022-03-11 RX ORDER — DEXAMETHASONE SODIUM PHOSPHATE 10 MG/ML
INJECTION, SOLUTION INTRAMUSCULAR; INTRAVENOUS PRN
Status: DISCONTINUED | OUTPATIENT
Start: 2022-03-11 | End: 2022-03-11

## 2022-03-11 RX ORDER — BACITRACIN ZINC 500 [USP'U]/G
OINTMENT TOPICAL PRN
Status: DISCONTINUED | OUTPATIENT
Start: 2022-03-11 | End: 2022-03-11 | Stop reason: HOSPADM

## 2022-03-11 RX ORDER — LIDOCAINE 40 MG/G
CREAM TOPICAL
Status: DISCONTINUED | OUTPATIENT
Start: 2022-03-11 | End: 2022-03-11 | Stop reason: HOSPADM

## 2022-03-11 RX ORDER — CEFAZOLIN SODIUM/WATER 2 G/20 ML
2 SYRINGE (ML) INTRAVENOUS SEE ADMIN INSTRUCTIONS
Status: DISCONTINUED | OUTPATIENT
Start: 2022-03-11 | End: 2022-03-11 | Stop reason: HOSPADM

## 2022-03-11 RX ORDER — PROPOFOL 10 MG/ML
INJECTION, EMULSION INTRAVENOUS CONTINUOUS PRN
Status: DISCONTINUED | OUTPATIENT
Start: 2022-03-11 | End: 2022-03-11

## 2022-03-11 RX ORDER — SODIUM CHLORIDE 9 MG/ML
INJECTION, SOLUTION INTRAVENOUS CONTINUOUS PRN
Status: DISCONTINUED | OUTPATIENT
Start: 2022-03-11 | End: 2022-03-11

## 2022-03-11 RX ORDER — ONDANSETRON 2 MG/ML
4 INJECTION INTRAMUSCULAR; INTRAVENOUS EVERY 6 HOURS PRN
Status: DISCONTINUED | OUTPATIENT
Start: 2022-03-11 | End: 2022-03-11

## 2022-03-11 RX ORDER — LIDOCAINE 40 MG/G
CREAM TOPICAL
Status: DISCONTINUED | OUTPATIENT
Start: 2022-03-11 | End: 2022-03-21 | Stop reason: HOSPADM

## 2022-03-11 RX ORDER — HYDROMORPHONE HYDROCHLORIDE 1 MG/ML
0.2 INJECTION, SOLUTION INTRAMUSCULAR; INTRAVENOUS; SUBCUTANEOUS EVERY 5 MIN PRN
Status: DISCONTINUED | OUTPATIENT
Start: 2022-03-11 | End: 2022-03-11 | Stop reason: HOSPADM

## 2022-03-11 RX ORDER — ACETAMINOPHEN 325 MG/1
975 TABLET ORAL ONCE
Status: COMPLETED | OUTPATIENT
Start: 2022-03-11 | End: 2022-03-11

## 2022-03-11 RX ORDER — OXYCODONE HYDROCHLORIDE 5 MG/1
5 TABLET ORAL EVERY 4 HOURS PRN
Status: DISCONTINUED | OUTPATIENT
Start: 2022-03-11 | End: 2022-03-11 | Stop reason: HOSPADM

## 2022-03-11 RX ORDER — CEFAZOLIN SODIUM/WATER 2 G/20 ML
2 SYRINGE (ML) INTRAVENOUS
Status: COMPLETED | OUTPATIENT
Start: 2022-03-11 | End: 2022-03-11

## 2022-03-11 RX ORDER — HYDROMORPHONE HCL IN WATER/PF 6 MG/30 ML
0.2 PATIENT CONTROLLED ANALGESIA SYRINGE INTRAVENOUS
Status: DISCONTINUED | OUTPATIENT
Start: 2022-03-11 | End: 2022-03-21 | Stop reason: HOSPADM

## 2022-03-11 RX ORDER — ACETAMINOPHEN 325 MG/1
650 TABLET ORAL EVERY 4 HOURS PRN
Status: DISCONTINUED | OUTPATIENT
Start: 2022-03-14 | End: 2022-03-11 | Stop reason: ALTCHOICE

## 2022-03-11 RX ORDER — POLYETHYLENE GLYCOL 3350 17 G/17G
17 POWDER, FOR SOLUTION ORAL DAILY
Status: DISCONTINUED | OUTPATIENT
Start: 2022-03-12 | End: 2022-03-18

## 2022-03-11 RX ORDER — ALBUMIN, HUMAN INJ 5% 5 %
250 SOLUTION INTRAVENOUS ONCE
Status: COMPLETED | OUTPATIENT
Start: 2022-03-11 | End: 2022-03-11

## 2022-03-11 RX ORDER — HYDROMORPHONE HCL IN WATER/PF 6 MG/30 ML
0.4 PATIENT CONTROLLED ANALGESIA SYRINGE INTRAVENOUS
Status: DISCONTINUED | OUTPATIENT
Start: 2022-03-11 | End: 2022-03-21 | Stop reason: HOSPADM

## 2022-03-11 RX ADMIN — SERTRALINE HYDROCHLORIDE 25 MG: 25 TABLET ORAL at 09:52

## 2022-03-11 RX ADMIN — SODIUM CHLORIDE, POTASSIUM CHLORIDE, SODIUM LACTATE AND CALCIUM CHLORIDE: 600; 310; 30; 20 INJECTION, SOLUTION INTRAVENOUS at 11:45

## 2022-03-11 RX ADMIN — PHENYLEPHRINE HYDROCHLORIDE 100 MCG: 10 INJECTION INTRAVENOUS at 14:10

## 2022-03-11 RX ADMIN — PHENYLEPHRINE HYDROCHLORIDE 200 MCG: 10 INJECTION INTRAVENOUS at 13:12

## 2022-03-11 RX ADMIN — MIDAZOLAM 1 MG: 1 INJECTION INTRAMUSCULAR; INTRAVENOUS at 12:08

## 2022-03-11 RX ADMIN — MAGNESIUM SULFATE HEPTAHYDRATE 4 G: 80 INJECTION, SOLUTION INTRAVENOUS at 11:50

## 2022-03-11 RX ADMIN — VANCOMYCIN HYDROCHLORIDE 750 MG: 1 INJECTION, POWDER, LYOPHILIZED, FOR SOLUTION INTRAVENOUS at 02:08

## 2022-03-11 RX ADMIN — PHENYLEPHRINE HYDROCHLORIDE 100 MCG: 10 INJECTION INTRAVENOUS at 13:36

## 2022-03-11 RX ADMIN — FENTANYL CITRATE 50 MCG: 50 INJECTION, SOLUTION INTRAMUSCULAR; INTRAVENOUS at 12:48

## 2022-03-11 RX ADMIN — ACETAMINOPHEN 975 MG: 325 TABLET ORAL at 09:52

## 2022-03-11 RX ADMIN — DOCUSATE SODIUM 50 MG AND SENNOSIDES 8.6 MG 1 TABLET: 8.6; 5 TABLET, FILM COATED ORAL at 20:22

## 2022-03-11 RX ADMIN — AMPICILLIN SODIUM AND SULBACTAM SODIUM 3 G: 2; 1 INJECTION, POWDER, FOR SOLUTION INTRAMUSCULAR; INTRAVENOUS at 00:22

## 2022-03-11 RX ADMIN — ROCURONIUM BROMIDE 20 MG: 50 INJECTION, SOLUTION INTRAVENOUS at 13:29

## 2022-03-11 RX ADMIN — PHENYLEPHRINE HYDROCHLORIDE 100 MCG: 10 INJECTION INTRAVENOUS at 13:34

## 2022-03-11 RX ADMIN — PHENYLEPHRINE HYDROCHLORIDE 100 MCG: 10 INJECTION INTRAVENOUS at 14:26

## 2022-03-11 RX ADMIN — VANCOMYCIN HYDROCHLORIDE 750 MG: 1 INJECTION, POWDER, LYOPHILIZED, FOR SOLUTION INTRAVENOUS at 19:57

## 2022-03-11 RX ADMIN — Medication 5 MG: at 13:24

## 2022-03-11 RX ADMIN — LIDOCAINE HYDROCHLORIDE 30 MG: 20 INJECTION, SOLUTION INFILTRATION; PERINEURAL at 12:16

## 2022-03-11 RX ADMIN — AMPICILLIN SODIUM AND SULBACTAM SODIUM 3 G: 2; 1 INJECTION, POWDER, FOR SOLUTION INTRAMUSCULAR; INTRAVENOUS at 06:07

## 2022-03-11 RX ADMIN — ACETAMINOPHEN 975 MG: 325 TABLET ORAL at 11:44

## 2022-03-11 RX ADMIN — FENTANYL CITRATE 50 MCG: 50 INJECTION, SOLUTION INTRAMUSCULAR; INTRAVENOUS at 12:16

## 2022-03-11 RX ADMIN — Medication 5 MG: at 14:10

## 2022-03-11 RX ADMIN — Medication 2 G: at 12:34

## 2022-03-11 RX ADMIN — PHENYLEPHRINE HYDROCHLORIDE 100 MCG: 10 INJECTION INTRAVENOUS at 13:24

## 2022-03-11 RX ADMIN — PHENYLEPHRINE HYDROCHLORIDE 100 MCG: 10 INJECTION INTRAVENOUS at 13:18

## 2022-03-11 RX ADMIN — PHENYLEPHRINE HYDROCHLORIDE 100 MCG: 10 INJECTION INTRAVENOUS at 13:07

## 2022-03-11 RX ADMIN — Medication 5 MG: at 13:39

## 2022-03-11 RX ADMIN — DEXTROSE AND SODIUM CHLORIDE: 5; 450 INJECTION, SOLUTION INTRAVENOUS at 15:57

## 2022-03-11 RX ADMIN — PROPOFOL 30 MG: 10 INJECTION, EMULSION INTRAVENOUS at 12:52

## 2022-03-11 RX ADMIN — PHENYLEPHRINE HYDROCHLORIDE 200 MCG: 10 INJECTION INTRAVENOUS at 13:09

## 2022-03-11 RX ADMIN — ACETAMINOPHEN 975 MG: 325 TABLET ORAL at 20:22

## 2022-03-11 RX ADMIN — Medication 5 MG: at 14:03

## 2022-03-11 RX ADMIN — ACETAMINOPHEN 975 MG: 325 TABLET ORAL at 00:20

## 2022-03-11 RX ADMIN — ONDANSETRON 4 MG: 2 INJECTION INTRAMUSCULAR; INTRAVENOUS at 14:21

## 2022-03-11 RX ADMIN — FENTANYL CITRATE 100 MCG: 50 INJECTION, SOLUTION INTRAMUSCULAR; INTRAVENOUS at 12:45

## 2022-03-11 RX ADMIN — HYDROMORPHONE HYDROCHLORIDE 0.5 MG: 1 INJECTION, SOLUTION INTRAMUSCULAR; INTRAVENOUS; SUBCUTANEOUS at 13:05

## 2022-03-11 RX ADMIN — PHENYLEPHRINE HYDROCHLORIDE 100 MCG: 10 INJECTION INTRAVENOUS at 13:50

## 2022-03-11 RX ADMIN — PHENYLEPHRINE HYDROCHLORIDE 0.2 MCG/KG/MIN: 10 INJECTION INTRAVENOUS at 13:28

## 2022-03-11 RX ADMIN — AMPICILLIN SODIUM AND SULBACTAM SODIUM 3 G: 2; 1 INJECTION, POWDER, FOR SOLUTION INTRAMUSCULAR; INTRAVENOUS at 19:08

## 2022-03-11 RX ADMIN — DEXAMETHASONE SODIUM PHOSPHATE 10 MG: 10 INJECTION, SOLUTION INTRAMUSCULAR; INTRAVENOUS at 12:16

## 2022-03-11 RX ADMIN — PHENYLEPHRINE HYDROCHLORIDE 200 MCG: 10 INJECTION INTRAVENOUS at 14:06

## 2022-03-11 RX ADMIN — SODIUM CHLORIDE, POTASSIUM CHLORIDE, SODIUM LACTATE AND CALCIUM CHLORIDE 500 ML: 600; 310; 30; 20 INJECTION, SOLUTION INTRAVENOUS at 19:48

## 2022-03-11 RX ADMIN — SODIUM CHLORIDE: 9 INJECTION, SOLUTION INTRAVENOUS at 13:28

## 2022-03-11 RX ADMIN — SUGAMMADEX 200 MG: 100 INJECTION, SOLUTION INTRAVENOUS at 14:34

## 2022-03-11 RX ADMIN — POTASSIUM CHLORIDE 40 MEQ: 1500 TABLET, EXTENDED RELEASE ORAL at 01:11

## 2022-03-11 RX ADMIN — ALBUMIN (HUMAN) 250 ML: 12.5 INJECTION, SOLUTION INTRAVENOUS at 15:34

## 2022-03-11 RX ADMIN — SODIUM CHLORIDE, POTASSIUM CHLORIDE, SODIUM LACTATE AND CALCIUM CHLORIDE: 600; 310; 30; 20 INJECTION, SOLUTION INTRAVENOUS at 14:08

## 2022-03-11 RX ADMIN — PHENYLEPHRINE HYDROCHLORIDE 200 MCG: 10 INJECTION INTRAVENOUS at 13:20

## 2022-03-11 RX ADMIN — PROPOFOL 50 MG: 10 INJECTION, EMULSION INTRAVENOUS at 12:12

## 2022-03-11 RX ADMIN — PHENYLEPHRINE HYDROCHLORIDE 100 MCG: 10 INJECTION INTRAVENOUS at 14:03

## 2022-03-11 RX ADMIN — PHENYLEPHRINE HYDROCHLORIDE 200 MCG: 10 INJECTION INTRAVENOUS at 13:16

## 2022-03-11 RX ADMIN — PROPOFOL 70 MCG/KG/MIN: 10 INJECTION, EMULSION INTRAVENOUS at 12:52

## 2022-03-11 RX ADMIN — PHENYLEPHRINE HYDROCHLORIDE 100 MCG: 10 INJECTION INTRAVENOUS at 13:39

## 2022-03-11 ASSESSMENT — ACTIVITIES OF DAILY LIVING (ADL)
ADLS_ACUITY_SCORE: 15
ADLS_ACUITY_SCORE: 14
ADLS_ACUITY_SCORE: 14
ADLS_ACUITY_SCORE: 15
ADLS_ACUITY_SCORE: 14
ADLS_ACUITY_SCORE: 15
ADLS_ACUITY_SCORE: 14
ADLS_ACUITY_SCORE: 15
ADLS_ACUITY_SCORE: 14
ADLS_ACUITY_SCORE: 15
ADLS_ACUITY_SCORE: 15
ADLS_ACUITY_SCORE: 14
ADLS_ACUITY_SCORE: 15
ADLS_ACUITY_SCORE: 14
ADLS_ACUITY_SCORE: 15
ADLS_ACUITY_SCORE: 14
ADLS_ACUITY_SCORE: 15
ADLS_ACUITY_SCORE: 14
ADLS_ACUITY_SCORE: 15
ADLS_ACUITY_SCORE: 14

## 2022-03-11 ASSESSMENT — ENCOUNTER SYMPTOMS: SEIZURES: 0

## 2022-03-11 NOTE — PLAN OF CARE
Problem: Plan of Care - These are the overarching goals to be used throughout the patient stay.    Goal: Optimal Comfort and Wellbeing  Outcome: Ongoing, Progressing     Problem: Fall Injury Risk  Goal: Absence of Fall and Fall-Related Injury  Outcome: Ongoing, Progressing     Problem: Pain Acute  Goal: Acceptable Pain Control and Functional Ability  Outcome: Ongoing, Progressing     Problem: Infection  Goal: Absence of Infection Signs and Symptoms  Outcome: Ongoing, Progressing   Goal Outcome Evaluation:      Patient doing well, has no complaints of pain. Sitting up  in chair. Up to bathroom, SBA. Continues on IV antibiotics. Dr. Martell in to see patient, plan to have surgery to left lung empyema today. Patient went down at 11:30.

## 2022-03-11 NOTE — OP NOTE
OPERATIVE REPORT    Perry Betancourt   [facility]  Medical Record #:  [id number]  YOB: 1945  Age:  76 year old    PROCEDURE DATE:  3/8/2022 - 3/11/2022    PREOPERATIVE DIAGNOSIS: Left sided thoracic empyema with pneumonia    POSTOPERATIVE DIAGNOSIS: Same with marked thickening of the parietal pleura and approximately 1 to 2 cm peel around the majority of the left thorax and left upper and lower lobes    PROCEDURE: 1.  Thoracoscopy with lavage and cleanout left lower lobe and left upper lobe 2.  Left thoracoscopy with initial dissection with need for conversion to thoracotomy for longstanding marked empyema and thick peel 3.  Left thoracotomy complete decortication and complete parietal pleurectomy placement of 336 Croatian chest tubes    OPERATING SURGEON:  Perry Martell MD    ASSISTANT:     ANESTHESIA: Tech    DESCRIPTION OF PROCEDURE: With the patient first on the supine position and general tracheal anesthesia and subsequently in the right lateral position the left chest up having reviewed risk benefits complications of surgical intervention with the legal guardian Gaurav patient's brother left chest is prepped in usual sterile fashion.  Bronchoscopy is completed and to clean out of the left upper and lower lobes is completed and examination revealed the absence of significant secretions in the right bronchial tree.  Thoracoscopy was initiated after a blunt dissection used to enter the thorax safely and it became apparent that the patient's clinical condition with unknown timeframe preoperatively was very long in the timeframe of probably weeks or longer.  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  As result is reviewed with his brother standard left posterior lateral thoracotomy incision is made the extra parietal pleural space is entered and extra parietal resection of the entire parietal pleura is completed with care to avoid injury neurovascular structures.  The upper  lobe was completely compressed.  The lower lobe was significantly compressed to approximately 40% of normal size.  In sequential fashion parietal pleurectomy is completed and then visceral pleurectomy of upper and lower lobes and interlobar surfaces is completed.  Insufflation lung reveals a small air leaks and no major air leaks.  No major bleeding encountered.  The thorax is irrigated with antibiotic solution throughout the operative intervention.  Gram stain aerobic anaerobic cultures obtained from the marked purulent process posterior aspect of the left chest and the main empyema cavity.  With a good insufflation of the lung at 336 Frisian chest tubes are placed and secured and the wound closed in usual fashion running #1 Prolene reinforced #1 Vicryl muscle and subcutaneous layers closed with running #1 Vicryl and skin closed with 3-0 Vicryl subcu suture and Dermabond.  The estimated blood loss 50 cc.  There were no complications and the patient tolerated procedure well.  Sponge and counts are correct x2.    EBL:  [unfilled]    SPECIMENS:    ID Type Source Tests Collected by Time Destination   1 : LEFT CHEST DECORTICATION Tissue Other SURGICAL PATHOLOGY EXAM Perry Reveles MD 3/11/2022  2:00 PM    A : LEFT EMPYEMA FLUID Wound Other ANAEROBIC BACTERIAL CULTURE ROUTINE, GRAM STAIN, AEROBIC BACTERIAL CULTURE ROUTINE Perry Reveles MD 3/11/2022  1:15 PM        Perry reveles md  [facility]

## 2022-03-11 NOTE — ANESTHESIA PREPROCEDURE EVALUATION
Anesthesia Pre-Procedure Evaluation    Patient: Perry Betancourt   MRN: [id number] : 1945        Procedure : Procedure(s):  THORACOSCOPY          History reviewed. No pertinent past medical history.   History reviewed. No pertinent surgical history.   No Known Allergies   Social History     Tobacco Use     Smoking status: Never Smoker     Smokeless tobacco: Not on file   Substance Use Topics     Alcohol use: Not on file      Wt Readings from Last 1 Encounters:   03/10/22 53.5 kg (118 lb)        Anesthesia Evaluation   Pt has had prior anesthetic.     No history of anesthetic complications       ROS/MED HX  ENT/Pulmonary: Comment: Left pleural empyema   (-) sleep apnea   Neurologic: Comment: Left subdural hematoma vs hygroma   (-) no seizures and no CVA   Cardiovascular:  - neg cardiovascular ROS  (-) CAD   METS/Exercise Tolerance:     Hematologic:  - neg hematologic  ROS     Musculoskeletal:  - neg musculoskeletal ROS     GI/Hepatic:  - neg GI/hepatic ROS     Renal/Genitourinary:  - neg Renal ROS     Endo:  - neg endo ROS     Psychiatric/Substance Use:  - neg psychiatric ROS     Infectious Disease:     (+) Recent Fever,     Malignancy:       Other:            Physical Exam    Airway  airway exam normal      Mallampati: II   TM distance: > 3 FB   Neck ROM: full   Mouth opening: > 3 cm    Respiratory Devices and Support         Dental       (+) chipped and missing      Cardiovascular   cardiovascular exam normal       Rhythm and rate: regular and normal     Pulmonary   pulmonary exam normal        breath sounds clear to auscultation       Other findings: Alert but mental deficit evident    OUTSIDE LABS:  CBC:   Lab Results   Component Value Date    WBC 15.8 (H) 03/10/2022    WBC 10.2 2022    HGB 9.7 (L) 03/10/2022    HGB 7.8 (L) 2022    HCT 33.7 (L) 03/10/2022    HCT 27.5 (L) 2022     (H) 03/10/2022     2022     BMP:   Lab Results   Component Value Date     03/10/2022      03/09/2022    POTASSIUM 3.5 03/11/2022    POTASSIUM 2.8 (LL) 03/11/2022    CHLORIDE 104 03/10/2022    CHLORIDE 108 (H) 03/09/2022    CO2 24 03/10/2022    CO2 25 03/09/2022    BUN 10 03/10/2022    BUN 9 03/09/2022    CR 0.63 (L) 03/11/2022    CR 0.68 (L) 03/10/2022     (H) 03/10/2022     03/09/2022     COAGS: No results found for: PTT, INR, FIBR  POC: No results found for: BGM, HCG, HCGS  HEPATIC:   Lab Results   Component Value Date    PROTTOTAL 7.3 03/09/2022     OTHER:   Lab Results   Component Value Date    LACT 1.0 03/11/2022    CRYSTAL 8.0 (L) 03/10/2022       Anesthesia Plan    ASA Status:  3      Anesthesia Type: General.     - Airway: ETT         Techniques and Equipment:     - Airway: Fiberoptic Bronchoscope, Double lumen ETT         Consents    Anesthesia Plan(s) and associated risks, benefits, and realistic alternatives discussed. Questions answered and patient/representative(s) expressed understanding.    - Discussed:     - Discussed with:  Other (See Comment)      - Specific Concerns: Consent obtained via patient's brotherGaurav via telephone.        Postoperative Care    Pain management: IV analgesics, Oral pain medications, Multi-modal analgesia.   PONV prophylaxis: Ondansetron (or other 5HT-3), Dexamethasone or Solumedrol     Comments:    Other Comments:     COVID test not obtained yet, will use contact precautions and swab after induction of anesthesia    I performed this patient s intubation which required substantially increased work beyond that of the typical emergency endotracheal intubation based on the need to use COVID-19 precaution which required increased time and skill employed through the use of personal protective equipment in preparing for and during the intubation as well as the additional time spent properly disposing of the equipment upon completion of the procedure.            Jose Juan Pena MD

## 2022-03-11 NOTE — ANESTHESIA PROCEDURE NOTES
Airway       Patient location during procedure: OR       Procedure Start/Stop Times: 3/11/2022 12:20 PM  Staff -        Anesthesiologist:  Jose Juan Pena MD       CRNA: Arnoldo Muniz APRN CRNA       Performed By: CRNAIndications and Patient Condition       Indications for airway management: tyler-procedural       Induction type:intravenous       Mask difficulty assessment: 2 - vent by mask + OA or adjuvant +/- NMBA    Final Airway Details       Final airway type: endotracheal airway       Successful airway: ETT - double lumen left  Endotracheal Airway Details        Cuffed: yes       Successful intubation technique: direct laryngoscopy       DL Blade Type: Moreno 2       Grade View of Cords: 1       Adjucts: stylet and tooth guard       Position: Center       Measured from: lips       Secured at (cm): 29       Bite block used: None       ETT Double lumen (fr): 37    Post intubation assessment        Number of attempts at approach: 1       Number of other approaches attempted: 0       Secured with: silk tape       Ease of procedure: easy       Dentition: Intact and Unchanged    Additional Comments       Placement verified via bronchoscope by Dr. Pena.

## 2022-03-11 NOTE — CONSULTS
GENERAL SURGERY CONSULTATION    Perry Betancourt  [place]  Medical Record #:  [id number]  YOB: 1945  Age:  76 year old     Date of Consultation: 3/11/2022    Reason for Consultation: Left thoracic empyema    Perry Betancourt is a 76 year old male who presents with a history of trauma with admission the hospital on 3/8/2022 with finding of minor laceration of the scalp and on additional evaluation found to have a left pleural effusion which has 4+ gram-positive cocci on Gram stain.  The patient had been apparently not doing quite so well in his group home setting but no specific fevers chills or chronic cough.  He has not had these type of symptoms or findings previously.  At the current time seen general care [facility] setting.  He has mild discomfort on the head.  He has significant mental incapacity and has been living at the group home for extended length of time.  I reviewed his clinical situation with his brother Gaurav.    PHH:  History reviewed. No pertinent past medical history.   History reviewed. No pertinent surgical history.    ALLERGIES:  Patient has no known allergies.    MEDS:    Current Facility-Administered Medications:      acetaminophen (TYLENOL) tablet 975 mg, 975 mg, Oral, Q8H, Ofelia Higuera MD, 975 mg at 03/11/22 0952     [Held by provider] amLODIPine (NORVASC) tablet 5 mg, 5 mg, Oral, Daily, Ofelia Higuera MD, 5 mg at 03/08/22 1645     ampicillin-sulbactam (UNASYN) 3 g vial to attach to  mL bag, 3 g, Intravenous, Q6H, Sonido Sewell MD, 3 g at 03/11/22 0607     [Held by provider] atenolol (TENORMIN) tablet 25 mg, 25 mg, Oral, Daily, Ofelia Higuera MD, 25 mg at 03/08/22 1645     LORazepam (ATIVAN) tablet 2 mg, 2 mg, Oral, Daily at 4 pm, Ofelia Higuera MD, 2 mg at 03/10/22 1642     melatonin tablet 1 mg, 1 mg, Oral, At Bedtime PRN, Ofelia Higuera MD, 1 mg at 03/10/22 2103     ondansetron (ZOFRAN-ODT) ODT tab 4 mg, 4 mg, Oral, Q6H PRN, 4 mg at 03/09/22 6514 **OR**  "ondansetron (ZOFRAN) injection 4 mg, 4 mg, Intravenous, Q6H PRN, Ofelia Higuera MD     senna-docusate (SENOKOT-S/PERICOLACE) 8.6-50 MG per tablet 1 tablet, 1 tablet, Oral, BID, 1 tablet at 03/10/22 0817 **OR** senna-docusate (SENOKOT-S/PERICOLACE) 8.6-50 MG per tablet 2 tablet, 2 tablet, Oral, BID, Ofelia Higuera MD, 2 tablet at 03/10/22 2102     sertraline (ZOLOFT) tablet 25 mg, 25 mg, Oral, Daily, Ofelia Higuera MD, 25 mg at 03/11/22 0952     [COMPLETED] vancomycin (VANCOCIN) 1,000 mg in sodium chloride 0.9 % 250 mL intermittent infusion, 1,000 mg, Intravenous, Once, 1,000 mg at 03/10/22 1427 **FOLLOWED BY** vancomycin (VANCOCIN) 750 mg in sodium chloride 0.9 % 250 mL intermittent infusion, 750 mg, Intravenous, Q12H, Aarti Kim MD, 750 mg at 03/11/22 0208    SOCIAL HABITS:    History   Smoking Status     Never Smoker   Smokeless Tobacco     Not on file     Social History    Substance and Sexual Activity      Alcohol use: Not on file      History   Drug Use Not on file       FAMILY HISTORY:  History reviewed. No pertinent family history.    REVIEW OF SYSTEMS: Noted and reviewed.  Patient was on blood thinners prior to admission to the hospital.  These have been held.    PE:    /58 (BP Location: Left arm)   Pulse 95   Temp 97.4  F (36.3  C) (Oral)   Resp 20   Ht 1.651 m (5' 5\")   Wt 53.5 kg (118 lb)   SpO2 96%   BMI 19.64 kg/m      HEENT: Small laceration right portion of the top of the scalp and this is been stapled  Chest: Benign  Lungs: Decreased breath sounds left side  Heart: Rate approximately 80/min  Abd: Benign  Ext: Benign  Vascular: Normal carotids and femorals no bruit in the carotid abdomen without bruit    LABS:    Recent Labs   Lab 03/10/22  1431      CO2 24   BUN 10      Recent Labs   Lab 03/10/22  1431   WBC 15.8*   HGB 9.7*   HCT 33.7*   *    No results for input(s): ALKPHOS, BILITOT, ALT, AST in the last 168 hours.    Invalid input(s): BILIDIR, PROT, " LABALBU  No results for input(s): AMYLASE in the last 168 hours.  No results for input(s): INR in the last 168 hours.    XRAYS: CT and x-rays reviewed    ASSESSMENT: Left empyema    PLAN: With the patient's current clinical situation and his functional status I reviewed with his brother Gaurav that the best option under this circumstance will be to proceed with thoracoscopy possible thoracotomy depending upon the duration of the empyema.  Hopefully this will be accomplished with thoracoscopy with placement of 3 large chest tubes.  Potential thoracotomy reviewed with Gaurav as well.  Patient is not capable of understanding the current clinical situation.  Gaurav has consented to the surgical intervention.  We will proceed with surgery at this time.    Perry reveles md  [facility]

## 2022-03-11 NOTE — PROGRESS NOTES
Infectious Disease Progress Note    Assessment/Plan  Impression: Left pleural empyema with 4+ gram-positive cocci on Gram stain. Not growing aerobically--suspect anaerobe such as peptostreptococcus.     Patient has poor dentition and a recent fall and head injury, so suspect oral anaerobes.    Significant complex and loculated empyema on CT.  consult Dr Perry Martell for surgical evaluation.     Recommendations:  Unasyn and vancomycin seems appropriate.     Addendum:    Active Problems:    Pleural effusion    Fall    Fall, initial encounter    Traumatic head injury with multiple lacerations, initial encounter    Empyema of left pleural space (H)    Poor dentition      SHERYL BARTLETT MD  [phone]      Subjective  Not in significant distress.     Objective    Vital signs in last 24 hours  Temp:  [97.4  F (36.3  C)-97.7  F (36.5  C)] 97.4  F (36.3  C)  Pulse:  [80-95] 95  Resp:  [18-22] 20  BP: (102-115)/(57-62) 115/58  SpO2:  [96 %-98 %] 96 %  Wt Readings from Last 3 Encounters:   03/10/22 53.5 kg (118 lb)           Intake/Output last 3 shifts  I/O last 3 completed shifts:  In: 540 [P.O.:540]  Out: -   Intake/Output this shift:  No intake/output data recorded.    Review of Systems   Pertinent items are noted in HPI., otherwise negative.    Physical Exam  General appearance: alert, appears stated age and cooperative  Head: laceration noted on top of skull  Eyes: EOMI, no icterus  Mouth:  Poor dentition  Neck: no adenopathy and supple, symmetrical, trachea midline  Lungs: decreased breath sounds on L side of chest  Heart: RRR, no murmur  Abdomen: soft, non-tender; bowel sounds normal; no masses,  no organomegaly  Extremities: warm and dry  Skin: Skin color, texture, turgor normal. No rashes or lesions  Neurologic: Mental status: alert and cooperative.  somewhat repetitive       Pertinent Labs   Lab Results: personally reviewed.     Recent Labs   Lab 03/10/22  1431 03/09/22  0541   WBC 15.8* 10.2   HGB 9.7* 7.8*    HCT 33.7* 27.5*   * 437        Recent Labs   Lab 03/10/22  1431 03/09/22  0541    141   CO2 24 25   BUN 10 9     No results found for: CULT  Collected Updated Procedure Result Status    03/09/2022 1759 03/10/2022 1335 Pleural fluid Aerobic Bacterial Culture Routine with Gram Stain [[id number]]   (Abnormal)   Pleural fluid from Pleural Cavity, Left    Preliminary result Component Value   Culture No growth, less than 1 day P   Gram Stain Result 4+ Gram positive cocci Abnormal  P    4+ WBC seen Abnormal  P    Predominantly PMNs           10/16/2021 1011 03/08/2022 1032 COVID-19 VIRUS (CORONAVIRUS) BY PCR (EXTERNAL RESULT) Final result Component Value   No component results           03/22/2021 1056 03/08/2022 1032 COVID-19 VIRUS (CORONAVIRUS) BY PCR (EXTERNAL RESULT) [[id number]]    Swab    Final result Component Value   COVID-19 Virus by PCR (External Result) Not Detected          Pertinent Radiology   Radiology Results:   XR Chest 1 View    Result Date: 3/9/2022  EXAM: XR CHEST 1 VIEW LOCATION: [facility] DATE/TIME: 3/9/2022 2:25 PM INDICATION: Question pleural effusion. COMPARISON: None.     IMPRESSION: Heart and mediastinal size within normal limits. There is a moderate left pleural effusion. Associated compressive atelectasis is present at the left lung base with concurrent infectious process not excluded. The right lung is clear. There is  minimal amount of scarring versus atelectasis at the lateral right lung base. No pneumothorax.    US Thoracentesis    Result Date: 3/9/2022  EXAM: 1. LEFT THORACENTESIS 2. ULTRASOUND GUIDANCE LOCATION: [facility] DATE/TIME: 3/9/2022 5:05 PM INDICATION: Pleural effusion. PROCEDURE: Informed consent obtained. Time out performed. The chest was prepped and draped in sterile fashion. 5 mL of 1 % lidocaine was infused into the local soft tissues. Under direct ultrasound guidance, a 5 [place] catheter system was  placed into the  pleural effusion. 0.1 liters of foamy yellow material was removed and sent to lab. The foamy quality was very atypical. Patient tolerated procedure well. Ultrasound imaging was obtained and placed in the patient's permanent medical record.     IMPRESSION: Status post left ultrasound-guided thoracentesis. Reference CPT Code: 17349    CT Chest w/o Contrast    Result Date: 3/10/2022  EXAM: CT CHEST W/O CONTRAST LOCATION: [facility] DATE/TIME: 3/10/2022 2:07 PM INDICATION: Evaluate pleural effusion COMPARISON: Portable AP view of the chest 03/09/2022 TECHNIQUE: CT chest without IV contrast. Multiplanar reformats were obtained. Dose reduction techniques were used. CONTRAST: None. FINDINGS: LUNGS AND PLEURA: There is a loculated fluid collection in the posterior left pleural space with a circumscribed slightly higher attenuation rim that measures around 4 cm AP and is 16 cm craniocaudal. The loculated area has small foci of suspended air likely from the recent thoracentesis and indicates the fluid is complex. There is separate free fluid which layers into the left posterior costophrenic sulcus external to the focal loculation. Complete atelectasis of the left upper lobe along the mediastinal pleura. There is also multisegment atelectasis in the basilar segments of the left lower lobe. Within the atelectatic territories there are foci of high attenuation/calcification. There are bands of atelectasis in the right lower lobe, inferior right middle lobe, inferior right upper lobe. Adjacent to the atelectatic bands are also indistinct nodular opacities particularly in the middle and upper lobes, not well characterized due to motion-related blurring but likely reflecting a component of acute inflammation. The trachea and central airways are patent. No visible intraluminal material in the larger medium airways. MEDIASTINUM: Moderate hiatal hernia. Patulous esophagus which contains both  air and fluid to near the level of the thoracic inlet. Cardiac chambers are normal in size. No pericardial effusion. Normal caliber thoracic aorta. CORONARY ARTERY CALCIFICATION: None. UPPER ABDOMEN: There are peripherally calcified stones in the lumen of the gallbladder. No gallbladder wall thickening or pericholecystic edema. MUSCULOSKELETAL: Minimal thoracic degenerative osteophytes. No aggressive or destructive bone lesions.     IMPRESSION: 1.  [place] loculated pleural fluid collection in the posterior left chest with circumscribed surrounding peel consistent with empyema. Separate smaller amount of free fluid layering into the posterior sulcus. 2.  Lobar atelectasis of the left upper lobe compressed against the mediastinal pleura. Multisegment atelectasis in the basal left lower lobe. Consider surgical consultation given constellation of #1 and #2. 3.  Hiatal hernia and patulous esophagus, which can predispose to aspiration events. 4.  Nodular opacities associated with bands of atelectasis in the right middle and upper lobes and could relate to aspiration. 5.  Cholelithiasis.

## 2022-03-11 NOTE — PROGRESS NOTES
"Care Management Follow Up    Length of Stay (days): 3    Expected Discharge Date:  Pending     Concerns to be Addressed: pulm status, potential procedure-surgical consult      Patient plan of care discussed at interdisciplinary rounds: Yes    Anticipated Discharge Disposition:  TBD     Anticipated Discharge Services:  TBD  Anticipated Discharge DME:  walker       Additional Information:  Patient admitted for fall, head injury, left pleural empyema with 4+ gram-positive cocci. ID following. Surgery consult. History of intellectual disability and mental health-followed by [facility].      Recommendations is to return to group home with assist and home therapy.     Social History:  Patient lives at [facility]: [facility], independent w/showers and ambulatory; needs helps w/all other ADLs. Pt still works 4 days a week. Discharge planning w/house at [phone] to discuss transport, may need WC ride.    Pt has been declining, per Alan, caregiver at the house; normally does his own laundry and works, but they are having to help him more and more w/laundry and meals and medications. Pt is walking less and losing weight. To help pt calm now and distract him, he is offered pepsi, sandwich and a teddybear. Pt also likes looking at fish in a fish tank. Alan also indicates that pt like talking about his job and he calls Alan \"Mari\", and Alan is usually able to distract him by asking him if he wants to go to the hospital. Pt doesn't like being in the hospital.    Patient does not have a walker at the group home.    Patient's brother Gaurav states he is legal guardian. Paper work regarding legal guardian status requested.     [facility] has accepted for home care however they are booked out for nursing until end of next week. Accepted for RN/PT/OT.     Final discharge plan pending progression and recommendations.          Aleja Tang RN        "

## 2022-03-11 NOTE — PROGRESS NOTES
Paged  regarding air leak   Paged again at 1800-no call back  [phone] house officer - told to call intensivist  [phone] Dr. Martell called back

## 2022-03-11 NOTE — PLAN OF CARE
Problem: Plan of Care - These are the overarching goals to be used throughout the patient stay.    Goal: Absence of Hospital-Acquired Illness or Injury  Outcome: Ongoing, Progressing  Intervention: Identify and Manage Fall Risk  Recent Flowsheet Documentation  Taken 3/10/2022 1646 by Adithya Ross RN  Safety Promotion/Fall Prevention:   activity supervised   bed alarm on   chair alarm on   lighting adjusted   nonskid shoes/slippers when out of bed   patient and family education   room door open   safety round/check completed   supervised activity     Problem: Risk for Delirium  Goal: Improved Behavioral Control  Outcome: Ongoing, Progressing     Problem: Risk for Delirium  Goal: Improved Attention and Thought Clarity  Outcome: Ongoing, Progressing     Problem: Fall Injury Risk  Goal: Absence of Fall and Fall-Related Injury  Outcome: Ongoing, Progressing  Intervention: Identify and Manage Contributors  Recent Flowsheet Documentation  Taken 3/10/2022 1646 by Adithya Ross RN  Medication Review/Management: medications reviewed  Intervention: Promote Injury-Free Environment  Recent Flowsheet Documentation  Taken 3/10/2022 1646 by Adithya Ross RN  Safety Promotion/Fall Prevention:   activity supervised   bed alarm on   chair alarm on   lighting adjusted   nonskid shoes/slippers when out of bed   patient and family education   room door open   safety round/check completed   supervised activity     Problem: Pain Acute  Goal: Acceptable Pain Control and Functional Ability  Outcome: Ongoing, Progressing  Intervention: Prevent or Manage Pain  Recent Flowsheet Documentation  Taken 3/10/2022 1646 by Adithya Ross RN  Medication Review/Management: medications reviewed     Problem: Infection  Goal: Absence of Infection Signs and Symptoms  Outcome: Ongoing, Progressing     Pt alert and oriented to self and location. Dx: Pleural effusion s/p left thoracentesis and as well as pleural empyema. Pt on IV ampicillin-sulbactam Q 6  hrs and IV vancomycin Q 12 hrs. LS diminished. Pt also sustained a fall w/ head laceration at his group home and currently has 4 staples on the top of his head, which is open to air. Site has scab/dried drainage, but no active drainage at this time. Pt SBA w/ transfers. Chair and bed alarm on for pt safety. Pt calm and cooperative at the beginning of the shift, but became frustrated at ~1900. Pt reporting that he wants to go home. Pt educated/reminded of current plan, which was not effective in calming pt down. Pt did head to bed at 2000 and is currently in bed sleeping at this time. K+ 2.9 was notified to Dr. Talavera. Pt does not have K+ protocol at this time. Pt is asymptomatic. HR 80 w/ regular rhythm. New order placed for STAT K+ lab and K+ protocol for replacement. NOC nurse notified of new orders.

## 2022-03-11 NOTE — PLAN OF CARE
Problem: Plan of Care - These are the overarching goals to be used throughout the patient stay.    Goal: Absence of Hospital-Acquired Illness or Injury  Intervention: Identify and Manage Fall Risk  Recent Flowsheet Documentation  Taken 3/11/2022 0020 by Maliha Copeland, RN  Safety Promotion/Fall Prevention:   activity supervised   bed alarm on   toileting scheduled   room door open   nonskid shoes/slippers when out of bed   mobility aid in reach     Problem: Risk for Delirium  Goal: Improved Sleep  Outcome: Ongoing, Progressing    Problem: Risk for Delirium  Goal: Optimal Coping  Outcome: Ongoing, Progressing       Patient alert and orientated to self only. Pleasant. Denies pain. Slept majority of shift. Potassium protocol addressed. Sepsis protocol initiated, Lactic Acid returned at 1.0. On IV antibiotics. Intermittent incontinence. Bed alarm and chair alarm in use.     Maliha Copeland, RN

## 2022-03-11 NOTE — ANESTHESIA CARE TRANSFER NOTE
Patient: Perry Betancourt    Procedure: Procedure(s):  LEFT THORACOSCOPY, CONVERTED TO  LEFT THORACOTOMY WITH DECORTICATION       Diagnosis: Empyema of left pleural space (H) [J86.9]  Diagnosis Additional Information: No value filed.    Anesthesia Type:   General     Note:    Oropharynx: endotracheal tube in place  Level of Consciousness: drowsy    Level of Supplemental Oxygen (L/min / FiO2): 15  Independent Airway: airway patency not satisfactory and stable  Dentition: dentition unchanged  Vital Signs Stable: post-procedure vital signs reviewed and stable    Patient transferred to: PACU  Comments: Pt transported to PACU intubated via DLAURELIA VSS. Placed on vent in PACU 10/5 RR low 20s. MDA in PACU w/ pt.   Handoff Report: Identifed the Patient, Identified the Reponsible Provider, Reviewed the pertinent medical history, Discussed the surgical course, Reviewed Intra-OP anesthesia mangement and issues during anesthesia, Set expectations for post-procedure period and Allowed opportunity for questions and acknowledgement of understanding      Vitals:  Vitals Value Taken Time   BP 91/52 03/11/22 1520   Temp     Pulse 92 03/11/22 1525   Resp 21 03/11/22 1525   SpO2 100 % 03/11/22 1525   Vitals shown include unvalidated device data.    Electronically Signed By: ALLEN Petty CRNA  March 11, 2022  3:27 PM   100

## 2022-03-11 NOTE — PROGRESS NOTES
[facility]    Medicine Progress Note - Hospitalist Service    Date of Admission:  3/8/2022    Assessment & Plan           Assessment & Plan     Perry Betancourt is a 76 year old male, from group home with history of hypertension and mood disorder admitted on 3/8/2022 due to mechanical fall and head laceration. CT C-spine showed pleural fluid filling the left lung apex. Chest x-ray reportedly showed moderate to marked left sided pleural fluid collection.     Discussed with Erin and patient's Guardian, Gaurav ([phone]); both agreed with plan to proceed with left thoracentesis. Left pleural fluid culture grew Eikenella corodens; likely related to poor dentition. ID recommended Unasyn and vancomycin as well as chest CT for possible residual effusion or loculation.    Chest CT showed oblong loculated pleural fluid collection in the posterior left chest with circumscribed surrounding peel consistent with empyema, separate smaller amount of free fluid layering into the posterior sulcus, Lobar atelectasis of the left upper lobe compressed against the mediastinal pleura, Hiatal hernia and patulous esophagus, which can predispose to aspiration events as well as nodular opacities associated with bands of atelectasis in the right middle and upper lobes and could relate to aspiration.  Cardiothoracic surgeon plans to perform thoracoscopy today 3/11/2022.          Status post mechanical fall  Empyema of left pleural space    Left pleural fluid culture grew Eikenella corodens; likely related to poor dentition.   Chest CT 3/10/2022 showed oblong loculated pleural fluid collection in the posterior left chest with circumscribed surrounding peel consistent with empyema, separate smaller amount of free fluid layering into the posterior sulcus, Lobar atelectasis of the left upper lobe compressed against the mediastinal pleura, hiatal hernia and patulous esophagus, which can predispose to aspiration events as  well as nodular opacities associated with bands of atelectasis in the right middle and upper lobes and could relate to aspiration.      -Breathing comfortably on room air   -Thoracoscopy today as per cardiothoracic surgeon   -Continue Unasyn and vancomycin as per ID - started 3/10/22  -Cardiothoracic surgery [id number] assistance  -ID follow up - appreciate assistance     Head injury secondary to fall  -Patient presented with mechanical fall from his group home.   CT scan of the head showed questionable subdural hygroma measuring up to 0.9 cm  versus prominence of subarachnoid space overlying the left parietal lobe.  CT C-spine  showed no acute fractures.   -Laceration on his head has been repaired in the ED.  -Fall precaution          Hypertension-stable  -Continue PTA amlodipine, atenolol     Mood disorder  -Continue PTA sertraline, ativan     Diet: Regular  DVT Prophylaxis: Pneumatic Compression Devices  Terry Catheter: Not present  Central Lines: None  Code Status: Full code         Disposition Plan     Expected Discharge: Few days   Anticipated discharge location:  Awaiting care coordination huddle  Barriers to discharge: antibiotic therapy, thoracoscopy/surgery recommendation, ID recommendations.        Diet: NPO per Anesthesia Guidelines for Procedure/Surgery Except for: Meds    DVT Prophylaxis: Pneumatic Compression Devices  Terry Catheter: Not present  Central Lines: None  Cardiac Monitoring: None  Code Status: Full Code      Disposition Plan   Expected Discharge: 03/14/2022     Anticipated discharge location:  Awaiting care coordination huddle  Delays: { TIP  Update expected discharge date and delays and refresh note (tipsheet)     :[id number]}    placement, thoracentesis for pleural effusion with pleural fluid analysis       The patient's care was discussed with the Bedside Nurse and Patient.  I also discussed with patient Guardian, Gaurav on phone    Aarti Kim MD  Hospitalist Service  [facility]  [facility]  Securely message with the [employer] Web Console (learn more here)  Text page via [employer] Paging/Directory         Clinically Significant Risk Factors Present on Admission                  ______________________________________________________________________    Interval History   No new complaints and no acute events overnight. He denies chest pain, fever, cough or sob.  Patient is confused but able to engage in conversation.      Data reviewed today: I reviewed all medications, new labs and imaging results over the last 24 hours. I personally reviewed no images or EKG's today.    Physical Exam   Vital Signs: Temp: 97.4  F (36.3  C) Temp src: Oral BP: 115/58 Pulse: 95   Resp: 20 SpO2: 96 % O2 Device: None (Room air)    Weight: 118 lbs 0 oz  Constitutional: awake, alert, cooperative, no apparent distress, frail, and appears stated age, hard of hearing  Head: wound dressing in the right parietal region - clean and dry    Mouth: Poor dentition+   Respiratory: Diminished air entry bilaterally   Cardiovascular: Normal apical impulse, regular rate and rhythm, normal S1 and S2, no S3 or S4, and no murmur noted  GI: No scars, normal bowel sounds, soft, non-distended, non-tender, no masses palpated, no hepatosplenomegally  Musculoskeletal: There is no redness, warmth, or swelling of the joints.  Full range of motion noted.  Motor strength is 5 out of 5 all extremities bilaterally.  Tone is normal.  Neurologic: Awake, alert, oriented to name, place and time.  Motor is 5 out of 5 bilaterally.  Follows simple command.    Data   Recent Labs   Lab 03/11/22  0558 03/11/22  0018 03/10/22  1431 03/09/22  0921 03/09/22  0541   WBC  --   --  15.8*  --  10.2   HGB  --   --  9.7*  --  7.8*   MCV  --   --  82  --  83   PLT  --   --  600*  --  437   NA  --   --  139  --  141   POTASSIUM 3.5 2.8* 2.9*  --  3.5   CHLORIDE  --   --  104  --  108*   CO2  --   --  24  --  25   BUN  --   --  10  --  9   CR 0.63*   --  0.68*  --  0.64*   ANIONGAP  --   --  11  --  8   CRYSTAL  --   --  8.0*  --  7.9*   GLC  --   --  138*  --  109   PROTTOTAL  --   --   --  7.3  --

## 2022-03-11 NOTE — PROVIDER NOTIFICATION
Dr. Mohan Talavera notified about pt's K+ of 2.9. Pt asymptomatic at this time. HR 80 w/ regular rhythm. New order for potassium lab now and potassium protocol placed.

## 2022-03-12 ENCOUNTER — APPOINTMENT (OUTPATIENT)
Dept: RADIOLOGY | Facility: HOSPITAL | Age: 77
DRG: 163 | End: 2022-03-12
Attending: SURGERY
Payer: MEDICARE

## 2022-03-12 LAB
ANION GAP SERPL CALCULATED.3IONS-SCNC: 11 MMOL/L (ref 5–18)
BUN SERPL-MCNC: 8 MG/DL (ref 8–28)
CALCIUM SERPL-MCNC: 7.2 MG/DL (ref 8.5–10.5)
CHLORIDE BLD-SCNC: 106 MMOL/L (ref 98–107)
CO2 SERPL-SCNC: 20 MMOL/L (ref 22–31)
CREAT SERPL-MCNC: 0.73 MG/DL (ref 0.7–1.3)
ERYTHROCYTE [DISTWIDTH] IN BLOOD BY AUTOMATED COUNT: 17 % (ref 10–15)
GFR SERPL CREATININE-BSD FRML MDRD: >90 ML/MIN/1.73M2
GLUCOSE BLD-MCNC: 300 MG/DL (ref 70–125)
HCT VFR BLD AUTO: 29.5 % (ref 40–53)
HGB BLD-MCNC: 8.6 G/DL (ref 13.3–17.7)
MCH RBC QN AUTO: 24.4 PG (ref 26.5–33)
MCHC RBC AUTO-ENTMCNC: 29.2 G/DL (ref 31.5–36.5)
MCV RBC AUTO: 84 FL (ref 78–100)
PLATELET # BLD AUTO: 574 10E3/UL (ref 150–450)
POTASSIUM BLD-SCNC: 3.7 MMOL/L (ref 3.5–5)
RADIOLOGIST FLAGS: ABNORMAL
RBC # BLD AUTO: 3.53 10E6/UL (ref 4.4–5.9)
SODIUM SERPL-SCNC: 137 MMOL/L (ref 136–145)
VANCOMYCIN SERPL-MCNC: 11 MG/L
WBC # BLD AUTO: 18.1 10E3/UL (ref 4–11)

## 2022-03-12 PROCEDURE — 250N000011 HC RX IP 250 OP 636: Performed by: INTERNAL MEDICINE

## 2022-03-12 PROCEDURE — 250N000013 HC RX MED GY IP 250 OP 250 PS 637: Performed by: HOSPITALIST

## 2022-03-12 PROCEDURE — 250N000011 HC RX IP 250 OP 636

## 2022-03-12 PROCEDURE — 82310 ASSAY OF CALCIUM: CPT | Performed by: SURGERY

## 2022-03-12 PROCEDURE — 36415 COLL VENOUS BLD VENIPUNCTURE: CPT | Performed by: SURGERY

## 2022-03-12 PROCEDURE — 80202 ASSAY OF VANCOMYCIN: CPT | Performed by: INTERNAL MEDICINE

## 2022-03-12 PROCEDURE — 99232 SBSQ HOSP IP/OBS MODERATE 35: CPT | Performed by: INTERNAL MEDICINE

## 2022-03-12 PROCEDURE — 99233 SBSQ HOSP IP/OBS HIGH 50: CPT | Performed by: INTERNAL MEDICINE

## 2022-03-12 PROCEDURE — 120N000001 HC R&B MED SURG/OB

## 2022-03-12 PROCEDURE — 71045 X-RAY EXAM CHEST 1 VIEW: CPT

## 2022-03-12 PROCEDURE — 85027 COMPLETE CBC AUTOMATED: CPT | Performed by: SURGERY

## 2022-03-12 PROCEDURE — 258N000001 HC RX 258: Performed by: SURGERY

## 2022-03-12 PROCEDURE — 258N000003 HC RX IP 258 OP 636: Performed by: INTERNAL MEDICINE

## 2022-03-12 RX ADMIN — AMPICILLIN SODIUM AND SULBACTAM SODIUM 3 G: 2; 1 INJECTION, POWDER, FOR SOLUTION INTRAMUSCULAR; INTRAVENOUS at 13:14

## 2022-03-12 RX ADMIN — DOCUSATE SODIUM 50 MG AND SENNOSIDES 8.6 MG 1 TABLET: 8.6; 5 TABLET, FILM COATED ORAL at 10:06

## 2022-03-12 RX ADMIN — LORAZEPAM 2 MG: 1 TABLET ORAL at 16:00

## 2022-03-12 RX ADMIN — AMPICILLIN SODIUM AND SULBACTAM SODIUM 3 G: 2; 1 INJECTION, POWDER, FOR SOLUTION INTRAMUSCULAR; INTRAVENOUS at 05:36

## 2022-03-12 RX ADMIN — AMPICILLIN SODIUM AND SULBACTAM SODIUM 3 G: 2; 1 INJECTION, POWDER, FOR SOLUTION INTRAMUSCULAR; INTRAVENOUS at 00:17

## 2022-03-12 RX ADMIN — ACETAMINOPHEN 975 MG: 325 TABLET ORAL at 05:36

## 2022-03-12 RX ADMIN — VANCOMYCIN HYDROCHLORIDE 750 MG: 1 INJECTION, POWDER, LYOPHILIZED, FOR SOLUTION INTRAVENOUS at 20:23

## 2022-03-12 RX ADMIN — DEXTROSE AND SODIUM CHLORIDE: 5; 450 INJECTION, SOLUTION INTRAVENOUS at 16:00

## 2022-03-12 RX ADMIN — DOCUSATE SODIUM 50 MG AND SENNOSIDES 8.6 MG 1 TABLET: 8.6; 5 TABLET, FILM COATED ORAL at 20:22

## 2022-03-12 RX ADMIN — AMPICILLIN SODIUM AND SULBACTAM SODIUM 3 G: 2; 1 INJECTION, POWDER, FOR SOLUTION INTRAMUSCULAR; INTRAVENOUS at 18:27

## 2022-03-12 RX ADMIN — ACETAMINOPHEN 975 MG: 325 TABLET ORAL at 12:35

## 2022-03-12 RX ADMIN — VANCOMYCIN HYDROCHLORIDE 750 MG: 1 INJECTION, POWDER, LYOPHILIZED, FOR SOLUTION INTRAVENOUS at 10:08

## 2022-03-12 RX ADMIN — ACETAMINOPHEN 975 MG: 325 TABLET ORAL at 20:22

## 2022-03-12 RX ADMIN — SERTRALINE HYDROCHLORIDE 25 MG: 25 TABLET ORAL at 10:07

## 2022-03-12 RX ADMIN — DEXTROSE AND SODIUM CHLORIDE: 5; 450 INJECTION, SOLUTION INTRAVENOUS at 00:59

## 2022-03-12 ASSESSMENT — ACTIVITIES OF DAILY LIVING (ADL)
ADLS_ACUITY_SCORE: 15
ADLS_ACUITY_SCORE: 16
ADLS_ACUITY_SCORE: 15
ADLS_ACUITY_SCORE: 16
ADLS_ACUITY_SCORE: 15
ADLS_ACUITY_SCORE: 16
ADLS_ACUITY_SCORE: 16
ADLS_ACUITY_SCORE: 15
ADLS_ACUITY_SCORE: 16
ADLS_ACUITY_SCORE: 16
ADLS_ACUITY_SCORE: 15
ADLS_ACUITY_SCORE: 16
ADLS_ACUITY_SCORE: 15
ADLS_ACUITY_SCORE: 16
ADLS_ACUITY_SCORE: 15
ADLS_ACUITY_SCORE: 16
ADLS_ACUITY_SCORE: 15
ADLS_ACUITY_SCORE: 16

## 2022-03-12 NOTE — PROGRESS NOTES
Infectious Disease Progress Note    Assessment/Plan  Impression: Left pleural empyema with Eikenella and possibly also some streptococcoid organism(s), not totally final yet.      Patient has poor dentition and a recent fall and head injury, so suspect oral anaerobes.    Significant complex and loculated empyema on CT.  consult Dr Perry Martell for surgical evaluation.    PROCEDURE DATE:  3/8/2022 - 3/11/2022     PREOPERATIVE DIAGNOSIS: Left sided thoracic empyema with pneumonia     POSTOPERATIVE DIAGNOSIS: Same with marked thickening of the parietal pleura and approximately 1 to 2 cm peel around the majority of the left thorax and left upper and lower lobes    PROCEDURE: 1.  Thoracoscopy with lavage and cleanout left lower lobe and left upper lobe 2.  Left thoracoscopy with initial dissection with need for conversion to thoracotomy for longstanding marked empyema and thick peel 3.  Left thoracotomy complete decortication and complete parietal pleurectomy placement of 336 [place]an chest tubes     Recommendations:  Unasyn and vancomycin seems appropriate.    Drop vanco likely monday     Addendum:    Active Problems:    Pleural effusion    Fall    Fall, initial encounter    Traumatic head injury with multiple lacerations, initial encounter    Empyema of left pleural space (H)    Poor dentition      DARCI Perez MD    Subjective  Not in significant distress. Afeb.  Reviewed the micro.  Chest wound ok (tubes in).  He is conversant and pleasant.     Objective    Vital signs in last 24 hours  Temp:  [96.1  F (35.6  C)-98  F (36.7  C)] 97.7  F (36.5  C)  Pulse:  [] 93  Resp:  [18-36] 18  BP: ()/(43-65) 123/58  FiO2 (%):  [4 %] 4 %  SpO2:  [93 %-100 %] 100 %  Wt Readings from Last 3 Encounters:   03/10/22 53.5 kg (118 lb)           Intake/Output last 3 shifts  I/O last 3 completed shifts:  In: 3530.5 [I.V.:2955.5]  Out: 1440 [Urine:495; Chest Tube:945]  Intake/Output this shift:  No intake/output data  recorded.    Review of Systems   Pertinent items are noted in HPI., otherwise negative.    Physical Exam  General appearance: alert, appears stated age and cooperative  Head: laceration noted on top of skull  Eyes: EOMI, no icterus  Mouth:  Poor dentition  Neck: no adenopathy and supple, symmetrical, trachea midline  Lungs: decreased breath sounds on L side of chest  Heart: RRR, no murmur  Abdomen: soft, non-tender; bowel sounds normal; no masses,  no organomegaly  Extremities: warm and dry  Skin: Skin color, texture, turgor normal. No rashes or lesions  Neurologic: Mental status: alert and cooperative.  somewhat repetitive       Pertinent Labs   Lab Results: personally reviewed.     Recent Labs   Lab 03/12/22  0534 03/11/22  2015 03/10/22  1431   WBC 18.1* 23.7* 15.8*   HGB 8.6* 7.2* 9.7*   HCT 29.5* 25.3* 33.7*   * 541* 600*        Recent Labs   Lab 03/12/22  0534 03/10/22  1431 03/09/22  0541    139 141   CO2 20* 24 25   BUN 8 10 9     No results found for: CULT  Collected Updated Procedure Result Status    03/09/2022 1759 03/10/2022 1335 Pleural fluid Aerobic Bacterial Culture Routine with Gram Stain [[id number]]   (Abnormal)   Pleural fluid from Pleural Cavity, Left    Preliminary result Component Value   Culture No growth, less than 1 day P   Gram Stain Result 4+ Gram positive cocci Abnormal  P    4+ WBC seen Abnormal  P    Predominantly PMNs           10/16/2021 1011 03/08/2022 1032 COVID-19 VIRUS (CORONAVIRUS) BY PCR (EXTERNAL RESULT) Final result Component Value   No component results           03/22/2021 1056 03/08/2022 1032 COVID-19 VIRUS (CORONAVIRUS) BY PCR (EXTERNAL RESULT) [[id number]]    Swab    Final result Component Value   COVID-19 Virus by PCR (External Result) Not Detected          Pertinent Radiology   Radiology Results:   XR Chest 1 View    Result Date: 3/9/2022  EXAM: XR CHEST 1 VIEW LOCATION: [facility] DATE/TIME: 3/9/2022 2:25 PM INDICATION:  Question pleural effusion. COMPARISON: None.     IMPRESSION: Heart and mediastinal size within normal limits. There is a moderate left pleural effusion. Associated compressive atelectasis is present at the left lung base with concurrent infectious process not excluded. The right lung is clear. There is  minimal amount of scarring versus atelectasis at the lateral right lung base. No pneumothorax.    US Thoracentesis    Result Date: 3/9/2022  EXAM: 1. LEFT THORACENTESIS 2. ULTRASOUND GUIDANCE LOCATION: [facility] DATE/TIME: 3/9/2022 5:05 PM INDICATION: Pleural effusion. PROCEDURE: Informed consent obtained. Time out performed. The chest was prepped and draped in sterile fashion. 5 mL of 1 % lidocaine was infused into the local soft tissues. Under direct ultrasound guidance, a 5 Sinhala catheter system was placed into the  pleural effusion. 0.1 liters of foamy yellow material was removed and sent to lab. The foamy quality was very atypical. Patient tolerated procedure well. Ultrasound imaging was obtained and placed in the patient's permanent medical record.     IMPRESSION: Status post left ultrasound-guided thoracentesis. Reference CPT Code: 61885    XR Chest Port 1 View    Result Date: 3/12/2022  EXAM: XR CHEST PORT 1 VIEW LOCATION: [facility] DATE/TIME: 3/12/2022 5:56 AM INDICATION: Follow-up chest tube placement and pneumothorax. COMPARISON: Chest radiograph 03/09/2022, ultrasound-guided thoracentesis 03/09/2022, CT 03/10/2022     IMPRESSION: New minimal right apical pneumothorax. Separation of visceral and parietal pleura by 1.5 cm. Minimal right basilar pleural fluid with atelectasis. 3 left-sided chest tubes in place. No definitive evidence for left-sided pneumothorax. Marked subcutaneous emphysema left lateral chest wall. Atelectasis or infiltrate left lung base. Mild cardiac enlargement. Normal pulmonary vascularity. [Critical Result: New pneumothorax]  Patient's nurse was contacted by me on 3/12/2022 7:00 AM and verbalized understanding of the critical result.     CT Chest w/o Contrast    Result Date: 3/10/2022  EXAM: CT CHEST W/O CONTRAST LOCATION: [facility] DATE/TIME: 3/10/2022 2:07 PM INDICATION: Evaluate pleural effusion COMPARISON: Portable AP view of the chest 03/09/2022 TECHNIQUE: CT chest without IV contrast. Multiplanar reformats were obtained. Dose reduction techniques were used. CONTRAST: None. FINDINGS: LUNGS AND PLEURA: There is a loculated fluid collection in the posterior left pleural space with a circumscribed slightly higher attenuation rim that measures around 4 cm AP and is 16 cm craniocaudal. The loculated area has small foci of suspended air likely from the recent thoracentesis and indicates the fluid is complex. There is separate free fluid which layers into the left posterior costophrenic sulcus external to the focal loculation. Complete atelectasis of the left upper lobe along the mediastinal pleura. There is also multisegment atelectasis in the basilar segments of the left lower lobe. Within the atelectatic territories there are foci of high attenuation/calcification. There are bands of atelectasis in the right lower lobe, inferior right middle lobe, inferior right upper lobe. Adjacent to the atelectatic bands are also indistinct nodular opacities particularly in the middle and upper lobes, not well characterized due to motion-related blurring but likely reflecting a component of acute inflammation. The trachea and central airways are patent. No visible intraluminal material in the larger medium airways. MEDIASTINUM: Moderate hiatal hernia. Patulous esophagus which contains both air and fluid to near the level of the thoracic inlet. Cardiac chambers are normal in size. No pericardial effusion. Normal caliber thoracic aorta. CORONARY ARTERY CALCIFICATION: None. UPPER ABDOMEN: There are peripherally calcified stones  in the lumen of the gallbladder. No gallbladder wall thickening or pericholecystic edema. MUSCULOSKELETAL: Minimal thoracic degenerative osteophytes. No aggressive or destructive bone lesions.     IMPRESSION: 1.  [place] loculated pleural fluid collection in the posterior left chest with circumscribed surrounding peel consistent with empyema. Separate smaller amount of free fluid layering into the posterior sulcus. 2.  Lobar atelectasis of the left upper lobe compressed against the mediastinal pleura. Multisegment atelectasis in the basal left lower lobe. Consider surgical consultation given constellation of #1 and #2. 3.  Hiatal hernia and patulous esophagus, which can predispose to aspiration events. 4.  Nodular opacities associated with bands of atelectasis in the right middle and upper lobes and could relate to aspiration. 5.  Cholelithiasis.

## 2022-03-12 NOTE — PLAN OF CARE
"  Problem: Plan of Care - These are the overarching goals to be used throughout the patient stay.    Goal: Plan of Care Review/Shift Note  Description: The Plan of Care Review/Shift note should be completed every shift.  The Outcome Evaluation is a brief statement about your assessment that the patient is improving, declining, or no change.  This information will be displayed automatically on your shift note.  Outcome: Ongoing, Progressing  Flowsheets (Taken 3/12/2022 1104)  Plan of Care Reviewed With: patient  Goal: Patient-Specific Goal (Individualized)  Description: You can add care plan individualizations to a care plan. Examples of Individualization might be:  \"Parent requests to be called daily at 9am for status\", \"I have a hard time hearing out of my right ear\", or \"Do not touch me to wake me up as it startles me\".  Outcome: Ongoing, Progressing  Goal: Absence of Hospital-Acquired Illness or Injury  Outcome: Ongoing, Progressing  Intervention: Identify and Manage Fall Risk  Recent Flowsheet Documentation  Taken 3/12/2022 0814 by Addison Garzon RN  Safety Promotion/Fall Prevention: activity supervised  Goal: Readiness for Transition of Care  Outcome: Ongoing, Progressing     Problem: Risk for Delirium  Goal: Improved Attention and Thought Clarity  Outcome: Ongoing, Progressing     Problem: Fall Injury Risk  Goal: Absence of Fall and Fall-Related Injury  Outcome: Ongoing, Progressing  Intervention: Identify and Manage Contributors  Recent Flowsheet Documentation  Taken 3/12/2022 0814 by Addison Garzon RN  Medication Review/Management: medications reviewed  Intervention: Promote Injury-Free Environment  Recent Flowsheet Documentation  Taken 3/12/2022 0814 by Addison Garzon RN  Safety Promotion/Fall Prevention: activity supervised     Problem: Pain Acute  Goal: Acceptable Pain Control and Functional Ability  Outcome: Ongoing, Progressing  Intervention: Prevent or Manage Pain  Recent Flowsheet Documentation  Taken " 3/12/2022 0814 by Addison Garzon RN  Medication Review/Management: medications reviewed     Problem: Infection  Goal: Absence of Infection Signs and Symptoms  Outcome: Ongoing, Progressing     Problem: Urinary Elimination Management  Goal: Effective Urinary Elimination/Continence  Outcome: Ongoing, Progressing   Goal Outcome Evaluation:    Plan of Care Reviewed With: patient

## 2022-03-12 NOTE — PROGRESS NOTES
[facility]    Medicine Progress Note - Hospitalist Service    Date of Admission:  3/8/2022    Assessment & Plan           Assessment & Plan     Perry Betancourt is a 76 year old male from group home with history of hypertension, Intellectual disability, hypertension, OCD, anxiety disorder and mood disorder admitted on 3/8/2022 due to mechanical fall and head laceration. CT C-spine showed pleural fluid filling the left lung apex. Chest x-ray  showed moderate to marked left sided pleural fluid collection.     Left pleural fluid analysis showed empyema with 4+ gram-positive cocci on gram stain; possibly due to oral anaerobes as per ID given poor dentition and recent fall with head injury. ID recommended Unasyn and vancomycin as well as chest CT for possible residual effusion or loculation. CT chest showed oblong loculated pleural fluid collection in the posterior left chest with circumscribed surrounding peel consistent with empyema    On 3/11/22, CTS performed thoracoscopy with lavage and cleanout left lower lobe and left upper lobe, Left thoracoscopy with conversion to thoracotomy due to longstanding marked empyema requiring complete decortication and complete parietal pleurectomy and placement of chest tubes.      Status post mechanical fall  Empyema of left pleural space    On 3/11/22, CTS performed thoracoscopy with lavage and cleanout left lower lobe and left upper lobe, Left thoracoscopy with conversion to thoracotomy due to longstanding marked empyema requiring complete decortication and complete parietal pleurectomy and placement of chest tubes.   -Breathing comfortably on room air   -Left pleural fluid analysis showed empyema with 4+ gram-positive cocci on gram stain; possibly due to oral anaerobes as per ID given poor dentition and recent fall with head injury.    - Unasyn and vancomycin as per ID - started 3/10/22  -Chest tube management as per surgery service   -ID follow up -  appreciate assistance     Head injury secondary to fall  -Patient presented with mechanical fall from his group home.   CT scan of the head showed questionable subdural hygroma measuring up to 0.9 cm  versus prominence of subarachnoid space overlying the left parietal lobe.  CT C-spine  showed no acute fractures.   -Laceration on his head has been repaired in the ED.  -Fall precaution          Hypertension-stable  -Continue PTA amlodipine, atenolol     Mood disorder  -Continue PTA sertraline, ativan     Diet: Regular  DVT Prophylaxis: Pneumatic Compression Devices  Terry Catheter: Not present  Central Lines: None  Code Status: Full code         Disposition Plan     Expected Discharge: Few days   Anticipated discharge location:  Awaiting care coordination huddle  Barriers to discharge: antibiotic therapy, pleural fluid culture, chest tube management, surgery and ID recommendations.        Diet: Full Liquid Diet    DVT Prophylaxis: Pneumatic Compression Devices  Terry Catheter: Not present  Central Lines: None  Cardiac Monitoring: None  Code Status: Full Code      Disposition Plan   Expected Discharge: 03/14/2022     Anticipated discharge location:  Awaiting care coordination huddle  Delays:     placement, thoracentesis for pleural effusion with pleural fluid analysis       The patient's care was discussed with the Bedside Nurse and Patient.     Aarti Kim MD  Hospitalist Service  [facility]  Securely message with the [employer] Web Console (learn more here)  Text page via [employer] Paging/Directory         Clinically Significant Risk Factors Present on Admission                  ______________________________________________________________________    Interval History   No new complaints. He denies chest pain, fever, cough or sob. Pain is well controlled post op.     Data reviewed today: I reviewed all medications, new labs and imaging results over the last 24 hours. I personally reviewed no  images or EKG's today.    Physical Exam   Vital Signs: Temp: 97.7  F (36.5  C) Temp src: Oral BP: 123/58 Pulse: 93   Resp: 18 SpO2: 100 % O2 Device: Nasal cannula Oxygen Delivery: 1 LPM  Weight: 118 lbs 0 oz  Constitutional: awake, alert, cooperative, no apparent distress, frail, and appears stated age, hard of hearing  Head: wound dressing in the right parietal region - clean and dry    Mouth: Poor dentition+   Respiratory: Diminished air entry bilaterally   Cardiovascular: Normal apical impulse, regular rate and rhythm, normal S1 and S2, no S3 or S4, and no murmur noted  Chest: Chest tubes in place draining serosanguinous fluid.   GI: No scars, normal bowel sounds, soft, non-distended, non-tender, no masses palpated, no hepatosplenomegally  Musculoskeletal: There is no redness, warmth, or swelling of the joints.  Full range of motion noted.  Motor strength is 5 out of 5 all extremities bilaterally.    Neurologic: Awake, alert, oriented to name, place and time.  Motor is 5 out of 5 bilaterally.     Data   Recent Labs   Lab 03/12/22  0534 03/11/22 2015 03/11/22  0558 03/11/22  0018 03/10/22  1431 03/09/22  0921 03/09/22  0541   WBC 18.1* 23.7*  --   --  15.8*  --  10.2   HGB 8.6* 7.2*  --   --  9.7*  --  7.8*   MCV 84 83  --   --  82  --  83   * 541*  --   --  600*  --  437     --   --   --  139  --  141   POTASSIUM 3.7  --  3.5 2.8* 2.9*  --  3.5   CHLORIDE 106  --   --   --  104  --  108*   CO2 20*  --   --   --  24  --  25   BUN 8  --   --   --  10  --  9   CR 0.73  --  0.63*  --  0.68*  --  0.64*   ANIONGAP 11  --   --   --  11  --  8   CRYSTAL 7.2*  --   --   --  8.0*  --  7.9*   *  --   --   --  138*  --  109   PROTTOTAL  --   --   --   --   --  7.3  --

## 2022-03-12 NOTE — PROGRESS NOTES
Progress Note    Assessment/Plan  Patient very comfortable.  We will leave all chest tubes in today.  Air leak noted and expected and has been there since surgery.  Will review chest x-ray    Active Problems:    Pleural effusion    Fall    Fall, initial encounter    Traumatic head injury with multiple lacerations, initial encounter    Empyema of left pleural space (H)    Poor dentition      Subjective  As noted very comfortable  Objective    Vital signs in last 24 hours  Temp:  [96.1  F (35.6  C)-98.9  F (37.2  C)] 97.4  F (36.3  C)  Pulse:  [] 95  Resp:  [16-36] 18  BP: ()/(43-70) 127/63  FiO2 (%):  [4 %] 4 %  SpO2:  [93 %-100 %] 99 %  Weight:   [unfilled]    Intake/Output last 3 shifts  I/O last 3 completed shifts:  In: 3530.5 [I.V.:2955.5]  Out: 1440 [Urine:495; Chest Tube:945]  Intake/Output this shift:  No intake/output data recorded.      Physical Exam  Good respiratory effort.  Chest tube output small.  Air leak noted.    Pertinent Labs   Lab Results   Component Value Date    WBC 18.1 (H) 03/12/2022    HGB 8.6 (L) 03/12/2022    HCT 29.5 (L) 03/12/2022    MCV 84 03/12/2022     (H) 03/12/2022             Pertinent Radiology     [unfilled]        Perry Martell MD

## 2022-03-12 NOTE — PLAN OF CARE
Goal Outcome Evaluation:             Problem: Fall Injury Risk  Goal: Absence of Fall and Fall-Related Injury  Intervention: Identify and Manage Contributors  Recent Flowsheet Documentation  Taken 3/12/2022 0001 by Ginger Appiah RN  Medication Review/Management: medications reviewed     Problem: Fall Injury Risk  Goal: Absence of Fall and Fall-Related Injury  Intervention: Promote Injury-Free Environment  Recent Flowsheet Documentation  Taken 3/12/2022 0001 by Ginger Appiah RN  Safety Promotion/Fall Prevention:   activity supervised   assistive device/personal items within reach   bed alarm on   clutter free environment maintained   fall prevention program maintained   increase visualization of patient   lighting adjusted     Problem: Pain Acute  Goal: Acceptable Pain Control and Functional Ability  3/12/2022 0757 by Ginger Appiah RN  Outcome: Ongoing, Not Progressing  3/12/2022 0755 by Ginger Appiah RN  Outcome: Ongoing, Progressing  Intervention: Prevent or Manage Pain  Recent Flowsheet Documentation  Taken 3/12/2022 0001 by Ginger Appiah RN  Medication Review/Management: medications reviewed   Patient was only able to void 125cc. Straight cathed 375 ml. Chest tubes intact/patent. 70cc  output in # 1 and 2; 130cc in #3. Minimal pain which is resolved with Tylenol.

## 2022-03-12 NOTE — ANESTHESIA POSTPROCEDURE EVALUATION
Patient: Perry Betancourt    Procedure: Procedure(s):  LEFT THORACOSCOPY, CONVERTED TO  LEFT THORACOTOMY WITH DECORTICATION       Anesthesia Type:  General    Note:  Disposition: Inpatient   Postop Pain Control: Uneventful            Sign Out: Well controlled pain   PONV: No   Neuro/Psych: Uneventful            Sign Out: Acceptable/Baseline neuro status   Airway/Respiratory: Uneventful            Sign Out: Acceptable/Baseline resp. status   CV/Hemodynamics: Uneventful            Sign Out: Acceptable CV status; No obvious hypovolemia; No obvious fluid overload   Other NRE: NONE   DID A NON-ROUTINE EVENT OCCUR? No           Last vitals:  Vitals Value Taken Time   /58 03/11/22 1620   Temp 35.8  C (96.4  F) 03/11/22 1610   Pulse 98 03/11/22 1626   Resp 26 03/11/22 1625   SpO2 99 % 03/11/22 1626   Vitals shown include unvalidated device data.    Electronically Signed By: Ani Loyola MD  March 11, 2022  6:43 PM

## 2022-03-12 NOTE — PLAN OF CARE
Goal Outcome Evaluation:      Received call from Radiologist with CXR results. New minimal Right Apical Pneumothorax. Updated Dr. Kim .

## 2022-03-12 NOTE — PLAN OF CARE
Problem: Plan of Care - These are the overarching goals to be used throughout the patient stay.    Goal: Plan of Care Review/Shift Note  Description: The Plan of Care Review/Shift note should be completed every shift.  The Outcome Evaluation is a brief statement about your assessment that the patient is improving, declining, or no change.  This information will be displayed automatically on your shift note.  Outcome: Ongoing, Progressing  Goal: Optimal Comfort and Wellbeing  Outcome: Ongoing, Progressing  Intervention: Monitor Pain and Promote Comfort  Recent Flowsheet Documentation  Taken 3/11/2022 2022 by Aranza Zavala RN  Pain Management Interventions: medication (see MAR)     Problem: Fall Injury Risk  Goal: Absence of Fall and Fall-Related Injury  Outcome: Ongoing, Progressing     Problem: Pain Acute  Goal: Acceptable Pain Control and Functional Ability  Outcome: Ongoing, Progressing  Intervention: Develop Pain Management Plan  Recent Flowsheet Documentation  Taken 3/11/2022 2022 by Aranza Zavala, RN  Pain Management Interventions: medication (see MAR)     Problem: Infection  Goal: Absence of Infection Signs and Symptoms  Outcome: Ongoing, Progressing   Goal Outcome Evaluation:    Denies pain. Taking scheduled Tylenol. Unable to understand much regarding the incentive spirometer, only able to get up to 500. Updated Dr. Martell on patient having air leak in chest tubes 1 & 2 (y-sited together). Per Dr. Martell this is to be expected on & off for patient. Updated Dr. Martell of 96.7 temporal temp, , & BP 92/53. Received order for STAT CBC & to transfuse 1U PRBC's for Hgb <7.5. Also received order for 500 mL LR bolus, every day portable CXR, & BMP/CBC 3/12 at 6 am. Hgb came back at 7.2. Notified Dr. Higuera who stated to notify House Officer as unable to do phone consent. Notified Dr. Vu who came & received phone consent for blood transfusion from patient's brother Gaurav with writer. After type  & screen done & blood ready, started with Charge Nurse. No signs of transfusion reaction. 185 mL sanguinous out of chest tubes 1 & 2 on shift (250 mL total in canister). 265 mL sanguinous out of chest tube 3 on shift (425 mL total in canister). Clamps in room at the bedside taped to the wall. No void on shift, IV fluids running, had bolus, & bladder scan 66 mL at 2125. Received IV ABX on shift, had to ask pharmacy to adjust times. Continuous pulse ox intact, 88-89% on room air, back on 1L Oxymask to keep sats WNL. Tolerating clear liquids, advanced to full liquids. Patient refused applesauce, pudding, ice cream & sandwich. BP up to 119/60 after bolus. Also received order for abdominal binder, applied.

## 2022-03-12 NOTE — PHARMACY-VANCOMYCIN DOSING SERVICE
"Pharmacy Vancomycin Note  Date of Service 2022  Patient's  1945   76 year old, male    Indication: Empyema  Day of Therapy: 3  Current vancomycin regimen:  750 mg IV q12h  Current vancomycin monitoring method: AUC  Current vancomycin therapeutic monitoring goal: 400-600 mg*h/L    [employer]ight[employer] Prediction of Current Vancomycin Regimen  Loading dose: N/A  Regimen: 750 mg IV every 12 hours.  Start time: 13:57 on 2022  Exposure target: AUC24 (range)400-600 mg/L.hr   AUC24,ss: 509 mg/L.hr  Probability of AUC24 > 400: 89 %  Ctrough,ss: 16 mg/L  Probability of Ctrough,ss > 20: 18 %  Probability of nephrotoxicity (Lodise AL ): 11 %      Current estimated CrCl = Estimated Creatinine Clearance: 66.2 mL/min (based on SCr of 0.73 mg/dL).    Creatinine for last 3 days  3/10/2022:  2:31 PM Creatinine 0.68 mg/dL  3/11/2022:  5:58 AM Creatinine 0.63 mg/dL  3/12/2022:  5:34 AM Creatinine 0.73 mg/dL    Recent Vancomycin Levels (past 3 days)  3/12/2022:  5:34 AM Vancomycin 11.0 mg/L    Vancomycin IV Administrations (past 72 hours)                   vancomycin (VANCOCIN) 750 mg in sodium chloride 0.9 % 250 mL intermittent infusion (mg) 750 mg New Bag 22 1957     750 mg New Bag  0208    vancomycin (VANCOCIN) 1,000 mg in sodium chloride 0.9 % 250 mL intermittent infusion (mg) 1,000 mg New Bag 03/10/22 1427                Nephrotoxins and other renal medications (From now, onward)    Start     Dose/Rate Route Frequency Ordered Stop    22 0200  vancomycin (VANCOCIN) 750 mg in sodium chloride 0.9 % 250 mL intermittent infusion        \"Followed by\" Linked Group Details    750 mg  over 60-90 Minutes Intravenous EVERY 12 HOURS 03/10/22 1334      03/10/22 1230  ampicillin-sulbactam (UNASYN) 3 g vial to attach to  mL bag         3 g  over 15-30 Minutes Intravenous EVERY 6 HOURS 03/10/22 1156               Contrast Orders - past 72 hours (72h ago, onward)            None          Interpretation of levels " and current regimen:  Vancomycin level is reflective of -600    Has serum creatinine changed greater than 50% in last 72 hours: No    Urine output:  good urine output    Renal Function: Stable      Plan:  1. Continue Current Dose  2. Vancomycin monitoring method: AUC  3. Vancomycin therapeutic monitoring goal: 400-600 mg*h/L  4. Pharmacy will check vancomycin levels as appropriate in 1-3 Days.  5. Serum creatinine levels will be ordered daily for the first week of therapy and at least twice weekly for subsequent weeks.    Izzy Vann [facility]

## 2022-03-13 ENCOUNTER — APPOINTMENT (OUTPATIENT)
Dept: RADIOLOGY | Facility: HOSPITAL | Age: 77
DRG: 163 | End: 2022-03-13
Attending: SURGERY
Payer: MEDICARE

## 2022-03-13 ENCOUNTER — APPOINTMENT (OUTPATIENT)
Dept: PHYSICAL THERAPY | Facility: HOSPITAL | Age: 77
DRG: 163 | End: 2022-03-13
Payer: MEDICARE

## 2022-03-13 LAB
CREAT SERPL-MCNC: 0.55 MG/DL (ref 0.7–1.3)
GFR SERPL CREATININE-BSD FRML MDRD: >90 ML/MIN/1.73M2
GLUCOSE BLDC GLUCOMTR-MCNC: 116 MG/DL (ref 70–99)
GLUCOSE BLDC GLUCOMTR-MCNC: 132 MG/DL (ref 70–99)
GLUCOSE BLDC GLUCOMTR-MCNC: 142 MG/DL (ref 70–99)
LACTATE SERPL-SCNC: 1.2 MMOL/L (ref 0.7–2)
POTASSIUM BLD-SCNC: 3.5 MMOL/L (ref 3.5–5)

## 2022-03-13 PROCEDURE — 97110 THERAPEUTIC EXERCISES: CPT | Mod: GP

## 2022-03-13 PROCEDURE — 84132 ASSAY OF SERUM POTASSIUM: CPT | Performed by: INTERNAL MEDICINE

## 2022-03-13 PROCEDURE — 83605 ASSAY OF LACTIC ACID: CPT | Performed by: INTERNAL MEDICINE

## 2022-03-13 PROCEDURE — 71045 X-RAY EXAM CHEST 1 VIEW: CPT

## 2022-03-13 PROCEDURE — 99232 SBSQ HOSP IP/OBS MODERATE 35: CPT | Performed by: INTERNAL MEDICINE

## 2022-03-13 PROCEDURE — 258N000001 HC RX 258: Performed by: SURGERY

## 2022-03-13 PROCEDURE — 36415 COLL VENOUS BLD VENIPUNCTURE: CPT | Performed by: INTERNAL MEDICINE

## 2022-03-13 PROCEDURE — 250N000013 HC RX MED GY IP 250 OP 250 PS 637: Performed by: HOSPITALIST

## 2022-03-13 PROCEDURE — 258N000003 HC RX IP 258 OP 636: Performed by: INTERNAL MEDICINE

## 2022-03-13 PROCEDURE — 82565 ASSAY OF CREATININE: CPT | Performed by: INTERNAL MEDICINE

## 2022-03-13 PROCEDURE — 97530 THERAPEUTIC ACTIVITIES: CPT | Mod: GP

## 2022-03-13 PROCEDURE — 250N000011 HC RX IP 250 OP 636

## 2022-03-13 PROCEDURE — 99233 SBSQ HOSP IP/OBS HIGH 50: CPT | Performed by: INTERNAL MEDICINE

## 2022-03-13 PROCEDURE — 250N000011 HC RX IP 250 OP 636: Performed by: INTERNAL MEDICINE

## 2022-03-13 PROCEDURE — 120N000001 HC R&B MED SURG/OB

## 2022-03-13 RX ADMIN — ACETAMINOPHEN 975 MG: 325 TABLET ORAL at 05:12

## 2022-03-13 RX ADMIN — ACETAMINOPHEN 975 MG: 325 TABLET ORAL at 13:45

## 2022-03-13 RX ADMIN — SERTRALINE HYDROCHLORIDE 25 MG: 25 TABLET ORAL at 09:26

## 2022-03-13 RX ADMIN — VANCOMYCIN HYDROCHLORIDE 750 MG: 1 INJECTION, POWDER, LYOPHILIZED, FOR SOLUTION INTRAVENOUS at 09:27

## 2022-03-13 RX ADMIN — AMPICILLIN SODIUM AND SULBACTAM SODIUM 3 G: 2; 1 INJECTION, POWDER, FOR SOLUTION INTRAMUSCULAR; INTRAVENOUS at 05:41

## 2022-03-13 RX ADMIN — LORAZEPAM 2 MG: 1 TABLET ORAL at 15:54

## 2022-03-13 RX ADMIN — AMPICILLIN SODIUM AND SULBACTAM SODIUM 3 G: 2; 1 INJECTION, POWDER, FOR SOLUTION INTRAMUSCULAR; INTRAVENOUS at 19:34

## 2022-03-13 RX ADMIN — AMPICILLIN SODIUM AND SULBACTAM SODIUM 3 G: 2; 1 INJECTION, POWDER, FOR SOLUTION INTRAMUSCULAR; INTRAVENOUS at 00:17

## 2022-03-13 RX ADMIN — VANCOMYCIN HYDROCHLORIDE 750 MG: 1 INJECTION, POWDER, LYOPHILIZED, FOR SOLUTION INTRAVENOUS at 20:28

## 2022-03-13 RX ADMIN — AMPICILLIN SODIUM AND SULBACTAM SODIUM 3 G: 2; 1 INJECTION, POWDER, FOR SOLUTION INTRAMUSCULAR; INTRAVENOUS at 14:26

## 2022-03-13 RX ADMIN — DEXTROSE AND SODIUM CHLORIDE: 5; 450 INJECTION, SOLUTION INTRAVENOUS at 03:00

## 2022-03-13 RX ADMIN — DOCUSATE SODIUM 50 MG AND SENNOSIDES 8.6 MG 1 TABLET: 8.6; 5 TABLET, FILM COATED ORAL at 09:26

## 2022-03-13 ASSESSMENT — ACTIVITIES OF DAILY LIVING (ADL)
ADLS_ACUITY_SCORE: 15

## 2022-03-13 NOTE — PLAN OF CARE
"Goal Outcome Evaluation:                      Problem: Plan of Care - These are the overarching goals to be used throughout the patient stay.    Goal: Absence of Hospital-Acquired Illness or Injury  Outcome: Ongoing, Progressing  Intervention: Identify and Manage Fall Risk  Recent Flowsheet Documentation  Taken 3/13/2022 0001 by Ginger Appiah RN  Safety Promotion/Fall Prevention:   activity supervised   bed alarm on   clutter free environment maintained   fall prevention program maintained   increase visualization of patient   lighting adjusted     Problem: Plan of Care - These are the overarching goals to be used throughout the patient stay.    Goal: Plan of Care Review/Shift Note  Description: The Plan of Care Review/Shift note should be completed every shift.  The Outcome Evaluation is a brief statement about your assessment that the patient is improving, declining, or no change.  This information will be displayed automatically on your shift note.  Outcome: Ongoing, Progressing  Goal: Patient-Specific Goal (Individualized)  Description: You can add care plan individualizations to a care plan. Examples of Individualization might be:  \"Parent requests to be called daily at 9am for status\", \"I have a hard time hearing out of my right ear\", or \"Do not touch me to wake me up as it startles me\".  Outcome: Ongoing, Progressing  Goal: Absence of Hospital-Acquired Illness or Injury  Outcome: Ongoing, Progressing  Intervention: Identify and Manage Fall Risk  Recent Flowsheet Documentation  Taken 3/13/2022 0001 by Ginger Appiah RN  Safety Promotion/Fall Prevention:   activity supervised   bed alarm on   clutter free environment maintained   fall prevention program maintained   increase visualization of patient   lighting adjusted  Goal: Optimal Comfort and Wellbeing  Outcome: Ongoing, Progressing  Goal: Readiness for Transition of Care  Outcome: Ongoing, Progressing   Chest Tubes patent, serosanguinous drainage. " Dressing intact. Minimal pain controlled with Tylenol. Voided adequate amounts in urinal.

## 2022-03-13 NOTE — PLAN OF CARE
Problem: Plan of Care - These are the overarching goals to be used throughout the patient stay.    Goal: Optimal Comfort and Wellbeing  Outcome: Ongoing, Progressing     Problem: Pain Acute  Goal: Acceptable Pain Control and Functional Ability  Outcome: Ongoing, Progressing     Problem: Infection  Goal: Absence of Infection Signs and Symptoms  Outcome: Ongoing, Progressing   Goal Outcome Evaluation:        Pt refusing to order supper. Not hungry. Offered water and fluids. Pt just taking sips. Oxygen on r/a 86%. Pt will not leave nasal canal on .  Will leave oxymask on. Oxygen on at 3 L with oxymask. Pt appears comfortable-denies pain.     Noted Bs elevated this am. Check

## 2022-03-13 NOTE — PROGRESS NOTES
[facility]    Medicine Progress Note - Hospitalist Service    Date of Admission:  3/8/2022    Assessment & Plan             Assessment & Plan     Perry Betancourt is a 76 year old male from group home with history of hypertension, Intellectual disability, hypertension, OCD, anxiety disorder and mood disorder admitted on 3/8/2022 due to mechanical fall and head laceration. CT C-spine showed pleural fluid filling the left lung apex. Chest x-ray  showed moderate to marked left sided pleural fluid collection.     Left pleural fluid culture grew Eikenella corrodens and fusobacterium nucleatum. Likely related to aspiration in the setting of poor dentition and recent fall with head injury as per ID.  ID recommended Unasyn and vancomycin as well as chest CT for possible residual effusion or loculation. CT chest showed oblong loculated pleural fluid collection in the posterior left chest with circumscribed surrounding peel consistent with empyema    On 3/11/22, CTS performed thoracoscopy with lavage and cleanout left lower lobe and left upper lobe, Left thoracoscopy with conversion to thoracotomy due to longstanding marked empyema requiring complete decortication and complete parietal pleurectomy and placement of chest tubes.      Status post mechanical fall  Empyema of left pleural space    On 3/11/22, CTS performed thoracoscopy with lavage and cleanout left lower lobe and left upper lobe, Left thoracoscopy with conversion to thoracotomy due to longstanding marked empyema requiring complete decortication and complete parietal pleurectomy and placement of chest tubes.   -Breathing comfortably on room air   -Left pleural fluid culture grew Eikenella corrodens and fusobacterium nucleatum. Likely related to aspiration in the setting of poor dentition and recent fall with head injury as per ID.    - Unasyn and vancomycin as per ID - started 3/10/22  -Chest tube management as per surgery service   -ID  follow up - appreciate assistance     Head injury secondary to fall  -Patient presented with mechanical fall from his group home.   CT scan of the head showed questionable subdural hygroma measuring up to 0.9 cm  versus prominence of subarachnoid space overlying the left parietal lobe.  CT C-spine  showed no acute fractures.   -Laceration on his head has been repaired in the ED.  -Fall precaution          Hypertension-stable  -Continue PTA amlodipine, atenolol     Mood disorder  -Continue PTA sertraline, ativan     Diet: Regular  DVT Prophylaxis: Pneumatic Compression Devices  Terry Catheter: Not present  Central Lines: None  Code Status: Full code         Disposition Plan     Expected Discharge: Few days   Anticipated discharge location:  Awaiting care coordination huddle  Barriers to discharge: antibiotic therapy, pleural fluid culture, chest tube management, surgery and ID recommendations.        Diet: Full Liquid Diet    DVT Prophylaxis: Pneumatic Compression Devices  Terry Catheter: Not present  Central Lines: None  Cardiac Monitoring: None  Code Status: Full Code      Disposition Plan   Expected Discharge: 03/14/2022     Anticipated discharge location:  Awaiting care coordination huddle  Delays:     placement, thoracentesis for pleural effusion with pleural fluid analysis       The patient's care was discussed with the Bedside Nurse and Patient.     Aarti Kim MD  Hospitalist Service  [facility]  Securely message with the Vocera Web Console (learn more here)  Text page via Online [employer] Paging/Directory         Clinically Significant Risk Factors Present on Admission                     ______________________________________________________________________    Interval History   No new complaints. He denies chest pain, fever, cough or sob. Pain is well controlled. Chest tubes in place draining serosanguinous fluid.     Data reviewed today: I reviewed all medications, new labs and  imaging results over the last 24 hours. I personally reviewed no images or EKG's today.    Physical Exam   Vital Signs: Temp: 97.3  F (36.3  C) Temp src: Oral BP: 134/63 Pulse: 94   Resp: 22 SpO2: 97 % O2 Device: Nasal cannula Oxygen Delivery: 3 LPM  Weight: 118 lbs 0 oz  Constitutional: awake, alert, cooperative, no apparent distress, frail, and appears stated age, hard of hearing  Head: wound dressing in the right parietal region - clean and dry    Mouth: Poor dentition+   Respiratory: Diminished air entry bilaterally   Cardiovascular: Normal apical impulse, regular rate and rhythm, normal S1 and S2, no S3 or S4, and no murmur noted  Chest: Chest tubes in place draining serosanguinous fluid.   GI: No scars, normal bowel sounds, soft, non-distended, non-tender, no masses palpated, no hepatosplenomegally  Musculoskeletal: There is no redness, warmth, or swelling of the joints.  Full range of motion noted.  Motor strength is 5 out of 5 all extremities bilaterally.    Neurologic: Awake, alert, oriented to name, place and time.  Motor is 5 out of 5 bilaterally.     Data   Recent Labs   Lab 03/13/22  0640 03/13/22  0441 03/12/22  0534 03/11/22 2015 03/11/22  0558 03/11/22  0018 03/10/22  1431 03/09/22  0921 03/09/22  0541   WBC  --   --  18.1* 23.7*  --   --  15.8*  --  10.2   HGB  --   --  8.6* 7.2*  --   --  9.7*  --  7.8*   MCV  --   --  84 83  --   --  82  --  83   PLT  --   --  574* 541*  --   --  600*  --  437   NA  --   --  137  --   --   --  139  --  141   POTASSIUM 3.5  --  3.7  --  3.5   < > 2.9*  --  3.5   CHLORIDE  --   --  106  --   --   --  104  --  108*   CO2  --   --  20*  --   --   --  24  --  25   BUN  --   --  8  --   --   --  10  --  9   CR 0.55*  --  0.73  --  0.63*  --  0.68*  --  0.64*   ANIONGAP  --   --  11  --   --   --  11  --  8   CRYSTAL  --   --  7.2*  --   --   --  8.0*  --  7.9*   GLC  --  132* 300*  --   --   --  138*  --  109   PROTTOTAL  --   --   --   --   --   --   --  7.3  --     < > =  values in this interval not displayed.

## 2022-03-13 NOTE — PROGRESS NOTES
Infectious Disease Progress Note    Assessment/Plan  Impression: Left pleural empyema with Eikenella, fusobacteria and possibly also some streptococcoid organism(s), not totally final yet.      Patient has poor dentition and a recent fall and head injury, so suspect oral anaerobes.    Significant complex and loculated empyema on CT.  consult Dr Perry Martell for surgical evaluation.    PROCEDURE DATE:  3/8/2022 - 3/11/2022     PREOPERATIVE DIAGNOSIS: Left sided thoracic empyema with pneumonia     POSTOPERATIVE DIAGNOSIS: Same with marked thickening of the parietal pleura and approximately 1 to 2 cm peel around the majority of the left thorax and left upper and lower lobes    PROCEDURE: 1.  Thoracoscopy with lavage and cleanout left lower lobe and left upper lobe 2.  Left thoracoscopy with initial dissection with need for conversion to thoracotomy for longstanding marked empyema and thick peel 3.  Left thoracotomy complete decortication and complete parietal pleurectomy placement of 336 [place] chest tubes     Recommendations:  Unasyn and vancomycin seems appropriate.    Drop vanco likely monday     Addendum:    Active Problems:    Pleural effusion    Fall    Fall, initial encounter    Traumatic head injury with multiple lacerations, initial encounter    Empyema of left pleural space (H)    Poor dentition      DARCI Perez MD    Subjective  Stable.  Chest tubes in.     Objective    Vital signs in last 24 hours  Temp:  [97.2  F (36.2  C)-98  F (36.7  C)] 97.3  F (36.3  C)  Pulse:  [] 94  Resp:  [18-22] 22  BP: (119-143)/(56-67) 134/63  SpO2:  [86 %-97 %] 97 %  Wt Readings from Last 3 Encounters:   03/10/22 53.5 kg (118 lb)           Intake/Output last 3 shifts  I/O last 3 completed shifts:  In: 2947.5 [P.O.:200; I.V.:2747.5]  Out: 1570 [Urine:1000; Chest Tube:570]  Intake/Output this shift:  No intake/output data recorded.    Review of Systems   Pertinent items are noted in HPI., otherwise negative.    Physical  Exam  General appearance: alert, appears stated age and cooperative  Head: laceration noted on top of skull  Eyes: EOMI, no icterus  Mouth:  Poor dentition  Neck: no adenopathy and supple, symmetrical, trachea midline  Lungs: decreased breath sounds on L side of chest  Heart: RRR, no murmur  Abdomen: soft, non-tender; bowel sounds normal; no masses,  no organomegaly  Extremities: warm and dry  Skin: Skin color, texture, turgor normal. No rashes or lesions  Neurologic: Mental status: alert and cooperative.  somewhat repetitive       Pertinent Labs   Lab Results: personally reviewed.     Recent Labs   Lab 03/12/22  0534 03/11/22  2015 03/10/22  1431   WBC 18.1* 23.7* 15.8*   HGB 8.6* 7.2* 9.7*   HCT 29.5* 25.3* 33.7*   * 541* 600*        Recent Labs   Lab 03/12/22  0534 03/10/22  1431 03/09/22  0541    139 141   CO2 20* 24 25   BUN 8 10 9     No results found for: CULT  Collected Updated Procedure Result Status    03/09/2022 1759 03/10/2022 1335 Pleural fluid Aerobic Bacterial Culture Routine with Gram Stain [[id number]]   (Abnormal)   Pleural fluid from Pleural Cavity, Left    Preliminary result Component Value   Culture No growth, less than 1 day P   Gram Stain Result 4+ Gram positive cocci Abnormal  P    4+ WBC seen Abnormal  P    Predominantly PMNs           10/16/2021 1011 03/08/2022 1032 COVID-19 VIRUS (CORONAVIRUS) BY PCR (EXTERNAL RESULT) Final result Component Value   No component results           03/22/2021 1056 03/08/2022 1032 COVID-19 VIRUS (CORONAVIRUS) BY PCR (EXTERNAL RESULT) [[id number]]    Swab    Final result Component Value   COVID-19 Virus by PCR (External Result) Not Detected          Pertinent Radiology   Radiology Results:   XR Chest 1 View    Result Date: 3/9/2022  EXAM: XR CHEST 1 VIEW LOCATION: [facility] DATE/TIME: 3/9/2022 2:25 PM INDICATION: Question pleural effusion. COMPARISON: None.     IMPRESSION: Heart and mediastinal size within normal  limits. There is a moderate left pleural effusion. Associated compressive atelectasis is present at the left lung base with concurrent infectious process not excluded. The right lung is clear. There is  minimal amount of scarring versus atelectasis at the lateral right lung base. No pneumothorax.    US Thoracentesis    Result Date: 3/9/2022  EXAM: 1. LEFT THORACENTESIS 2. ULTRASOUND GUIDANCE LOCATION: [facility] DATE/TIME: 3/9/2022 5:05 PM INDICATION: Pleural effusion. PROCEDURE: Informed consent obtained. Time out performed. The chest was prepped and draped in sterile fashion. 5 mL of 1 % lidocaine was infused into the local soft tissues. Under direct ultrasound guidance, a 5 Malay catheter system was placed into the  pleural effusion. 0.1 liters of foamy yellow material was removed and sent to lab. The foamy quality was very atypical. Patient tolerated procedure well. Ultrasound imaging was obtained and placed in the patient's permanent medical record.     IMPRESSION: Status post left ultrasound-guided thoracentesis. Reference CPT Code: 07154    XR Chest Port 1 View    Result Date: 3/13/2022  EXAM: XR CHEST PORT 1 VIEW LOCATION: [facility] DATE/TIME: 3/13/2022 5:57 AM INDICATION: Follow-up right-sided pneumothorax and indwelling left-sided chest tubes COMPARISON: 03/12/2021     IMPRESSION: Stable positioning of multiple left-sided chest tubes. No left-sided pneumothorax. Interval increase in volume loss and pleural fluid left lung base. Infiltrate and pleural fluid right lung base unchanged. Right apical pneumothorax at 2 cm, previously 1.5 cm.    XR Chest Port 1 View    Result Date: 3/12/2022  EXAM: XR CHEST PORT 1 VIEW LOCATION: [facility] DATE/TIME: 3/12/2022 5:56 AM INDICATION: Follow-up chest tube placement and pneumothorax. COMPARISON: Chest radiograph 03/09/2022, ultrasound-guided thoracentesis 03/09/2022, CT 03/10/2022      IMPRESSION: New minimal right apical pneumothorax. Separation of visceral and parietal pleura by 1.5 cm. Minimal right basilar pleural fluid with atelectasis. 3 left-sided chest tubes in place. No definitive evidence for left-sided pneumothorax. Marked subcutaneous emphysema left lateral chest wall. Atelectasis or infiltrate left lung base. Mild cardiac enlargement. Normal pulmonary vascularity. [Critical Result: New pneumothorax] Patient's nurse was contacted by me on 3/12/2022 7:00 AM and verbalized understanding of the critical result.     CT Chest w/o Contrast    Result Date: 3/10/2022  EXAM: CT CHEST W/O CONTRAST LOCATION: [facility] DATE/TIME: 3/10/2022 2:07 PM INDICATION: Evaluate pleural effusion COMPARISON: Portable AP view of the chest 03/09/2022 TECHNIQUE: CT chest without IV contrast. Multiplanar reformats were obtained. Dose reduction techniques were used. CONTRAST: None. FINDINGS: LUNGS AND PLEURA: There is a loculated fluid collection in the posterior left pleural space with a circumscribed slightly higher attenuation rim that measures around 4 cm AP and is 16 cm craniocaudal. The loculated area has small foci of suspended air likely from the recent thoracentesis and indicates the fluid is complex. There is separate free fluid which layers into the left posterior costophrenic sulcus external to the focal loculation. Complete atelectasis of the left upper lobe along the mediastinal pleura. There is also multisegment atelectasis in the basilar segments of the left lower lobe. Within the atelectatic territories there are foci of high attenuation/calcification. There are bands of atelectasis in the right lower lobe, inferior right middle lobe, inferior right upper lobe. Adjacent to the atelectatic bands are also indistinct nodular opacities particularly in the middle and upper lobes, not well characterized due to motion-related blurring but likely reflecting a component of  acute inflammation. The trachea and central airways are patent. No visible intraluminal material in the larger medium airways. MEDIASTINUM: Moderate hiatal hernia. Patulous esophagus which contains both air and fluid to near the level of the thoracic inlet. Cardiac chambers are normal in size. No pericardial effusion. Normal caliber thoracic aorta. CORONARY ARTERY CALCIFICATION: None. UPPER ABDOMEN: There are peripherally calcified stones in the lumen of the gallbladder. No gallbladder wall thickening or pericholecystic edema. MUSCULOSKELETAL: Minimal thoracic degenerative osteophytes. No aggressive or destructive bone lesions.     IMPRESSION: 1.  [place] loculated pleural fluid collection in the posterior left chest with circumscribed surrounding peel consistent with empyema. Separate smaller amount of free fluid layering into the posterior sulcus. 2.  Lobar atelectasis of the left upper lobe compressed against the mediastinal pleura. Multisegment atelectasis in the basal left lower lobe. Consider surgical consultation given constellation of #1 and #2. 3.  Hiatal hernia and patulous esophagus, which can predispose to aspiration events. 4.  Nodular opacities associated with bands of atelectasis in the right middle and upper lobes and could relate to aspiration. 5.  Cholelithiasis.

## 2022-03-13 NOTE — PROGRESS NOTES
Progress Note    Assessment/Plan  Comfortable-cont ct    Active Problems:    Pleural effusion    Fall    Fall, initial encounter    Traumatic head injury with multiple lacerations, initial encounter    Empyema of left pleural space (H)    Poor dentition      Subjective  No pain  Objective    Vital signs in last 24 hours  Temp:  [97.2  F (36.2  C)-98  F (36.7  C)] 97.3  F (36.3  C)  Pulse:  [] 94  Resp:  [18-22] 22  BP: (119-143)/(56-67) 134/63  SpO2:  [86 %-100 %] 97 %  Weight:   [unfilled]    Intake/Output last 3 shifts  I/O last 3 completed shifts:  In: 2947.5 [P.O.:200; I.V.:2747.5]  Out: 1570 [Urine:1000; Chest Tube:570]  Intake/Output this shift:  No intake/output data recorded.      Physical Exam  No air leak-cxr noted-small pneump rt probably due to thoracoscopy    Pertinent Labs   Lab Results   Component Value Date    WBC 18.1 (H) 03/12/2022    HGB 8.6 (L) 03/12/2022    HCT 29.5 (L) 03/12/2022    MCV 84 03/12/2022     (H) 03/12/2022             Pertinent Radiology     [unfilled]        ePrry Martell MD

## 2022-03-13 NOTE — PLAN OF CARE
"  Problem: Plan of Care - These are the overarching goals to be used throughout the patient stay.    Goal: Plan of Care Review/Shift Note  Description: The Plan of Care Review/Shift note should be completed every shift.  The Outcome Evaluation is a brief statement about your assessment that the patient is improving, declining, or no change.  This information will be displayed automatically on your shift note.  Outcome: Ongoing, Progressing  Flowsheets (Taken 3/13/2022 1136)  Plan of Care Reviewed With: patient  Goal: Patient-Specific Goal (Individualized)  Description: You can add care plan individualizations to a care plan. Examples of Individualization might be:  \"Parent requests to be called daily at 9am for status\", \"I have a hard time hearing out of my right ear\", or \"Do not touch me to wake me up as it startles me\".  Outcome: Ongoing, Progressing  Goal: Absence of Hospital-Acquired Illness or Injury  Outcome: Ongoing, Progressing  Goal: Optimal Comfort and Wellbeing  Outcome: Ongoing, Progressing  Goal: Readiness for Transition of Care  Outcome: Ongoing, Progressing     Problem: Risk for Delirium  Goal: Improved Sleep  Outcome: Ongoing, Progressing     Problem: Fall Injury Risk  Goal: Absence of Fall and Fall-Related Injury  Outcome: Ongoing, Progressing     Problem: Pain Acute  Goal: Acceptable Pain Control and Functional Ability  Outcome: Ongoing, Progressing     Problem: Infection  Goal: Absence of Infection Signs and Symptoms  Outcome: Ongoing, Progressing     Problem: Urinary Elimination Management  Goal: Effective Urinary Elimination/Continence  Outcome: Ongoing, Progressing   Goal Outcome Evaluation:    Plan of Care Reviewed With: patient                 "

## 2022-03-14 ENCOUNTER — ANESTHESIA (OUTPATIENT)
Dept: SURGERY | Facility: HOSPITAL | Age: 77
DRG: 163 | End: 2022-03-14
Payer: MEDICARE

## 2022-03-14 ENCOUNTER — APPOINTMENT (OUTPATIENT)
Dept: RADIOLOGY | Facility: HOSPITAL | Age: 77
DRG: 163 | End: 2022-03-14
Attending: SURGERY
Payer: MEDICARE

## 2022-03-14 ENCOUNTER — APPOINTMENT (OUTPATIENT)
Dept: PHYSICAL THERAPY | Facility: HOSPITAL | Age: 77
DRG: 163 | End: 2022-03-14
Payer: MEDICARE

## 2022-03-14 ENCOUNTER — APPOINTMENT (OUTPATIENT)
Dept: OCCUPATIONAL THERAPY | Facility: HOSPITAL | Age: 77
DRG: 163 | End: 2022-03-14
Payer: MEDICARE

## 2022-03-14 LAB
C REACTIVE PROTEIN LHE: 18.1 MG/DL (ref 0–0.8)
CREAT SERPL-MCNC: 0.59 MG/DL (ref 0.7–1.3)
GFR SERPL CREATININE-BSD FRML MDRD: >90 ML/MIN/1.73M2
GLUCOSE BLDC GLUCOMTR-MCNC: 106 MG/DL (ref 70–99)
GLUCOSE BLDC GLUCOMTR-MCNC: 120 MG/DL (ref 70–99)
GLUCOSE BLDC GLUCOMTR-MCNC: 123 MG/DL (ref 70–99)
GLUCOSE BLDC GLUCOMTR-MCNC: 125 MG/DL (ref 70–99)
HOLD SPECIMEN: NORMAL
PATH REPORT.COMMENTS IMP SPEC: NORMAL
PATH REPORT.COMMENTS IMP SPEC: NORMAL
PATH REPORT.FINAL DX SPEC: NORMAL
PATH REPORT.GROSS SPEC: NORMAL
PATH REPORT.MICROSCOPIC SPEC OTHER STN: NORMAL
PATH REPORT.RELEVANT HX SPEC: NORMAL
PHOTO IMAGE: NORMAL
POTASSIUM BLD-SCNC: 3.3 MMOL/L (ref 3.5–5)
POTASSIUM BLD-SCNC: 3.5 MMOL/L (ref 3.5–5)
SARS-COV-2 RNA RESP QL NAA+PROBE: NEGATIVE

## 2022-03-14 PROCEDURE — 250N000013 HC RX MED GY IP 250 OP 250 PS 637: Performed by: SURGERY

## 2022-03-14 PROCEDURE — 87635 SARS-COV-2 COVID-19 AMP PRB: CPT | Performed by: INTERNAL MEDICINE

## 2022-03-14 PROCEDURE — 250N000011 HC RX IP 250 OP 636: Performed by: INTERNAL MEDICINE

## 2022-03-14 PROCEDURE — 97535 SELF CARE MNGMENT TRAINING: CPT | Mod: GO

## 2022-03-14 PROCEDURE — 71045 X-RAY EXAM CHEST 1 VIEW: CPT

## 2022-03-14 PROCEDURE — 82565 ASSAY OF CREATININE: CPT | Performed by: INTERNAL MEDICINE

## 2022-03-14 PROCEDURE — 120N000001 HC R&B MED SURG/OB

## 2022-03-14 PROCEDURE — 99232 SBSQ HOSP IP/OBS MODERATE 35: CPT | Performed by: INTERNAL MEDICINE

## 2022-03-14 PROCEDURE — 84132 ASSAY OF SERUM POTASSIUM: CPT | Performed by: INTERNAL MEDICINE

## 2022-03-14 PROCEDURE — 250N000011 HC RX IP 250 OP 636

## 2022-03-14 PROCEDURE — 250N000013 HC RX MED GY IP 250 OP 250 PS 637: Performed by: HOSPITALIST

## 2022-03-14 PROCEDURE — 36415 COLL VENOUS BLD VENIPUNCTURE: CPT | Performed by: INTERNAL MEDICINE

## 2022-03-14 PROCEDURE — 258N000003 HC RX IP 258 OP 636: Performed by: INTERNAL MEDICINE

## 2022-03-14 PROCEDURE — 86140 C-REACTIVE PROTEIN: CPT | Performed by: INTERNAL MEDICINE

## 2022-03-14 PROCEDURE — 88307 TISSUE EXAM BY PATHOLOGIST: CPT | Mod: 26 | Performed by: PATHOLOGY

## 2022-03-14 PROCEDURE — 97110 THERAPEUTIC EXERCISES: CPT | Mod: GP

## 2022-03-14 PROCEDURE — 99233 SBSQ HOSP IP/OBS HIGH 50: CPT | Performed by: INTERNAL MEDICINE

## 2022-03-14 RX ORDER — POTASSIUM CHLORIDE 1500 MG/1
20 TABLET, EXTENDED RELEASE ORAL ONCE
Status: COMPLETED | OUTPATIENT
Start: 2022-03-14 | End: 2022-03-14

## 2022-03-14 RX ORDER — POTASSIUM CHLORIDE 7.45 MG/ML
10 INJECTION INTRAVENOUS
Status: CANCELLED | OUTPATIENT
Start: 2022-03-14 | End: 2022-03-14

## 2022-03-14 RX ADMIN — DOCUSATE SODIUM 50 MG AND SENNOSIDES 8.6 MG 1 TABLET: 8.6; 5 TABLET, FILM COATED ORAL at 08:58

## 2022-03-14 RX ADMIN — POTASSIUM CHLORIDE 20 MEQ: 1500 TABLET, EXTENDED RELEASE ORAL at 09:01

## 2022-03-14 RX ADMIN — ACETAMINOPHEN 975 MG: 325 TABLET ORAL at 05:56

## 2022-03-14 RX ADMIN — VANCOMYCIN HYDROCHLORIDE 750 MG: 1 INJECTION, POWDER, LYOPHILIZED, FOR SOLUTION INTRAVENOUS at 09:02

## 2022-03-14 RX ADMIN — ACETAMINOPHEN 975 MG: 325 TABLET ORAL at 21:42

## 2022-03-14 RX ADMIN — LORAZEPAM 2 MG: 1 TABLET ORAL at 15:47

## 2022-03-14 RX ADMIN — SERTRALINE HYDROCHLORIDE 25 MG: 25 TABLET ORAL at 08:58

## 2022-03-14 RX ADMIN — ACETAMINOPHEN 975 MG: 325 TABLET ORAL at 13:40

## 2022-03-14 RX ADMIN — AMPICILLIN SODIUM AND SULBACTAM SODIUM 3 G: 2; 1 INJECTION, POWDER, FOR SOLUTION INTRAMUSCULAR; INTRAVENOUS at 01:38

## 2022-03-14 RX ADMIN — POLYETHYLENE GLYCOL 3350 17 G: 17 POWDER, FOR SOLUTION ORAL at 09:03

## 2022-03-14 RX ADMIN — AMPICILLIN SODIUM AND SULBACTAM SODIUM 3 G: 2; 1 INJECTION, POWDER, FOR SOLUTION INTRAMUSCULAR; INTRAVENOUS at 09:01

## 2022-03-14 RX ADMIN — DOCUSATE SODIUM 50 MG AND SENNOSIDES 8.6 MG 1 TABLET: 8.6; 5 TABLET, FILM COATED ORAL at 21:43

## 2022-03-14 RX ADMIN — AMPICILLIN SODIUM AND SULBACTAM SODIUM 3 G: 2; 1 INJECTION, POWDER, FOR SOLUTION INTRAMUSCULAR; INTRAVENOUS at 14:51

## 2022-03-14 RX ADMIN — AMPICILLIN SODIUM AND SULBACTAM SODIUM 3 G: 2; 1 INJECTION, POWDER, FOR SOLUTION INTRAMUSCULAR; INTRAVENOUS at 19:42

## 2022-03-14 ASSESSMENT — ACTIVITIES OF DAILY LIVING (ADL)
ADLS_ACUITY_SCORE: 15

## 2022-03-14 NOTE — PLAN OF CARE
Problem: Plan of Care - These are the overarching goals to be used throughout the patient stay.    Goal: Absence of Hospital-Acquired Illness or Injury  Intervention: Identify and Manage Fall Risk  Recent Flowsheet Documentation  Taken 3/14/2022 0120 by Ginger Appiah RN  Safety Promotion/Fall Prevention:   activity supervised   assistive device/personal items within reach   bed alarm on   clutter free environment maintained   fall prevention program maintained   nonskid shoes/slippers when out of bed   patient and family education   room near nurse's station     Problem: Pain Acute  Goal: Acceptable Pain Control and Functional Ability  Outcome: Ongoing, Progressing     Problem: Urinary Elimination Management  Goal: Effective Urinary Elimination/Continence  Outcome: Ongoing, Progressing   Goal Outcome Evaluation:  Good pain control wit scheduled Tylenol, otherwise patient denies pain. Chest tubes patent; 60cc in 1&2 and 30cc in #3, all serosanguinous drainage. Voiding adequate amounts in urinal. Spot check Blood Glucose 106.

## 2022-03-14 NOTE — PROGRESS NOTES
Care Management Follow Up    Length of Stay (days): 6    Expected Discharge Date: 03/16/2022     Concerns to be Addressed:   IV abx, chest tubes, medical progression, ID following   Patient plan of care discussed at interdisciplinary rounds: Yes    Anticipated Discharge Disposition: Group Home, Home Care     Anticipated Discharge Services:  To be determined  Anticipated Discharge DME:  To be determined    Education Provided on the Discharge Plan:  Per care team  Patient/Family in Agreement with the Plan: yes    Referrals Placed by CM/SW: none at this time   Private pay costs discussed: Not applicable    Additional Information:  Chart review completed.  Care management following progression, will review care team recommendations, and assist as needed with discharge care coordination.    Writer received call from Elliott with [facility] to provide updates on patient status and anticipated date of discharge.  Informed Elliott that IV antibiotics any not anticipated for discharge.  If IV therapy is needed, [facility] can not provide that service.  TRE will keep home care agency updated.      Mercy Rutherford RN

## 2022-03-14 NOTE — PROGRESS NOTES
[facility]    Medicine Progress Note - Hospitalist Service    Date of Admission:  3/8/2022  Assessment & Plan   Perry Betancourt is a 76 year old male from group home with history of hypertension, Intellectual disability, hypertension, OCD, anxiety disorder and mood disorder admitted on 3/8/2022 due to mechanical fall and head laceration. CT C-spine showed pleural fluid filling the left lung apex. Chest x-ray  showed moderate to marked left sided pleural fluid collection.     Left pleural fluid culture grew Eikenella corrodens and fusobacterium nucleatum. Likely related to aspiration in the setting of poor dentition and recent fall with head injury as per ID.  ID recommended Unasyn and vancomycin as well as chest CT for possible residual effusion or loculation. CT chest showed oblong loculated pleural fluid collection in the posterior left chest with circumscribed surrounding peel consistent with empyema    On 3/11/22, CTS performed thoracoscopy with lavage and cleanout left lower lobe and left upper lobe, Left thoracoscopy with conversion to thoracotomy due to longstanding marked empyema requiring complete decortication and complete parietal pleurectomy and placement of chest tubes.      Status post mechanical fall  Empyema of left pleural space    On 3/11/22, CTS performed thoracoscopy with lavage and cleanout left lower lobe and left upper lobe, Left thoracoscopy with conversion to thoracotomy due to longstanding marked empyema requiring complete decortication and complete parietal pleurectomy and placement of chest tubes.   -Breathing comfortably on room air   -Left pleural fluid culture grew Eikenella corrodens and fusobacterium nucleatum. Likely related to aspiration in the setting of poor dentition and recent fall with head injury as per ID.    -Received IV vancomycin (3/10/22 -3/14/22)  - Continue IV Unasyn for now with plan to transition to oral antibiotics possibly Augmentin at  discharge; started 3/10/22  -Chest tube management as per surgery service   -ID follow up - appreciate assistance     Head injury secondary to fall  -Patient presented with mechanical fall from his group home.   CT scan of the head showed questionable subdural hygroma measuring up to 0.9 cm  versus prominence of subarachnoid space overlying the left parietal lobe.  CT C-spine  showed no acute fractures.   -Laceration on his head has been repaired in the ED.  -Fall precaution          Hypertension-stable  -Continue PTA amlodipine, atenolol     Mood disorder  -Continue PTA sertraline, ativan     Diet: Regular  DVT Prophylaxis: Pneumatic Compression Devices  Terry Catheter: Not present  Central Lines: None  Code Status: Full code         Disposition Plan     Expected Discharge: Few days   Anticipated discharge location:  Awaiting care coordination huddle  Barriers to discharge: antibiotic therapy, pleural fluid culture, chest tube management, surgery and ID recommendations.        Diet: Full Liquid Diet    DVT Prophylaxis: Pneumatic Compression Devices  Terry Catheter: Not present  Central Lines: None  Cardiac Monitoring: None  Code Status: Full Code      Disposition Plan   Expected Discharge: 03/16/2022     Anticipated discharge location: group home;home with help/services    Delays:     IV Medication - consider oral or Home Infusion  Lab Result Pending (enter specific test & time in comments)   placement, thoracentesis for pleural effusion with pleural fluid analysis       The patient's care was discussed with the Bedside Nurse and Patient.     Aarti Kim MD  Hospitalist Service  [facility]  Securely message with the Vocera Web Console (learn more here)  Text page via [employer] Paging/Directory         Clinically Significant Risk Factors Present on Admission                     ______________________________________________________________________    Interval History   Required 3L of  supplemental oxygen overnight as per nurse. No new complaints. He denies chest pain, fever, cough or sob. Pain is well controlled. Chest tubes in place draining serosanguinous fluid.     Data reviewed today: I reviewed all medications, new labs and imaging results over the last 24 hours. I personally reviewed no images or EKG's today.    Physical Exam   Vital Signs: Temp: 97.4  F (36.3  C) Temp src: Oral BP: 108/60 Pulse: 97   Resp: 20 SpO2: 97 % O2 Device: Nasal cannula Oxygen Delivery: 3 LPM  Weight: 118 lbs 0 oz  Constitutional: awake, alert, cooperative, no apparent distress, frail, and appears stated age, hard of hearing  Head: wound dressing in the right parietal region - clean and dry    Mouth: Poor dentition+   Respiratory: Diminished air entry bilaterally   Cardiovascular: Normal apical impulse, regular rate and rhythm, normal S1 and S2, no S3 or S4, and no murmur noted  Chest: Chest tubes in place draining serosanguinous fluid.   GI: No scars, normal bowel sounds, soft, non-distended, non-tender, no masses palpated, no hepatosplenomegally  Musculoskeletal: There is no redness, warmth, or swelling of the joints.  Full range of motion noted.  Motor strength is 5 out of 5 all extremities bilaterally.    Neurologic: Awake, alert, oriented to name, place and time.  Motor is 5 out of 5 bilaterally.     Data   Recent Labs   Lab 03/14/22  1313 03/14/22  0920 03/14/22  0620 03/14/22  0457 03/13/22  1819 03/13/22  0640 03/12/22  2248 03/12/22  0534 03/11/22 2015 03/11/22  0018 03/10/22  1431 03/09/22  0921 03/09/22  0541   WBC  --   --   --   --   --   --   --  18.1* 23.7*  --  15.8*  --  10.2   HGB  --   --   --   --   --   --   --  8.6* 7.2*  --  9.7*  --  7.8*   MCV  --   --   --   --   --   --   --  84 83  --  82  --  83   PLT  --   --   --   --   --   --   --  574* 541*  --  600*  --  437   NA  --   --   --   --   --   --   --  137  --   --  139  --  141   POTASSIUM 3.5  --  3.3*  --   --  3.5  --  3.7  --     < > 2.9*  --  3.5   CHLORIDE  --   --   --   --   --   --   --  106  --   --  104  --  108*   CO2  --   --   --   --   --   --   --  20*  --   --  24  --  25   BUN  --   --   --   --   --   --   --  8  --   --  10  --  9   CR  --   --  0.59*  --   --  0.55*  --  0.73  --    < > 0.68*  --  0.64*   ANIONGAP  --   --   --   --   --   --   --  11  --   --  11  --  8   CRYSTAL  --   --   --   --   --   --   --  7.2*  --   --  8.0*  --  7.9*   GLC  --  120*  --  106* 116*  --    < > 300*  --   --  138*  --  109   PROTTOTAL  --   --   --   --   --   --   --   --   --   --   --  7.3  --     < > = values in this interval not displayed.

## 2022-03-14 NOTE — PLAN OF CARE
"  Problem: Plan of Care - These are the overarching goals to be used throughout the patient stay.    Goal: Plan of Care Review/Shift Note  Description: The Plan of Care Review/Shift note should be completed every shift.  The Outcome Evaluation is a brief statement about your assessment that the patient is improving, declining, or no change.  This information will be displayed automatically on your shift note.  Outcome: Ongoing, Progressing  Goal: Patient-Specific Goal (Individualized)  Description: You can add care plan individualizations to a care plan. Examples of Individualization might be:  \"Parent requests to be called daily at 9am for status\", \"I have a hard time hearing out of my right ear\", or \"Do not touch me to wake me up as it startles me\".  Outcome: Ongoing, Progressing  Goal: Absence of Hospital-Acquired Illness or Injury  Outcome: Ongoing, Progressing  Intervention: Identify and Manage Fall Risk  Recent Flowsheet Documentation  Taken 3/13/2022 1550 by Mary Turner RN  Safety Promotion/Fall Prevention:   activity supervised   room door open   nonskid shoes/slippers when out of bed  Intervention: Prevent Skin Injury  Recent Flowsheet Documentation  Taken 3/13/2022 1822 by Mary Turner RN  Body Position: supine  Intervention: Prevent and Manage VTE (Venous Thromboembolism) Risk  Recent Flowsheet Documentation  Taken 3/13/2022 1822 by Mary Turner RN  Activity Management: activity adjusted per tolerance  Goal: Optimal Comfort and Wellbeing  Outcome: Ongoing, Progressing  Goal: Readiness for Transition of Care  Outcome: Ongoing, Progressing     Problem: Risk for Delirium  Goal: Optimal Coping  Outcome: Ongoing, Progressing  Goal: Improved Behavioral Control  Outcome: Ongoing, Progressing  Goal: Improved Attention and Thought Clarity  Outcome: Ongoing, Progressing  Goal: Improved Sleep  Outcome: Ongoing, Progressing     Problem: Fall Injury Risk  Goal: Absence of Fall and Fall-Related " Injury  Outcome: Ongoing, Progressing  Intervention: Identify and Manage Contributors  Recent Flowsheet Documentation  Taken 3/13/2022 1550 by Mary Turner RN  Medication Review/Management: medications reviewed  Intervention: Promote Injury-Free Environment  Recent Flowsheet Documentation  Taken 3/13/2022 1550 by Mary Turner RN  Safety Promotion/Fall Prevention:   activity supervised   room door open   nonskid shoes/slippers when out of bed     Problem: Pain Acute  Goal: Acceptable Pain Control and Functional Ability  Outcome: Ongoing, Progressing  Intervention: Prevent or Manage Pain  Recent Flowsheet Documentation  Taken 3/13/2022 1550 by Mary Turner RN  Medication Review/Management: medications reviewed     Problem: Infection  Goal: Absence of Infection Signs and Symptoms  Outcome: Ongoing, Progressing     Problem: Urinary Elimination Management  Goal: Effective Urinary Elimination/Continence  Outcome: Ongoing, Progressing   Goal Outcome Evaluation:        Pt up on the chair most of the shift,denies pain or discomfort.Declined to eat stating he doesn't need that now,couple sips of water this evening .Sepsis BPA fired lactic 1.2.chest tubes intact.

## 2022-03-14 NOTE — PROGRESS NOTES
Progress Note    Assessment/Plan  No air leak noted today minimal output.  Potential removal of tube in the next day or 2.    Active Problems:    Pleural effusion    Fall    Fall, initial encounter    Traumatic head injury with multiple lacerations, initial encounter    Empyema of left pleural space (H)    Poor dentition      Subjective  Quite comfortable  Objective    Vital signs in last 24 hours  Temp:  [97.4  F (36.3  C)-98.9  F (37.2  C)] 98.9  F (37.2  C)  Pulse:  [] 91  Resp:  [18-22] 18  BP: (107-135)/(60-66) 107/66  SpO2:  [87 %-97 %] 96 %  Weight:   [unfilled]    Intake/Output last 3 shifts  I/O last 3 completed shifts:  In: 240 [P.O.:240]  Out: 1235 [Urine:975; Chest Tube:260]  Intake/Output this shift:  I/O this shift:  In: -   Out: 120 [Chest Tube:120]      Physical Exam  As noted no air leak output decreasing chest x-ray noted    Pertinent Labs   Lab Results   Component Value Date    WBC 18.1 (H) 03/12/2022    HGB 8.6 (L) 03/12/2022    HCT 29.5 (L) 03/12/2022    MCV 84 03/12/2022     (H) 03/12/2022             Pertinent Radiology     [unfilled]        Perry Martell MD

## 2022-03-14 NOTE — PROGRESS NOTES
Infectious Disease Progress Note    Assessment/Plan   Left pleural empyema with Eikenella, fusobacteria and possibly also some streptococcoid organism(s), not totally final yet.   No staph  Patient has poor dentition and a recent fall and head injury, so suspect oral anaerobes.    Significant complex and loculated empyema on CT.     PROCEDURE DATE:  3/8/2022 - 3/11/2022    PROCEDURE: 1.  Thoracoscopy with lavage and cleanout left lower lobe and left upper lobe 2.  Left thoracoscopy with initial dissection with need for conversion to thoracotomy for longstanding marked empyema and thick peel 3.  Left thoracotomy complete decortication and complete parietal pleurectomy placement of 336 Italian chest tubes    COVID 2021     Recommendations:     Keep UNASYN  discontinue vancomycin  PO abx reasonable at discharge  eg PIO Lazaro M.D.      Subjective  Stable.   He feels good  Ate breakfast  Slept well      Objective    Vital signs in last 24 hours  Temp:  [97.4  F (36.3  C)-98.6  F (37  C)] 97.4  F (36.3  C)  Pulse:  [101-106] 106  Resp:  [20-22] 22  BP: (103-135)/(55-65) 124/60  SpO2:  [87 %-97 %] 91 %  Wt Readings from Last 3 Encounters:   03/10/22 53.5 kg (118 lb)           Intake/Output last 3 shifts  I/O last 3 completed shifts:  In: 240 [P.O.:240]  Out: 1985 [Urine:1475; Chest Tube:510]  Intake/Output this shift:  I/O this shift:  In: -   Out: 200 [Urine:200]    Review of Systems   Pertinent items are noted in HPI., otherwise negative.    Physical Exam  General appearance: alert, appears stated age and cooperative  Head: laceration noted on top of skull  Eyes: EOMI, no icterus  Mouth:  Poor dentition  Neck: no adenopathy and supple, symmetrical, trachea midline  Lungs: decreased breath sounds on L side of chest  Heart: RRR, no murmur  Abdomen: soft, non-tender; bowel sounds normal; no masses,  no organomegaly  Extremities: warm and dry  Skin: Skin color, texture, turgor normal. No rashes or  lesions  Neurologic: Mental status: alert and cooperative.  somewhat repetitive       Pertinent Labs   Lab Results: personally reviewed.     Recent Labs   Lab 03/12/22  0534 03/11/22  2015 03/10/22  1431   WBC 18.1* 23.7* 15.8*   HGB 8.6* 7.2* 9.7*   HCT 29.5* 25.3* 33.7*   * 541* 600*        Recent Labs   Lab 03/12/22  0534 03/10/22  1431 03/09/22  0541    139 141   CO2 20* 24 25   BUN 8 10 9     No results found for: CULT  Collected Updated Procedure Result Status    03/09/2022 1759 03/10/2022 1335 Pleural fluid Aerobic Bacterial Culture Routine with Gram Stain [[id number]]   (Abnormal)   Pleural fluid from Pleural Cavity, Left    Preliminary result Component Value   Culture No growth, less than 1 day P   Gram Stain Result 4+ Gram positive cocci Abnormal  P    4+ WBC seen Abnormal  P    Predominantly PMNs           10/16/2021 1011 03/08/2022 1032 COVID-19 VIRUS (CORONAVIRUS) BY PCR (EXTERNAL RESULT) Final result Component Value   No component results           03/22/2021 1056 03/08/2022 1032 COVID-19 VIRUS (CORONAVIRUS) BY PCR (EXTERNAL RESULT) [[id number]]    Swab    Final result Component Value   COVID-19 Virus by PCR (External Result) Not Detected          Pertinent Radiology   Radiology Results:   XR Chest 1 View    Result Date: 3/9/2022  EXAM: XR CHEST 1 VIEW LOCATION: [facility] DATE/TIME: 3/9/2022 2:25 PM INDICATION: Question pleural effusion. COMPARISON: None.     IMPRESSION: Heart and mediastinal size within normal limits. There is a moderate left pleural effusion. Associated compressive atelectasis is present at the left lung base with concurrent infectious process not excluded. The right lung is clear. There is  minimal amount of scarring versus atelectasis at the lateral right lung base. No pneumothorax.    US Thoracentesis    Result Date: 3/9/2022  EXAM: 1. LEFT THORACENTESIS 2. ULTRASOUND GUIDANCE LOCATION: [facility] DATE/TIME:  3/9/2022 5:05 PM INDICATION: Pleural effusion. PROCEDURE: Informed consent obtained. Time out performed. The chest was prepped and draped in sterile fashion. 5 mL of 1 % lidocaine was infused into the local soft tissues. Under direct ultrasound guidance, a 5 [place] catheter system was placed into the  pleural effusion. 0.1 liters of foamy yellow material was removed and sent to lab. The foamy quality was very atypical. Patient tolerated procedure well. Ultrasound imaging was obtained and placed in the patient's permanent medical record.     IMPRESSION: Status post left ultrasound-guided thoracentesis. Reference CPT Code: 15302    XR Chest Port 1 View    Result Date: 3/14/2022  EXAM: XR CHEST PORT 1 VIEW LOCATION: [facility] DATE/TIME: 3/14/2022 6:15 AM INDICATION: Left-sided chest tubes and right-sided pneumothorax. Follow-up assessment. COMPARISON: Multiple prior studies with most recent from 03/13/2022     IMPRESSION: Interval decrease in right apical pneumothorax with a decrease in the separation of the visceral parietal pleura which no measures 1.1 cm, previously 2 cm. Multiple left-sided chest tube stable position. Minimal left apical pneumothorax. Pleural fluid and atelectasis or infiltrate in the left perihilar region left lung base. Minimal right basilar pleural fluid with infiltrate or atelectasis. Cardiac enlargement.    XR Chest Port 1 View    Result Date: 3/13/2022  EXAM: XR CHEST PORT 1 VIEW LOCATION: [facility] DATE/TIME: 3/13/2022 5:57 AM INDICATION: Follow-up right-sided pneumothorax and indwelling left-sided chest tubes COMPARISON: 03/12/2021     IMPRESSION: Stable positioning of multiple left-sided chest tubes. No left-sided pneumothorax. Interval increase in volume loss and pleural fluid left lung base. Infiltrate and pleural fluid right lung base unchanged. Right apical pneumothorax at 2 cm, previously 1.5 cm.    XR Chest Port 1 View    Result  Date: 3/12/2022  EXAM: XR CHEST PORT 1 VIEW LOCATION: [facility] DATE/TIME: 3/12/2022 5:56 AM INDICATION: Follow-up chest tube placement and pneumothorax. COMPARISON: Chest radiograph 03/09/2022, ultrasound-guided thoracentesis 03/09/2022, CT 03/10/2022     IMPRESSION: New minimal right apical pneumothorax. Separation of visceral and parietal pleura by 1.5 cm. Minimal right basilar pleural fluid with atelectasis. 3 left-sided chest tubes in place. No definitive evidence for left-sided pneumothorax. Marked subcutaneous emphysema left lateral chest wall. Atelectasis or infiltrate left lung base. Mild cardiac enlargement. Normal pulmonary vascularity. [Critical Result: New pneumothorax] Patient's nurse was contacted by me on 3/12/2022 7:00 AM and verbalized understanding of the critical result.     CT Chest w/o Contrast    Result Date: 3/10/2022  EXAM: CT CHEST W/O CONTRAST LOCATION: [facility] DATE/TIME: 3/10/2022 2:07 PM INDICATION: Evaluate pleural effusion COMPARISON: Portable AP view of the chest 03/09/2022 TECHNIQUE: CT chest without IV contrast. Multiplanar reformats were obtained. Dose reduction techniques were used. CONTRAST: None. FINDINGS: LUNGS AND PLEURA: There is a loculated fluid collection in the posterior left pleural space with a circumscribed slightly higher attenuation rim that measures around 4 cm AP and is 16 cm craniocaudal. The loculated area has small foci of suspended air likely from the recent thoracentesis and indicates the fluid is complex. There is separate free fluid which layers into the left posterior costophrenic sulcus external to the focal loculation. Complete atelectasis of the left upper lobe along the mediastinal pleura. There is also multisegment atelectasis in the basilar segments of the left lower lobe. Within the atelectatic territories there are foci of high attenuation/calcification. There are bands of atelectasis in the  right lower lobe, inferior right middle lobe, inferior right upper lobe. Adjacent to the atelectatic bands are also indistinct nodular opacities particularly in the middle and upper lobes, not well characterized due to motion-related blurring but likely reflecting a component of acute inflammation. The trachea and central airways are patent. No visible intraluminal material in the larger medium airways. MEDIASTINUM: Moderate hiatal hernia. Patulous esophagus which contains both air and fluid to near the level of the thoracic inlet. Cardiac chambers are normal in size. No pericardial effusion. Normal caliber thoracic aorta. CORONARY ARTERY CALCIFICATION: None. UPPER ABDOMEN: There are peripherally calcified stones in the lumen of the gallbladder. No gallbladder wall thickening or pericholecystic edema. MUSCULOSKELETAL: Minimal thoracic degenerative osteophytes. No aggressive or destructive bone lesions.     IMPRESSION: 1.  [place] loculated pleural fluid collection in the posterior left chest with circumscribed surrounding peel consistent with empyema. Separate smaller amount of free fluid layering into the posterior sulcus. 2.  Lobar atelectasis of the left upper lobe compressed against the mediastinal pleura. Multisegment atelectasis in the basal left lower lobe. Consider surgical consultation given constellation of #1 and #2. 3.  Hiatal hernia and patulous esophagus, which can predispose to aspiration events. 4.  Nodular opacities associated with bands of atelectasis in the right middle and upper lobes and could relate to aspiration. 5.  Cholelithiasis.

## 2022-03-14 NOTE — PLAN OF CARE
Pt denies pain. Polyethylene glycol removed d/t aide reported loose stools X 2.  Noted left hand is swollen. No IV access on right hand a this time.     Problem: Plan of Care - These are the overarching goals to be used throughout the patient stay.    Goal: Absence of Hospital-Acquired Illness or Injury  Intervention: Identify and Manage Fall Risk  Recent Flowsheet Documentation  Taken 3/14/2022 0845 by Ree Biggs RN  Safety Promotion/Fall Prevention:   bed alarm on   chair alarm on     Problem: Plan of Care - These are the overarching goals to be used throughout the patient stay.    Goal: Optimal Comfort and Wellbeing  Outcome: Ongoing, Progressing  Intervention: Monitor Pain and Promote Comfort  Recent Flowsheet Documentation  Taken 3/14/2022 0845 by Ree Biggs RN  Pain Management Interventions: medication (see MAR)

## 2022-03-15 ENCOUNTER — APPOINTMENT (OUTPATIENT)
Dept: RADIOLOGY | Facility: HOSPITAL | Age: 77
DRG: 163 | End: 2022-03-15
Attending: SURGERY
Payer: MEDICARE

## 2022-03-15 LAB
BACTERIA PLR CULT: ABNORMAL
CREAT SERPL-MCNC: 0.56 MG/DL (ref 0.7–1.3)
GFR SERPL CREATININE-BSD FRML MDRD: >90 ML/MIN/1.73M2
GLUCOSE BLDC GLUCOMTR-MCNC: 113 MG/DL (ref 70–99)
GLUCOSE BLDC GLUCOMTR-MCNC: 150 MG/DL (ref 70–99)
GRAM STAIN RESULT: ABNORMAL
GRAM STAIN RESULT: ABNORMAL
POTASSIUM BLD-SCNC: 3.3 MMOL/L (ref 3.5–5)
POTASSIUM BLD-SCNC: 3.8 MMOL/L (ref 3.5–5)

## 2022-03-15 PROCEDURE — 120N000001 HC R&B MED SURG/OB

## 2022-03-15 PROCEDURE — 36415 COLL VENOUS BLD VENIPUNCTURE: CPT | Performed by: INTERNAL MEDICINE

## 2022-03-15 PROCEDURE — 250N000013 HC RX MED GY IP 250 OP 250 PS 637: Performed by: INTERNAL MEDICINE

## 2022-03-15 PROCEDURE — 84132 ASSAY OF SERUM POTASSIUM: CPT | Performed by: INTERNAL MEDICINE

## 2022-03-15 PROCEDURE — 71045 X-RAY EXAM CHEST 1 VIEW: CPT

## 2022-03-15 PROCEDURE — 250N000013 HC RX MED GY IP 250 OP 250 PS 637: Performed by: HOSPITALIST

## 2022-03-15 PROCEDURE — 82565 ASSAY OF CREATININE: CPT | Performed by: INTERNAL MEDICINE

## 2022-03-15 PROCEDURE — 250N000011 HC RX IP 250 OP 636

## 2022-03-15 PROCEDURE — 99232 SBSQ HOSP IP/OBS MODERATE 35: CPT | Performed by: INTERNAL MEDICINE

## 2022-03-15 RX ORDER — GINSENG 100 MG
CAPSULE ORAL ONCE
Status: DISCONTINUED | OUTPATIENT
Start: 2022-03-15 | End: 2022-03-21 | Stop reason: HOSPADM

## 2022-03-15 RX ORDER — AMLODIPINE BESYLATE 5 MG/1
5 TABLET ORAL DAILY
Status: DISCONTINUED | OUTPATIENT
Start: 2022-03-15 | End: 2022-03-21 | Stop reason: HOSPADM

## 2022-03-15 RX ORDER — POTASSIUM CHLORIDE 1500 MG/1
20 TABLET, EXTENDED RELEASE ORAL ONCE
Status: COMPLETED | OUTPATIENT
Start: 2022-03-15 | End: 2022-03-15

## 2022-03-15 RX ORDER — HYDRALAZINE HYDROCHLORIDE 20 MG/ML
10 INJECTION INTRAMUSCULAR; INTRAVENOUS EVERY 4 HOURS PRN
Status: DISCONTINUED | OUTPATIENT
Start: 2022-03-15 | End: 2022-03-21 | Stop reason: HOSPADM

## 2022-03-15 RX ADMIN — ACETAMINOPHEN 975 MG: 325 TABLET ORAL at 20:50

## 2022-03-15 RX ADMIN — ACETAMINOPHEN 975 MG: 325 TABLET ORAL at 04:27

## 2022-03-15 RX ADMIN — LORAZEPAM 2 MG: 1 TABLET ORAL at 16:00

## 2022-03-15 RX ADMIN — AMPICILLIN SODIUM AND SULBACTAM SODIUM 3 G: 2; 1 INJECTION, POWDER, FOR SOLUTION INTRAMUSCULAR; INTRAVENOUS at 01:18

## 2022-03-15 RX ADMIN — AMPICILLIN SODIUM AND SULBACTAM SODIUM 3 G: 2; 1 INJECTION, POWDER, FOR SOLUTION INTRAMUSCULAR; INTRAVENOUS at 08:30

## 2022-03-15 RX ADMIN — AMPICILLIN SODIUM AND SULBACTAM SODIUM 3 G: 2; 1 INJECTION, POWDER, FOR SOLUTION INTRAMUSCULAR; INTRAVENOUS at 13:59

## 2022-03-15 RX ADMIN — AMPICILLIN SODIUM AND SULBACTAM SODIUM 3 G: 2; 1 INJECTION, POWDER, FOR SOLUTION INTRAMUSCULAR; INTRAVENOUS at 20:01

## 2022-03-15 RX ADMIN — POTASSIUM CHLORIDE 20 MEQ: 1500 TABLET, EXTENDED RELEASE ORAL at 08:30

## 2022-03-15 RX ADMIN — AMLODIPINE BESYLATE 5 MG: 5 TABLET ORAL at 12:08

## 2022-03-15 RX ADMIN — ACETAMINOPHEN 975 MG: 325 TABLET ORAL at 12:07

## 2022-03-15 RX ADMIN — SERTRALINE HYDROCHLORIDE 25 MG: 25 TABLET ORAL at 08:30

## 2022-03-15 ASSESSMENT — ACTIVITIES OF DAILY LIVING (ADL)
ADLS_ACUITY_SCORE: 15
ADLS_ACUITY_SCORE: 13
ADLS_ACUITY_SCORE: 15
ADLS_ACUITY_SCORE: 13
ADLS_ACUITY_SCORE: 15
ADLS_ACUITY_SCORE: 13
ADLS_ACUITY_SCORE: 15
ADLS_ACUITY_SCORE: 13
ADLS_ACUITY_SCORE: 15

## 2022-03-15 NOTE — PROGRESS NOTES
Infectious Disease Progress Note    Assessment/Plan   Left pleural empyema with Eikenella, fusobacteria and possibly also some streptococcoid organism(s), not totally final yet.   No staph  Patient has poor dentition and a recent fall and head injury, so suspect oral anaerobes.    Significant complex and loculated empyema on CT.     PROCEDURE DATE:  3/8/2022 - 3/11/2022    PROCEDURE: 1.  Thoracoscopy with lavage and cleanout left lower lobe and left upper lobe 2.  Left thoracoscopy with initial dissection with need for conversion to thoracotomy for longstanding marked empyema and thick peel 3.  Left thoracotomy complete decortication and complete parietal pleurectomy placement of 336 [place] chest tubes    COVID 2021     Recommendations:     Keep UNASYN    PO abx reasonable at discharge  eg PIO Lazaro M.D.      Subjective  Stable.   He feels good  Ate breakfast  Slept well      Objective    Vital signs in last 24 hours  Temp:  [97.1  F (36.2  C)-98.9  F (37.2  C)] 97.1  F (36.2  C)  Pulse:  [79-94] 80  Resp:  [16-18] 18  BP: (106-185)/(60-96) 148/74  SpO2:  [96 %-98 %] 97 %  Wt Readings from Last 3 Encounters:   03/10/22 53.5 kg (118 lb)           Intake/Output last 3 shifts  I/O last 3 completed shifts:  In: 340 [P.O.:340]  Out: 840 [Urine:600; Chest Tube:240]  Intake/Output this shift:  I/O this shift:  In: -   Out: 40 [Chest Tube:40]    Review of Systems   Pertinent items are noted in HPI., otherwise negative.    Physical Exam  General appearance: alert, appears stated age and cooperative  Head: laceration noted on top of skull  Eyes: EOMI, no icterus  Mouth:  Poor dentition  Neck: no adenopathy and supple, symmetrical, trachea midline  Lungs: decreased breath sounds on L side of chest  Heart: RRR, no murmur  Abdomen: soft, non-tender; bowel sounds normal; no masses,  no organomegaly  Extremities: warm and dry  Skin: Skin color, texture, turgor normal. No rashes or lesions  Neurologic: Mental  status: alert and cooperative.  somewhat repetitive       Pertinent Labs   Lab Results: personally reviewed.     Recent Labs   Lab 03/12/22  0534 03/11/22  2015 03/10/22  1431   WBC 18.1* 23.7* 15.8*   HGB 8.6* 7.2* 9.7*   HCT 29.5* 25.3* 33.7*   * 541* 600*        Recent Labs   Lab 03/12/22  0534 03/10/22  1431 03/09/22  0541    139 141   CO2 20* 24 25   BUN 8 10 9     No results found for: CULT  Collected Updated Procedure Result Status    03/09/2022 1759 03/10/2022 1335 Pleural fluid Aerobic Bacterial Culture Routine with Gram Stain [[id number]]   (Abnormal)   Pleural fluid from Pleural Cavity, Left    Preliminary result Component Value   Culture No growth, less than 1 day P   Gram Stain Result 4+ Gram positive cocci Abnormal  P    4+ WBC seen Abnormal  P    Predominantly PMNs           10/16/2021 1011 03/08/2022 1032 COVID-19 VIRUS (CORONAVIRUS) BY PCR (EXTERNAL RESULT) Final result Component Value   No component results           03/22/2021 1056 03/08/2022 1032 COVID-19 VIRUS (CORONAVIRUS) BY PCR (EXTERNAL RESULT) [[id number]]    Swab    Final result Component Value   COVID-19 Virus by PCR (External Result) Not Detected          Pertinent Radiology   Radiology Results:   XR Chest 1 View    Result Date: 3/9/2022  EXAM: XR CHEST 1 VIEW LOCATION: [facility] DATE/TIME: 3/9/2022 2:25 PM INDICATION: Question pleural effusion. COMPARISON: None.     IMPRESSION: Heart and mediastinal size within normal limits. There is a moderate left pleural effusion. Associated compressive atelectasis is present at the left lung base with concurrent infectious process not excluded. The right lung is clear. There is  minimal amount of scarring versus atelectasis at the lateral right lung base. No pneumothorax.    US Thoracentesis    Result Date: 3/9/2022  EXAM: 1. LEFT THORACENTESIS 2. ULTRASOUND GUIDANCE LOCATION: [facility] DATE/TIME: 3/9/2022 5:05 PM INDICATION:  Pleural effusion. PROCEDURE: Informed consent obtained. Time out performed. The chest was prepped and draped in sterile fashion. 5 mL of 1 % lidocaine was infused into the local soft tissues. Under direct ultrasound guidance, a 5 [place] catheter system was placed into the  pleural effusion. 0.1 liters of foamy yellow material was removed and sent to lab. The foamy quality was very atypical. Patient tolerated procedure well. Ultrasound imaging was obtained and placed in the patient's permanent medical record.     IMPRESSION: Status post left ultrasound-guided thoracentesis. Reference CPT Code: 69376    XR Chest Port 1 View    Result Date: 3/15/2022  EXAM: XR CHEST PORT 1 VIEW LOCATION: [facility] DATE/TIME: 3/15/2022 5:47 AM INDICATION: Indwelling chest tube tip. Follow-up assessment. COMPARISON: 03/14/2022     IMPRESSION: Stable positioning of 3 left-sided chest tubes. Minimal interval increase in pleural fluid and atelectasis in the left perihilar region/left upper lung with minimal residual left upper lung aeration remaining. Left apical pneumothorax visualized. Resolution of the right apical pneumothorax. Minimal right basilar pleural fluid. Mild cardiac enlargement. Normal pulmonary vascularity.    XR Chest Port 1 View    Result Date: 3/14/2022  EXAM: XR CHEST PORT 1 VIEW LOCATION: [facility] DATE/TIME: 3/14/2022 6:15 AM INDICATION: Left-sided chest tubes and right-sided pneumothorax. Follow-up assessment. COMPARISON: Multiple prior studies with most recent from 03/13/2022     IMPRESSION: Interval decrease in right apical pneumothorax with a decrease in the separation of the visceral parietal pleura which no measures 1.1 cm, previously 2 cm. Multiple left-sided chest tube stable position. Minimal left apical pneumothorax. Pleural fluid and atelectasis or infiltrate in the left perihilar region left lung base. Minimal right basilar pleural fluid with infiltrate or  atelectasis. Cardiac enlargement.    XR Chest Port 1 View    Result Date: 3/13/2022  EXAM: XR CHEST PORT 1 VIEW LOCATION: [facility] DATE/TIME: 3/13/2022 5:57 AM INDICATION: Follow-up right-sided pneumothorax and indwelling left-sided chest tubes COMPARISON: 03/12/2021     IMPRESSION: Stable positioning of multiple left-sided chest tubes. No left-sided pneumothorax. Interval increase in volume loss and pleural fluid left lung base. Infiltrate and pleural fluid right lung base unchanged. Right apical pneumothorax at 2 cm, previously 1.5 cm.    XR Chest Port 1 View    Result Date: 3/12/2022  EXAM: XR CHEST PORT 1 VIEW LOCATION: [facility] DATE/TIME: 3/12/2022 5:56 AM INDICATION: Follow-up chest tube placement and pneumothorax. COMPARISON: Chest radiograph 03/09/2022, ultrasound-guided thoracentesis 03/09/2022, CT 03/10/2022     IMPRESSION: New minimal right apical pneumothorax. Separation of visceral and parietal pleura by 1.5 cm. Minimal right basilar pleural fluid with atelectasis. 3 left-sided chest tubes in place. No definitive evidence for left-sided pneumothorax. Marked subcutaneous emphysema left lateral chest wall. Atelectasis or infiltrate left lung base. Mild cardiac enlargement. Normal pulmonary vascularity. [Critical Result: New pneumothorax] Patient's nurse was contacted by me on 3/12/2022 7:00 AM and verbalized understanding of the critical result.     CT Chest w/o Contrast    Result Date: 3/10/2022  EXAM: CT CHEST W/O CONTRAST LOCATION: [facility] DATE/TIME: 3/10/2022 2:07 PM INDICATION: Evaluate pleural effusion COMPARISON: Portable AP view of the chest 03/09/2022 TECHNIQUE: CT chest without IV contrast. Multiplanar reformats were obtained. Dose reduction techniques were used. CONTRAST: None. FINDINGS: LUNGS AND PLEURA: There is a loculated fluid collection in the posterior left pleural space with a circumscribed slightly higher  attenuation rim that measures around 4 cm AP and is 16 cm craniocaudal. The loculated area has small foci of suspended air likely from the recent thoracentesis and indicates the fluid is complex. There is separate free fluid which layers into the left posterior costophrenic sulcus external to the focal loculation. Complete atelectasis of the left upper lobe along the mediastinal pleura. There is also multisegment atelectasis in the basilar segments of the left lower lobe. Within the atelectatic territories there are foci of high attenuation/calcification. There are bands of atelectasis in the right lower lobe, inferior right middle lobe, inferior right upper lobe. Adjacent to the atelectatic bands are also indistinct nodular opacities particularly in the middle and upper lobes, not well characterized due to motion-related blurring but likely reflecting a component of acute inflammation. The trachea and central airways are patent. No visible intraluminal material in the larger medium airways. MEDIASTINUM: Moderate hiatal hernia. Patulous esophagus which contains both air and fluid to near the level of the thoracic inlet. Cardiac chambers are normal in size. No pericardial effusion. Normal caliber thoracic aorta. CORONARY ARTERY CALCIFICATION: None. UPPER ABDOMEN: There are peripherally calcified stones in the lumen of the gallbladder. No gallbladder wall thickening or pericholecystic edema. MUSCULOSKELETAL: Minimal thoracic degenerative osteophytes. No aggressive or destructive bone lesions.     IMPRESSION: 1.  [place] loculated pleural fluid collection in the posterior left chest with circumscribed surrounding peel consistent with empyema. Separate smaller amount of free fluid layering into the posterior sulcus. 2.  Lobar atelectasis of the left upper lobe compressed against the mediastinal pleura. Multisegment atelectasis in the basal left lower lobe. Consider surgical consultation given constellation of #1 and #2.  3.  Hiatal hernia and patulous esophagus, which can predispose to aspiration events. 4.  Nodular opacities associated with bands of atelectasis in the right middle and upper lobes and could relate to aspiration. 5.  Cholelithiasis.

## 2022-03-15 NOTE — PLAN OF CARE
Problem: Urinary Elimination Management  Goal: Effective Urinary Elimination/Continence  Outcome: Ongoing, Not Progressing     Problem: Risk for Delirium  Goal: Improved Behavioral Control  Outcome: Ongoing, Progressing     Problem: Pain Acute  Goal: Acceptable Pain Control and Functional Ability  Outcome: Ongoing, Progressing  Intervention: Prevent or Manage Pain  Recent Flowsheet Documentation  Taken 3/15/2022 0830 by Serene Cox RN  Medication Review/Management: medications reviewed   Goal Outcome Evaluation:        Pt confused as is from a .  Currently has 3 chest tubes in left chest.  Pt denies pain, remains on wall suction.  BP elevated at noon, MD restarted Amlodipine and BP improved.

## 2022-03-15 NOTE — PROGRESS NOTES
Patient found on floor at 635 pm. Nurse outside the room heard a noise and went in to see him sitting on the floor in front of his chair. Chest tubes remain intact. Patient stated he did not hurt himself. No injuries noted. Chair alarm had been turned off. House officer was called and informed of fall. She will come up and to see patient. Family was also called and informed of fall.

## 2022-03-15 NOTE — PLAN OF CARE
"  Problem: Pain Acute  Goal: Acceptable Pain Control and Functional Ability  Outcome: Ongoing, Progressing  Intervention: Prevent or Manage Pain  Recent Flowsheet Documentation  Taken 3/15/2022 0400 by Elizabeth Marinelli, RN  Medication Review/Management: medications reviewed  Taken 3/15/2022 0000 by Elizabeth Marinelli, RN  Medication Review/Management: medications reviewed   Goal Outcome Evaluation:      Patient denies pain. Scheduled Tylenol given. Patient refusing most cares/assessments this shift. Re approached with different staff with some success. \"Don't touch me! Leave me alone. I want to sleep.\" Abdominal binder in place, chest tubes minimal output, serosanguinous drainage noted in tubing. O2 satuation % on 2L per NC.                 "

## 2022-03-15 NOTE — SIGNIFICANT EVENT
Significant Event Note    Time of event: 7:00 PM     Description of event:  House officer called and informed that patient experienced a fall. Fall was not witnessed, but nursing came in immediately after to find patient sitting on the floor. He denies hitting head. Patient already had a scalp laceration from previous fall prior to admission; no new injuries of head or extremities. Patient denies pain. Patient is not on anticoagulation.     Plan:  No need for head CT at this time. Patient was found immediately after fall and did not hit head. Not on anticoagulation. No other work-up needed at this time.     Discussed with: bedside nurse    Rosa M Dsouza MD

## 2022-03-15 NOTE — PROGRESS NOTES
Progress Note    Assessment/Plan  We will continue chest tubes today.  Chest x-ray noted.  Potential removal in the next day or 2.    Active Problems:    Pleural effusion    Fall    Fall, initial encounter    Traumatic head injury with multiple lacerations, initial encounter    Empyema of left pleural space (H)    Poor dentition      Subjective  Comfortable  Objective    Vital signs in last 24 hours  Temp:  [97.1  F (36.2  C)-98.9  F (37.2  C)] 97.1  F (36.2  C)  Pulse:  [79-97] 84  Resp:  [16-20] 18  BP: (106-128)/(60-68) 128/68  SpO2:  [96 %-98 %] 96 %  Weight:   [unfilled]    Intake/Output last 3 shifts  I/O last 3 completed shifts:  In: 340 [P.O.:340]  Out: 840 [Urine:600; Chest Tube:240]  Intake/Output this shift:  No intake/output data recorded.      Physical Exam  No change good respiratory effort no obvious air leak    Pertinent Labs   Lab Results   Component Value Date    WBC 18.1 (H) 03/12/2022    HGB 8.6 (L) 03/12/2022    HCT 29.5 (L) 03/12/2022    MCV 84 03/12/2022     (H) 03/12/2022             Pertinent Radiology     [unfilled]        Perry Martell MD

## 2022-03-15 NOTE — PROGRESS NOTES
[facility] [place] [facility] Progress Note      ASSESSMENT:    Active Problems:    Pleural effusion    Fall    Fall, initial encounter    Traumatic head injury with multiple lacerations, initial encounter    Empyema of left pleural space (H)    Poor dentition      PLAN:   76 year old male from group home with history of hypertension, Intellectual disability, hypertension, OCD, anxiety disorder and mood disorder admitted on 3/8/2022 due to mechanical fall and head laceration. CT C-spine showed pleural fluid filling the left lung apex. Chest x-ray showed moderate to marked left sided pleural fluid collection.         Status post mechanical fall  Empyema of left pleural space    Likely related to aspiration in the setting of poor dentition and recent fall with head injury     On 3/11/22, CTS performed thoracoscopy with lavage and cleanout left lower lobe and left upper lobe, Left thoracoscopy with conversion to thoracotomy due to longstanding marked empyema requiring complete decortication and complete parietal pleurectomy and placement of chest tubes.   Left pleural fluid culture grew Eikenella corrodens and fusobacterium nucleatum.   -Received IV vancomycin (3/10/22 -3/14/22)  -Continue IV Unasyn for now with plan to transition to Augmentin at discharge   -Chest tube management as per surgery service   -ID following, appreciate assistance        Head injury secondary to fall  Patient presented with mechanical fall from his group home.  CT scan of the head showed questionable subdural hygroma measuring up to 0.9 cm  versus prominence of subarachnoid space overlying the left parietal lobe.  CT C-spine  showed no acute fractures.   -Laceration on his head has been repaired in the ED.  -Fall precautions in place           Hypertension: bp remains slightly soft  -Continue to hold PTA amlodipine and atenolol       Mood disorder  -Continue PTA sertraline, ativan         DVT PPX: SCD           Jay Jay Stroud  "D.O.              -------------------------------------------------------------------------------------------------------------  SUBJECTIVE: NAD. Denies any nausea, vomiting, abdominal pain, chest pain, SOB, new swelling, fevers, chills, or headache.     Exam:  /68 (BP Location: Left arm)   Pulse 84   Temp 97.1  F (36.2  C) (Oral)   Resp 18   Ht 1.651 m (5' 5\")   Wt 53.5 kg (118 lb)   SpO2 96%   BMI 19.64 kg/m    General: NAD  RESPIRATORY: Breathing nonlabored  CARDIOVASCULAR: No le edema bilat.   ABDOMEN: soft and non-tender  NEUROLOGIC: Motor grossly intact, speech clear       Diagnostics Reviewed:      Recent Results (from the past 24 hour(s))   Glucose by meter    Collection Time: 03/14/22  9:20 AM   Result Value Ref Range    GLUCOSE BY METER POCT 120 (H) 70 - 99 mg/dL   Potassium    Collection Time: 03/14/22  1:13 PM   Result Value Ref Range    Potassium 3.5 3.5 - 5.0 mmol/L   Asymptomatic COVID-19 Virus (Coronavirus) by PCR Nasopharyngeal    Collection Time: 03/14/22  1:35 PM    Specimen: Nasopharyngeal; Swab   Result Value Ref Range    SARS CoV2 PCR Negative Negative   Glucose by meter    Collection Time: 03/14/22  6:22 PM   Result Value Ref Range    GLUCOSE BY METER POCT 125 (H) 70 - 99 mg/dL   Glucose by meter    Collection Time: 03/14/22  9:09 PM   Result Value Ref Range    GLUCOSE BY METER POCT 123 (H) 70 - 99 mg/dL   Creatinine    Collection Time: 03/15/22  6:05 AM   Result Value Ref Range    Creatinine 0.56 (L) 0.70 - 1.30 mg/dL    GFR Estimate >90 >60 mL/min/1.73m2   Potassium    Collection Time: 03/15/22  6:05 AM   Result Value Ref Range    Potassium 3.3 (L) 3.5 - 5.0 mmol/L       "

## 2022-03-15 NOTE — PLAN OF CARE
Patient is only oriented to self this shift. His chest tubes are in place. IV abx infused. Patient denies pain. Patient only had broth this shift.  No injuries noted from previous fall.  Problem: Plan of Care - These are the overarching goals to be used throughout the patient stay.    Goal: Absence of Hospital-Acquired Illness or Injury  Intervention: Identify and Manage Fall Risk  Recent Flowsheet Documentation  Taken 3/14/2022 1830 by Alona Interiano RN  Safety Promotion/Fall Prevention:   bed alarm on   chair alarm on  Intervention: Prevent Skin Injury  Recent Flowsheet Documentation  Taken 3/14/2022 1830 by Alona Interiano RN  Body Position: position changed independently  Taken 3/14/2022 1615 by Alona Interiano RN  Body Position: position changed independently  Intervention: Prevent and Manage VTE (Venous Thromboembolism) Risk  Recent Flowsheet Documentation  Taken 3/14/2022 1830 by Alona Interiano RN  VTE Prevention/Management: SCDs (sequential compression devices) on  Taken 3/14/2022 1615 by Alona Interiano RN  Activity Management: up to bedside commode  Goal: Optimal Comfort and Wellbeing  Intervention: Monitor Pain and Promote Comfort  Recent Flowsheet Documentation  Taken 3/14/2022 1615 by Alona Interiano RN  Pain Management Interventions: medication (see MAR)     Problem: Fall Injury Risk  Goal: Absence of Fall and Fall-Related Injury  Outcome: Ongoing, Not Progressing  Intervention: Identify and Manage Contributors  Recent Flowsheet Documentation  Taken 3/14/2022 1830 by Alona Interiano RN  Medication Review/Management: medications reviewed  Intervention: Promote Injury-Free Environment  Recent Flowsheet Documentation  Taken 3/14/2022 1830 by Alona Interiano RN  Safety Promotion/Fall Prevention:   bed alarm on   chair alarm on     Problem: Pain Acute  Goal: Acceptable Pain Control and Functional Ability  Outcome: Ongoing, Progressing  Intervention: Develop Pain Management Plan  Recent Flowsheet  Documentation  Taken 3/14/2022 1615 by Alona Interiano, RN  Pain Management Interventions: medication (see MAR)  Intervention: Prevent or Manage Pain  Recent Flowsheet Documentation  Taken 3/14/2022 1830 by Alona Interiano, RN  Medication Review/Management: medications reviewed     Problem: Infection  Goal: Absence of Infection Signs and Symptoms  Outcome: Ongoing, Progressing   Goal Outcome Evaluation:

## 2022-03-16 ENCOUNTER — APPOINTMENT (OUTPATIENT)
Dept: RADIOLOGY | Facility: HOSPITAL | Age: 77
DRG: 163 | End: 2022-03-16
Attending: SURGERY
Payer: MEDICARE

## 2022-03-16 ENCOUNTER — APPOINTMENT (OUTPATIENT)
Dept: PHYSICAL THERAPY | Facility: HOSPITAL | Age: 77
DRG: 163 | End: 2022-03-16
Payer: MEDICARE

## 2022-03-16 LAB
ABO/RH(D): NORMAL
ALBUMIN SERPL-MCNC: 1.2 G/DL (ref 3.5–5)
ANION GAP SERPL CALCULATED.3IONS-SCNC: 7 MMOL/L (ref 5–18)
ANTIBODY SCREEN: NEGATIVE
BLD PROD TYP BPU: NORMAL
BLOOD COMPONENT TYPE: NORMAL
BUN SERPL-MCNC: 8 MG/DL (ref 8–28)
CALCIUM SERPL-MCNC: 6.9 MG/DL (ref 8.5–10.5)
CHLORIDE BLD-SCNC: 108 MMOL/L (ref 98–107)
CO2 SERPL-SCNC: 24 MMOL/L (ref 22–31)
CODING SYSTEM: NORMAL
CREAT SERPL-MCNC: 0.53 MG/DL (ref 0.7–1.3)
CROSSMATCH: NORMAL
ERYTHROCYTE [DISTWIDTH] IN BLOOD BY AUTOMATED COUNT: 18.6 % (ref 10–15)
GFR SERPL CREATININE-BSD FRML MDRD: >90 ML/MIN/1.73M2
GLUCOSE BLD-MCNC: 103 MG/DL (ref 70–125)
GLUCOSE BLDC GLUCOMTR-MCNC: 98 MG/DL (ref 70–99)
HCT VFR BLD AUTO: 23.8 % (ref 40–53)
HGB BLD-MCNC: 6.9 G/DL (ref 13.3–17.7)
HGB BLD-MCNC: 8.7 G/DL (ref 13.3–17.7)
ISSUE DATE AND TIME: NORMAL
MCH RBC QN AUTO: 24.3 PG (ref 26.5–33)
MCHC RBC AUTO-ENTMCNC: 29 G/DL (ref 31.5–36.5)
MCV RBC AUTO: 84 FL (ref 78–100)
PLATELET # BLD AUTO: 570 10E3/UL (ref 150–450)
POTASSIUM BLD-SCNC: 3.4 MMOL/L (ref 3.5–5)
POTASSIUM BLD-SCNC: 4 MMOL/L (ref 3.5–5)
RBC # BLD AUTO: 2.84 10E6/UL (ref 4.4–5.9)
SODIUM SERPL-SCNC: 139 MMOL/L (ref 136–145)
SPECIMEN EXPIRATION DATE: NORMAL
UNIT ABO/RH: NORMAL
UNIT NUMBER: NORMAL
UNIT STATUS: NORMAL
UNIT TYPE ISBT: 6200
WBC # BLD AUTO: 11.2 10E3/UL (ref 4–11)

## 2022-03-16 PROCEDURE — 99233 SBSQ HOSP IP/OBS HIGH 50: CPT | Performed by: INTERNAL MEDICINE

## 2022-03-16 PROCEDURE — 250N000011 HC RX IP 250 OP 636

## 2022-03-16 PROCEDURE — 250N000013 HC RX MED GY IP 250 OP 250 PS 637: Performed by: SURGERY

## 2022-03-16 PROCEDURE — 80048 BASIC METABOLIC PNL TOTAL CA: CPT | Performed by: INTERNAL MEDICINE

## 2022-03-16 PROCEDURE — 85018 HEMOGLOBIN: CPT | Performed by: INTERNAL MEDICINE

## 2022-03-16 PROCEDURE — 86850 RBC ANTIBODY SCREEN: CPT | Performed by: INTERNAL MEDICINE

## 2022-03-16 PROCEDURE — 71045 X-RAY EXAM CHEST 1 VIEW: CPT

## 2022-03-16 PROCEDURE — 36415 COLL VENOUS BLD VENIPUNCTURE: CPT | Performed by: INTERNAL MEDICINE

## 2022-03-16 PROCEDURE — 250N000013 HC RX MED GY IP 250 OP 250 PS 637: Performed by: INTERNAL MEDICINE

## 2022-03-16 PROCEDURE — 99232 SBSQ HOSP IP/OBS MODERATE 35: CPT | Performed by: INTERNAL MEDICINE

## 2022-03-16 PROCEDURE — 86923 COMPATIBILITY TEST ELECTRIC: CPT | Performed by: INTERNAL MEDICINE

## 2022-03-16 PROCEDURE — 85027 COMPLETE CBC AUTOMATED: CPT | Performed by: INTERNAL MEDICINE

## 2022-03-16 PROCEDURE — 82040 ASSAY OF SERUM ALBUMIN: CPT | Performed by: INTERNAL MEDICINE

## 2022-03-16 PROCEDURE — 84132 ASSAY OF SERUM POTASSIUM: CPT | Performed by: INTERNAL MEDICINE

## 2022-03-16 PROCEDURE — 250N000013 HC RX MED GY IP 250 OP 250 PS 637: Performed by: HOSPITALIST

## 2022-03-16 PROCEDURE — 250N000011 HC RX IP 250 OP 636: Performed by: INTERNAL MEDICINE

## 2022-03-16 PROCEDURE — 120N000001 HC R&B MED SURG/OB

## 2022-03-16 PROCEDURE — 97110 THERAPEUTIC EXERCISES: CPT | Mod: GP

## 2022-03-16 PROCEDURE — P9016 RBC LEUKOCYTES REDUCED: HCPCS | Performed by: INTERNAL MEDICINE

## 2022-03-16 PROCEDURE — 86901 BLOOD TYPING SEROLOGIC RH(D): CPT | Performed by: INTERNAL MEDICINE

## 2022-03-16 RX ORDER — FUROSEMIDE 10 MG/ML
10 INJECTION INTRAMUSCULAR; INTRAVENOUS ONCE
Status: COMPLETED | OUTPATIENT
Start: 2022-03-16 | End: 2022-03-16

## 2022-03-16 RX ORDER — MULTIVITAMIN,THERAPEUTIC
1 TABLET ORAL DAILY
Status: DISCONTINUED | OUTPATIENT
Start: 2022-03-16 | End: 2022-03-21 | Stop reason: HOSPADM

## 2022-03-16 RX ADMIN — SERTRALINE HYDROCHLORIDE 25 MG: 25 TABLET ORAL at 09:19

## 2022-03-16 RX ADMIN — AMLODIPINE BESYLATE 5 MG: 5 TABLET ORAL at 09:21

## 2022-03-16 RX ADMIN — ACETAMINOPHEN 975 MG: 325 TABLET ORAL at 05:31

## 2022-03-16 RX ADMIN — LORAZEPAM 2 MG: 1 TABLET ORAL at 16:26

## 2022-03-16 RX ADMIN — DOCUSATE SODIUM 50 MG AND SENNOSIDES 8.6 MG 1 TABLET: 8.6; 5 TABLET, FILM COATED ORAL at 09:19

## 2022-03-16 RX ADMIN — ATENOLOL 25 MG: 25 TABLET ORAL at 09:21

## 2022-03-16 RX ADMIN — AMPICILLIN SODIUM AND SULBACTAM SODIUM 3 G: 2; 1 INJECTION, POWDER, FOR SOLUTION INTRAMUSCULAR; INTRAVENOUS at 21:39

## 2022-03-16 RX ADMIN — ACETAMINOPHEN 975 MG: 325 TABLET ORAL at 20:51

## 2022-03-16 RX ADMIN — FUROSEMIDE 10 MG: 10 INJECTION, SOLUTION INTRAMUSCULAR; INTRAVENOUS at 09:29

## 2022-03-16 RX ADMIN — THERA TABS 1 TABLET: TAB at 16:26

## 2022-03-16 RX ADMIN — AMPICILLIN SODIUM AND SULBACTAM SODIUM 3 G: 2; 1 INJECTION, POWDER, FOR SOLUTION INTRAMUSCULAR; INTRAVENOUS at 09:13

## 2022-03-16 RX ADMIN — DOCUSATE SODIUM 50 MG AND SENNOSIDES 8.6 MG 1 TABLET: 8.6; 5 TABLET, FILM COATED ORAL at 20:51

## 2022-03-16 RX ADMIN — ACETAMINOPHEN 975 MG: 325 TABLET ORAL at 16:26

## 2022-03-16 RX ADMIN — POLYETHYLENE GLYCOL 3350 17 G: 17 POWDER, FOR SOLUTION ORAL at 09:22

## 2022-03-16 RX ADMIN — AMPICILLIN SODIUM AND SULBACTAM SODIUM 3 G: 2; 1 INJECTION, POWDER, FOR SOLUTION INTRAMUSCULAR; INTRAVENOUS at 02:10

## 2022-03-16 RX ADMIN — AMPICILLIN SODIUM AND SULBACTAM SODIUM 3 G: 2; 1 INJECTION, POWDER, FOR SOLUTION INTRAMUSCULAR; INTRAVENOUS at 16:26

## 2022-03-16 ASSESSMENT — ACTIVITIES OF DAILY LIVING (ADL)
ADLS_ACUITY_SCORE: 13

## 2022-03-16 NOTE — PLAN OF CARE
Patient is confused per baseline. Chest tubes are draining very little. 10 ml from #3 and 20 ml from 1&3. Patient had moderate loose stool on commode. He also used urinal x1. He was incontinent of urine x1. Patient ate 100% of dinner.  Problem: Plan of Care - These are the overarching goals to be used throughout the patient stay.    Goal: Absence of Hospital-Acquired Illness or Injury  Intervention: Identify and Manage Fall Risk  Recent Flowsheet Documentation  Taken 3/15/2022 1730 by Alona Interiano RN  Safety Promotion/Fall Prevention:   activity supervised   bed alarm on   chair alarm on   clutter free environment maintained   fall prevention program maintained   increase visualization of patient   increased rounding and observation   nonskid shoes/slippers when out of bed  Intervention: Prevent Skin Injury  Recent Flowsheet Documentation  Taken 3/15/2022 2050 by Alona Interiano RN  Body Position: position changed independently  Taken 3/15/2022 1730 by Alona Interiano RN  Body Position: position changed independently  Taken 3/15/2022 1700 by Alona Interiano RN  Body Position: position changed independently  Intervention: Prevent and Manage VTE (Venous Thromboembolism) Risk  Recent Flowsheet Documentation  Taken 3/15/2022 2050 by Alona Interiano RN  Activity Management: up to bedside commode  Taken 3/15/2022 1730 by Alona Interiano RN  Activity Management: up to bedside commode  Taken 3/15/2022 1700 by Alona Interiano RN  VTE Prevention/Management: SCDs (sequential compression devices) off  Activity Management: up to bedside commode     Problem: Risk for Delirium  Goal: Improved Sleep  Outcome: Ongoing, Progressing     Problem: Fall Injury Risk  Goal: Absence of Fall and Fall-Related Injury  Outcome: Ongoing, Progressing  Intervention: Identify and Manage Contributors  Recent Flowsheet Documentation  Taken 3/15/2022 1730 by Alona Interiano RN  Medication Review/Management: medications reviewed  Intervention:  Promote Injury-Free Environment  Recent Flowsheet Documentation  Taken 3/15/2022 1730 by Alona Interiano, RN  Safety Promotion/Fall Prevention:   activity supervised   bed alarm on   chair alarm on   clutter free environment maintained   fall prevention program maintained   increase visualization of patient   increased rounding and observation   nonskid shoes/slippers when out of bed     Problem: Pain Acute  Goal: Acceptable Pain Control and Functional Ability  Outcome: Ongoing, Progressing  Intervention: Prevent or Manage Pain  Recent Flowsheet Documentation  Taken 3/15/2022 1730 by Alona Interiano, RN  Medication Review/Management: medications reviewed     Problem: Infection  Goal: Absence of Infection Signs and Symptoms  Outcome: Ongoing, Progressing     Problem: Urinary Elimination Management  Goal: Effective Urinary Elimination/Continence  Outcome: Ongoing, Progressing   Goal Outcome Evaluation:

## 2022-03-16 NOTE — PROGRESS NOTES
Progress Note    Assessment/Plan  Continue chest tubes today.  X-ray is somewhat improved.  We will probably remove them tomorrow.  No air leak noted.    Active Problems:    Pleural effusion    Fall    Fall, initial encounter    Traumatic head injury with multiple lacerations, initial encounter    Empyema of left pleural space (H)    Poor dentition      Subjective  Comfortable  Objective    Vital signs in last 24 hours  Temp:  [96.9  F (36.1  C)-98.3  F (36.8  C)] 96.9  F (36.1  C)  Pulse:  [] 90  Resp:  [18-20] 18  BP: (102-185)/(53-96) 111/65  SpO2:  [94 %-100 %] 94 %  Weight:   [unfilled]    Intake/Output last 3 shifts  I/O last 3 completed shifts:  In: 480 [P.O.:480]  Out: 680 [Urine:550; Chest Tube:130]  Intake/Output this shift:  No intake/output data recorded.      Physical Exam  No air leak good respiratory effort minimal output    Pertinent Labs   Lab Results   Component Value Date    WBC 11.2 (H) 03/16/2022    HGB 6.9 (LL) 03/16/2022    HCT 23.8 (L) 03/16/2022    MCV 84 03/16/2022     (H) 03/16/2022             Pertinent Radiology     [unfilled]        Perry Martell MD

## 2022-03-16 NOTE — PROGRESS NOTES
[facility] – [place] [facility] Medicine Progress Note      ASSESSMENT:    Active Problems:    Pleural effusion    Fall    Fall, initial encounter    Traumatic head injury with multiple lacerations, initial encounter    Empyema of left pleural space (H)    Poor dentition      PLAN:   76 year old male from group home with history of hypertension, Intellectual disability, hypertension, OCD, anxiety disorder and mood disorder admitted on 3/8/2022 due to mechanical fall and head laceration. CT C-spine showed pleural fluid filling the left lung apex. Chest x-ray showed moderate to marked left sided pleural fluid collection.           Empyema of left pleural space: likely related to aspiration in the setting of poor dentition and recent fall with head injury   On 3/11/22, CTS performed thoracoscopy with lavage and cleanout left lower lobe and left upper lobe, Left thoracoscopy with conversion to thoracotomy due to longstanding marked empyema requiring complete decortication and complete parietal pleurectomy and placement of chest tubes.   Left pleural fluid culture grew Eikenella corrodens and fusobacterium nucleatum.   -Received IV vancomycin (3/10/22 -3/14/22)  -Continue IV Unasyn for now with plan to transition to Augmentin at discharge   -Chest tube management as per surgery service   -ID following, appreciate assistance   - CXR with pulmonary vascular congestion, will give 1 dose IV lasix since patient will also receive PRBC 3/16       Head injury secondary to fall:  Patient presented with mechanical fall from his group home.  CT scan of the head showed questionable subdural hygroma measuring up to 0.9 cm  versus prominence of subarachnoid space overlying the left parietal lobe.  CT C-spine  showed no acute fractures.   -Laceration on his head has been repaired in the ED.  -Fall precautions in place        Anemia: suspect due to ongoing illness, phlebotomy  -- transfuse 1 U PRBC 3/16 and recheck hgb in AM       Hypocalcemia: check  "albumin to confirm, replace if needed        Hypertension: bp remains slightly soft  -Continue home amlodipine and atenolol as BP allows        Mood disorder:  -Continue PTA sertraline, ativan      Severe malnutrition:  --RD to follow  --Given psychiatry history would avoid appetite stimulant as inpatient, recommend outpatient RD and psychiatry referrals         DVT PPX: SCD           Jay Jay LA NENAJulien Stroud D.O.              -------------------------------------------------------------------------------------------------------------  SUBJECTIVE: NAD. Denies any nausea, vomiting, abdominal pain, chest pain, SOB, new swelling, fevers, chills, or headache.     Exam:  /65 (BP Location: Right arm)   Pulse 90   Temp 96.9  F (36.1  C) (Oral)   Resp 18   Ht 1.651 m (5' 5\")   Wt 53.5 kg (118 lb)   SpO2 94%   BMI 19.64 kg/m    General: NAD  RESPIRATORY: Breathing nonlabored  CARDIOVASCULAR: trace pitting le edema bilat.   ABDOMEN: soft and non-tender  NEUROLOGIC: Motor grossly intact, speech clear       Diagnostics Reviewed:      Recent Results (from the past 24 hour(s))   Potassium    Collection Time: 03/15/22 11:53 AM   Result Value Ref Range    Potassium 3.8 3.5 - 5.0 mmol/L   Glucose by meter    Collection Time: 03/15/22  9:37 PM   Result Value Ref Range    GLUCOSE BY METER POCT 150 (H) 70 - 99 mg/dL   CBC with platelets    Collection Time: 03/16/22  6:54 AM   Result Value Ref Range    WBC Count 11.2 (H) 4.0 - 11.0 10e3/uL    RBC Count 2.84 (L) 4.40 - 5.90 10e6/uL    Hemoglobin 6.9 (LL) 13.3 - 17.7 g/dL    Hematocrit 23.8 (L) 40.0 - 53.0 %    MCV 84 78 - 100 fL    MCH 24.3 (L) 26.5 - 33.0 pg    MCHC 29.0 (L) 31.5 - 36.5 g/dL    RDW 18.6 (H) 10.0 - 15.0 %    Platelet Count 570 (H) 150 - 450 10e3/uL   Basic metabolic panel    Collection Time: 03/16/22  6:54 AM   Result Value Ref Range    Sodium 139 136 - 145 mmol/L    Potassium 3.4 (L) 3.5 - 5.0 mmol/L    Chloride 108 (H) 98 - 107 mmol/L    Carbon Dioxide (CO2) 24 " 22 - 31 mmol/L    Anion Gap 7 5 - 18 mmol/L    Urea Nitrogen 8 8 - 28 mg/dL    Creatinine 0.53 (L) 0.70 - 1.30 mg/dL    Calcium 6.9 (L) 8.5 - 10.5 mg/dL    Glucose 103 70 - 125 mg/dL    GFR Estimate >90 >60 mL/min/1.73m2   Adult Type and Screen    Collection Time: 03/16/22  6:54 AM   Result Value Ref Range    ABO/RH(D) A POS     Antibody Screen Negative Negative    SPECIMEN EXPIRATION DATE [id number]    Glucose by meter    Collection Time: 03/16/22  8:14 AM   Result Value Ref Range    GLUCOSE BY METER POCT 98 70 - 99 mg/dL   Prepare red blood cells (unit)    Collection Time: 03/16/22  8:15 AM   Result Value Ref Range    CROSSMATCH Compatible     UNIT ABO/RH A Pos     Unit Number [id number]     Unit Status Ready     Blood Component Type Red Blood Cells     Product Code [id number]     CODING SYSTEM [id number]     UNIT TYPE ISBT 6200

## 2022-03-16 NOTE — PROGRESS NOTES
Infectious Disease Progress Note    Assessment/Plan   Left pleural empyema with Eikenella, fusobacteria and possibly also some streptococcoid organism(s), not totally final yet.   No staph  Patient has poor dentition and a recent fall and head injury, so suspect oral anaerobes.    Significant complex and loculated empyema on CT.     PROCEDURE DATE:  3/8/2022 - 3/11/2022    PROCEDURE: 1.  Thoracoscopy with lavage and cleanout left lower lobe and left upper lobe 2.  Left thoracoscopy with initial dissection with need for conversion to thoracotomy for longstanding marked empyema and thick peel 3.  Left thoracotomy complete decortication and complete parietal pleurectomy placement of 336 [place] chest tubes    COVID 2021    CRP 18  Leukocytosis improved     Recommendations:     Keep UNASYN    PO abx reasonable at discharge  eg PIO Lazaro M.D.      Subjective  Stable.   He feels good  Ate breakfast        Objective    Vital signs in last 24 hours  Temp:  [96.9  F (36.1  C)-98.3  F (36.8  C)] 97.9  F (36.6  C)  Pulse:  [] 88  Resp:  [18-20] 20  BP: (102-148)/(53-75) 115/70  SpO2:  [94 %-100 %] 94 %  Wt Readings from Last 3 Encounters:   03/10/22 53.5 kg (118 lb)           Intake/Output last 3 shifts  I/O last 3 completed shifts:  In: 480 [P.O.:480]  Out: 680 [Urine:550; Chest Tube:130]  Intake/Output this shift:  No intake/output data recorded.    Review of Systems   Pertinent items are noted in HPI., otherwise negative.    Physical Exam  General appearance: alert, appears stated age and cooperative  Head: laceration noted on top of skull  Eyes: EOMI, no icterus  Mouth:  Poor dentition  Neck: no adenopathy and supple, symmetrical, trachea midline  Lungs: decreased breath sounds on L side of chest  Heart: RRR, no murmur  Abdomen: soft, non-tender; bowel sounds normal; no masses,  no organomegaly  Extremities: warm and dry  Skin: Skin color, texture, turgor normal. No rashes or lesions  Neurologic:  Mental status: alert and cooperative.  somewhat repetitive       Pertinent Labs   Lab Results: personally reviewed.     Recent Labs   Lab 03/16/22  0654 03/12/22  0534 03/11/22 2015   WBC 11.2* 18.1* 23.7*   HGB 6.9* 8.6* 7.2*   HCT 23.8* 29.5* 25.3*   * 574* 541*        Recent Labs   Lab 03/16/22  0654 03/12/22  0534 03/10/22  1431    137 139   CO2 24 20* 24   BUN 8 8 10     No results found for: CULT  Collected Updated Procedure Result Status    03/09/2022 1759 03/10/2022 1335 Pleural fluid Aerobic Bacterial Culture Routine with Gram Stain [[id number]]   (Abnormal)   Pleural fluid from Pleural Cavity, Left    Preliminary result Component Value   Culture No growth, less than 1 day P   Gram Stain Result 4+ Gram positive cocci Abnormal  P    4+ WBC seen Abnormal  P    Predominantly PMNs           10/16/2021 1011 03/08/2022 1032 COVID-19 VIRUS (CORONAVIRUS) BY PCR (EXTERNAL RESULT) Final result Component Value   No component results           03/22/2021 1056 03/08/2022 1032 COVID-19 VIRUS (CORONAVIRUS) BY PCR (EXTERNAL RESULT) [[id number]]    Swab    Final result Component Value   COVID-19 Virus by PCR (External Result) Not Detected          Pertinent Radiology   Radiology Results:   XR Chest 1 View    Result Date: 3/9/2022  EXAM: XR CHEST 1 VIEW LOCATION: [facility] DATE/TIME: 3/9/2022 2:25 PM INDICATION: Question pleural effusion. COMPARISON: None.     IMPRESSION: Heart and mediastinal size within normal limits. There is a moderate left pleural effusion. Associated compressive atelectasis is present at the left lung base with concurrent infectious process not excluded. The right lung is clear. There is  minimal amount of scarring versus atelectasis at the lateral right lung base. No pneumothorax.    US Thoracentesis    Result Date: 3/9/2022  EXAM: 1. LEFT THORACENTESIS 2. ULTRASOUND GUIDANCE LOCATION: [facility] DATE/TIME: 3/9/2022 5:05 PM  INDICATION: Pleural effusion. PROCEDURE: Informed consent obtained. Time out performed. The chest was prepped and draped in sterile fashion. 5 mL of 1 % lidocaine was infused into the local soft tissues. Under direct ultrasound guidance, a 5 Lao catheter system was placed into the  pleural effusion. 0.1 liters of foamy yellow material was removed and sent to lab. The foamy quality was very atypical. Patient tolerated procedure well. Ultrasound imaging was obtained and placed in the patient's permanent medical record.     IMPRESSION: Status post left ultrasound-guided thoracentesis. Reference CPT Code: 43181    XR Chest Port 1 View    Result Date: 3/16/2022  EXAM: XR CHEST PORT 1 VIEW LOCATION: [facility] DATE/TIME: 3/16/2022 6:07 AM INDICATION: Indwelling chest tubes. Evaluate for pneumothorax. COMPARISON: Multiple prior chest regressed the most recent from 3/15/2022 and 3/14/2022     IMPRESSION: Stable left-sided chest tubes. No pneumothorax. Interval decrease in left-sided pleural fluid and atelectasis. Minimal right basilar pleural fluid and atelectasis. Mild cardiac enlargement. Mild pulmonary vascular congestion.    XR Chest Port 1 View    Result Date: 3/15/2022  EXAM: XR CHEST PORT 1 VIEW LOCATION: [facility] DATE/TIME: 3/15/2022 5:47 AM INDICATION: Indwelling chest tube tip. Follow-up assessment. COMPARISON: 03/14/2022     IMPRESSION: Stable positioning of 3 left-sided chest tubes. Minimal interval increase in pleural fluid and atelectasis in the left perihilar region/left upper lung with minimal residual left upper lung aeration remaining. Left apical pneumothorax visualized. Resolution of the right apical pneumothorax. Minimal right basilar pleural fluid. Mild cardiac enlargement. Normal pulmonary vascularity.    XR Chest Port 1 View    Result Date: 3/14/2022  EXAM: XR CHEST PORT 1 VIEW LOCATION: [facility] DATE/TIME: 3/14/2022  6:15 AM INDICATION: Left-sided chest tubes and right-sided pneumothorax. Follow-up assessment. COMPARISON: Multiple prior studies with most recent from 03/13/2022     IMPRESSION: Interval decrease in right apical pneumothorax with a decrease in the separation of the visceral parietal pleura which no measures 1.1 cm, previously 2 cm. Multiple left-sided chest tube stable position. Minimal left apical pneumothorax. Pleural fluid and atelectasis or infiltrate in the left perihilar region left lung base. Minimal right basilar pleural fluid with infiltrate or atelectasis. Cardiac enlargement.    XR Chest Port 1 View    Result Date: 3/13/2022  EXAM: XR CHEST PORT 1 VIEW LOCATION: [facility] DATE/TIME: 3/13/2022 5:57 AM INDICATION: Follow-up right-sided pneumothorax and indwelling left-sided chest tubes COMPARISON: 03/12/2021     IMPRESSION: Stable positioning of multiple left-sided chest tubes. No left-sided pneumothorax. Interval increase in volume loss and pleural fluid left lung base. Infiltrate and pleural fluid right lung base unchanged. Right apical pneumothorax at 2 cm, previously 1.5 cm.    XR Chest Port 1 View    Result Date: 3/12/2022  EXAM: XR CHEST PORT 1 VIEW LOCATION: [facility] DATE/TIME: 3/12/2022 5:56 AM INDICATION: Follow-up chest tube placement and pneumothorax. COMPARISON: Chest radiograph 03/09/2022, ultrasound-guided thoracentesis 03/09/2022, CT 03/10/2022     IMPRESSION: New minimal right apical pneumothorax. Separation of visceral and parietal pleura by 1.5 cm. Minimal right basilar pleural fluid with atelectasis. 3 left-sided chest tubes in place. No definitive evidence for left-sided pneumothorax. Marked subcutaneous emphysema left lateral chest wall. Atelectasis or infiltrate left lung base. Mild cardiac enlargement. Normal pulmonary vascularity. [Critical Result: New pneumothorax] Patient's nurse was contacted by me on 3/12/2022 7:00 AM and  verbalized understanding of the critical result.     CT Chest w/o Contrast    Result Date: 3/10/2022  EXAM: CT CHEST W/O CONTRAST LOCATION: [facility] DATE/TIME: 3/10/2022 2:07 PM INDICATION: Evaluate pleural effusion COMPARISON: Portable AP view of the chest 03/09/2022 TECHNIQUE: CT chest without IV contrast. Multiplanar reformats were obtained. Dose reduction techniques were used. CONTRAST: None. FINDINGS: LUNGS AND PLEURA: There is a loculated fluid collection in the posterior left pleural space with a circumscribed slightly higher attenuation rim that measures around 4 cm AP and is 16 cm craniocaudal. The loculated area has small foci of suspended air likely from the recent thoracentesis and indicates the fluid is complex. There is separate free fluid which layers into the left posterior costophrenic sulcus external to the focal loculation. Complete atelectasis of the left upper lobe along the mediastinal pleura. There is also multisegment atelectasis in the basilar segments of the left lower lobe. Within the atelectatic territories there are foci of high attenuation/calcification. There are bands of atelectasis in the right lower lobe, inferior right middle lobe, inferior right upper lobe. Adjacent to the atelectatic bands are also indistinct nodular opacities particularly in the middle and upper lobes, not well characterized due to motion-related blurring but likely reflecting a component of acute inflammation. The trachea and central airways are patent. No visible intraluminal material in the larger medium airways. MEDIASTINUM: Moderate hiatal hernia. Patulous esophagus which contains both air and fluid to near the level of the thoracic inlet. Cardiac chambers are normal in size. No pericardial effusion. Normal caliber thoracic aorta. CORONARY ARTERY CALCIFICATION: None. UPPER ABDOMEN: There are peripherally calcified stones in the lumen of the gallbladder. No gallbladder wall  thickening or pericholecystic edema. MUSCULOSKELETAL: Minimal thoracic degenerative osteophytes. No aggressive or destructive bone lesions.     IMPRESSION: 1.  [place] loculated pleural fluid collection in the posterior left chest with circumscribed surrounding peel consistent with empyema. Separate smaller amount of free fluid layering into the posterior sulcus. 2.  Lobar atelectasis of the left upper lobe compressed against the mediastinal pleura. Multisegment atelectasis in the basal left lower lobe. Consider surgical consultation given constellation of #1 and #2. 3.  Hiatal hernia and patulous esophagus, which can predispose to aspiration events. 4.  Nodular opacities associated with bands of atelectasis in the right middle and upper lobes and could relate to aspiration. 5.  Cholelithiasis.

## 2022-03-16 NOTE — PROGRESS NOTES
"CLINICAL NUTRITION SERVICES - ASSESSMENT NOTE     Nutrition Prescription    RECOMMENDATIONS FOR MDs/PROVIDERS TO ORDER:  Consider appetite stimulant such as remeron  Consider psych consult  Recommend mvi with minerals    Malnutrition Status:    Severe in the context of social and environmental circumstances    Recommendations already ordered by Registered Dietitian (RD):  Gel plus daily  Ensure clear tid   New weight    Future/Additional Recommendations:  Adjust supplements pending intake/acceptance     REASON FOR ASSESSMENT  Perry Betancourt is a/an 76 year old male assessed by the dietitian for [facility]    Pt presents with Pleural effusion, TBI s/p fall, empyema  Hx HTN, OCD, anxiety, intellectual disability, mood disorder    NUTRITION HISTORY  From  - has been declining per , needing more assist with ADLs, walking less, decreased appetite and losing weight    At baseline does not eat breakfast. Past few months would eat soup and sandwich at work and 25-35% of supper or skipped supper  Pt does not like milk type products. Likes pepsi and gatorade    CURRENT NUTRITION ORDERS  Diet: Regular - advanced from full liquids this am.   NPO/CL 3/11. Full liquids since 3/12    Intake/Tolerance: Variable, %, most </= 50% of the meals that are documented. Ate 50% of upgraded diet lunch today    Pt reports his pizza today was \"fine\" He states his appetite is good and eating well at his . Seems anxious-likely d/t being out of his stable environment    LABS  Labs reviewed  K+ 3.4, decreased-being replaced  Alb 1.2, decreased    MEDICATIONS  Medications reviewed  Iv abx, miralax daily, pericolace bid    ANTHROPOMETRICS  Height: 165.1 cm (5' 5\")  Most Recent Weight: 53.5 kg (118 lb) 3/10  16.9% weight loss x 5 months  IBW: 61.8 kg  BMI: Normal  Weight History:   142 lb 10/18/21  142 lb 9/15/21  142 lb 3/23/21    Dosing Weight: 53.5 kg    ASSESSED NUTRITION NEEDS  Estimated Energy Needs: 4380-4852 kcals/day (25 - 30 " kcals/kg)  Justification: Maintenance  Estimated Protein Needs: 64-80 grams protein/day (1.2 - 1.5 grams of pro/kg)  Justification: Increased needs  Estimated Fluid Needs: 5555-5037 mL/day (25 - 30 mL/kg)   Justification: Maintenance    PHYSICAL FINDINGS  See malnutrition section below.  Chest tube x 3    GI  BM yesterday  Normoactive bowel sounds    MALNUTRITION:  % Weight Loss:  > 10% in 6 months (severe malnutrition)  % Intake:  </= 75% for >3 months (severe malnutrition)  Subcutaneous Fat Loss:  Orbital region moderate depletion  Muscle Loss:  Temporal region moderate depletion  Fluid Retention:  None noted  Did not preform full physical assessment as pt was anxious    Malnutrition Diagnosis: Severe malnutrition  In Context of:  Environmental or social circumstances    NUTRITION DIAGNOSIS  Malnutrition related to cognitive decline as evidenced by 16.9% weight loss in 5 months, intake < 75% > 3 months     INTERVENTIONS  Implementation  Medical food supplement therapy  Multivitamin/mineral supplement therapy   Consider appetite stimulant such as remeron  Consider psych consult  Ordered new weight    Goals  Intake > 75% of estimated needs  No weight loss below 118 lb      Monitoring/Evaluation  Progress toward goals will be monitored and evaluated per protocol.

## 2022-03-16 NOTE — PROGRESS NOTES
Patient alert, forgetful. Stated he wants to go home. Chest tube dressings intact, no new output this shift. Patient denies pain. Taking fluids and eating well at supper, improved from the day shift. Patient continent, asks for urinal. Patient received one unit of packed rbc's for 6.9 hgb. Updated Osiris from his group home.

## 2022-03-16 NOTE — PROGRESS NOTES
Care Management Follow Up    Length of Stay (days): 8    Expected Discharge Date: 03/17/2022     Concerns to be Addressed:  Chest tube, IV abx, IV lasix, transfusion PRBC's, ID and Surgery following     Patient plan of care discussed at interdisciplinary rounds: Yes    Anticipated Discharge Disposition: Transitional Care     Anticipated Discharge Services:  Skilled nursing, therapy services  Anticipated Discharge DME:  To be determined    Patient/family educated on Medicare website which has current facility and service quality ratings:  yes  Education Provided on the Discharge Plan:  Yes, per care team  Patient/Family in Agreement with the Plan: yes    Referrals Placed by CM/SW: Post Acute Facilities  Private pay costs discussed: Not applicable    Additional Information:  Chart review completed.  Patient is not medically ready for discharge.    Therapy recommendations for discharge = transitional care unit (TCU).  Updated by bedside RN need for TCU.    RNCM called group home caregiver,  Osiris ([phone]), who states that she visited patient last evening and observed that patient is not at baseline for return to group home.  She states patient is independent with mobility at baseline.  He goes to Day Program Monday through Friday except Wednesdays.  Osiris reports concern that patient is very deconditioned and needs TCU before returning to group home.  Writer inquired if [facility] has legal guardian paperwork and requested it to be faxed.  Osiris states she will ask group home  to fax documents to [phone].  Osiris states that patient's brother, Gaurav, is main contact and legal guardian.     RNCM called patient's brother, Gaurav ([phone]), to introduce self and discuss discharge planning.  Writer requested legal guardian paperwork but Gaurav requested CM to obtain it from [facility].  Discussed discharge recommendations for TCU with Gaurav and offered choices.  Gaurav states he is agreeable with  "whatever the group home staff feel is best.  He states, \"They work with Armen and know his capabilities better than I do\".  TCU referrals sent to [facility], [facility], and [facility].      Mercy Rutherford RN      "

## 2022-03-17 ENCOUNTER — APPOINTMENT (OUTPATIENT)
Dept: RADIOLOGY | Facility: HOSPITAL | Age: 77
DRG: 163 | End: 2022-03-17
Attending: SURGERY
Payer: MEDICARE

## 2022-03-17 LAB
BACTERIA ABSC ANAEROBE+AEROBE CULT: ABNORMAL
CREAT SERPL-MCNC: 0.51 MG/DL (ref 0.7–1.3)
GFR SERPL CREATININE-BSD FRML MDRD: >90 ML/MIN/1.73M2
GLUCOSE BLDC GLUCOMTR-MCNC: 103 MG/DL (ref 70–99)
GLUCOSE BLDC GLUCOMTR-MCNC: 106 MG/DL (ref 70–99)
HGB BLD-MCNC: 9.1 G/DL (ref 13.3–17.7)
POTASSIUM BLD-SCNC: 3.4 MMOL/L (ref 3.5–5)
POTASSIUM BLD-SCNC: 3.5 MMOL/L (ref 3.5–5)

## 2022-03-17 PROCEDURE — 85018 HEMOGLOBIN: CPT | Performed by: INTERNAL MEDICINE

## 2022-03-17 PROCEDURE — 36415 COLL VENOUS BLD VENIPUNCTURE: CPT | Performed by: INTERNAL MEDICINE

## 2022-03-17 PROCEDURE — 120N000001 HC R&B MED SURG/OB

## 2022-03-17 PROCEDURE — 250N000013 HC RX MED GY IP 250 OP 250 PS 637: Performed by: INTERNAL MEDICINE

## 2022-03-17 PROCEDURE — 71045 X-RAY EXAM CHEST 1 VIEW: CPT

## 2022-03-17 PROCEDURE — 250N000011 HC RX IP 250 OP 636: Performed by: INTERNAL MEDICINE

## 2022-03-17 PROCEDURE — 250N000011 HC RX IP 250 OP 636

## 2022-03-17 PROCEDURE — 84132 ASSAY OF SERUM POTASSIUM: CPT | Performed by: INTERNAL MEDICINE

## 2022-03-17 PROCEDURE — 82565 ASSAY OF CREATININE: CPT | Performed by: INTERNAL MEDICINE

## 2022-03-17 PROCEDURE — 99232 SBSQ HOSP IP/OBS MODERATE 35: CPT | Performed by: INTERNAL MEDICINE

## 2022-03-17 PROCEDURE — 250N000013 HC RX MED GY IP 250 OP 250 PS 637: Performed by: HOSPITALIST

## 2022-03-17 PROCEDURE — 250N000013 HC RX MED GY IP 250 OP 250 PS 637: Performed by: SURGERY

## 2022-03-17 RX ORDER — POTASSIUM CHLORIDE 1500 MG/1
20 TABLET, EXTENDED RELEASE ORAL ONCE
Status: COMPLETED | OUTPATIENT
Start: 2022-03-17 | End: 2022-03-17

## 2022-03-17 RX ORDER — FUROSEMIDE 10 MG/ML
20 INJECTION INTRAMUSCULAR; INTRAVENOUS ONCE
Status: COMPLETED | OUTPATIENT
Start: 2022-03-17 | End: 2022-03-17

## 2022-03-17 RX ADMIN — LORAZEPAM 2 MG: 1 TABLET ORAL at 16:20

## 2022-03-17 RX ADMIN — SERTRALINE HYDROCHLORIDE 25 MG: 25 TABLET ORAL at 09:09

## 2022-03-17 RX ADMIN — POLYETHYLENE GLYCOL 3350 17 G: 17 POWDER, FOR SOLUTION ORAL at 09:09

## 2022-03-17 RX ADMIN — DOCUSATE SODIUM 50 MG AND SENNOSIDES 8.6 MG 2 TABLET: 8.6; 5 TABLET, FILM COATED ORAL at 21:13

## 2022-03-17 RX ADMIN — THERA TABS 1 TABLET: TAB at 09:10

## 2022-03-17 RX ADMIN — ACETAMINOPHEN 975 MG: 325 TABLET ORAL at 21:13

## 2022-03-17 RX ADMIN — DOCUSATE SODIUM 50 MG AND SENNOSIDES 8.6 MG 1 TABLET: 8.6; 5 TABLET, FILM COATED ORAL at 09:09

## 2022-03-17 RX ADMIN — AMPICILLIN SODIUM AND SULBACTAM SODIUM 3 G: 2; 1 INJECTION, POWDER, FOR SOLUTION INTRAMUSCULAR; INTRAVENOUS at 15:04

## 2022-03-17 RX ADMIN — AMLODIPINE BESYLATE 5 MG: 5 TABLET ORAL at 09:09

## 2022-03-17 RX ADMIN — ATENOLOL 25 MG: 25 TABLET ORAL at 09:09

## 2022-03-17 RX ADMIN — ACETAMINOPHEN 975 MG: 325 TABLET ORAL at 11:57

## 2022-03-17 RX ADMIN — POTASSIUM CHLORIDE 20 MEQ: 1500 TABLET, EXTENDED RELEASE ORAL at 11:57

## 2022-03-17 RX ADMIN — FUROSEMIDE 20 MG: 10 INJECTION, SOLUTION INTRAMUSCULAR; INTRAVENOUS at 11:56

## 2022-03-17 RX ADMIN — AMPICILLIN SODIUM AND SULBACTAM SODIUM 3 G: 2; 1 INJECTION, POWDER, FOR SOLUTION INTRAMUSCULAR; INTRAVENOUS at 21:17

## 2022-03-17 RX ADMIN — ACETAMINOPHEN 975 MG: 325 TABLET ORAL at 04:12

## 2022-03-17 RX ADMIN — AMPICILLIN SODIUM AND SULBACTAM SODIUM 3 G: 2; 1 INJECTION, POWDER, FOR SOLUTION INTRAMUSCULAR; INTRAVENOUS at 04:13

## 2022-03-17 RX ADMIN — POTASSIUM CHLORIDE 20 MEQ: 1500 TABLET, EXTENDED RELEASE ORAL at 09:09

## 2022-03-17 RX ADMIN — AMPICILLIN SODIUM AND SULBACTAM SODIUM 3 G: 2; 1 INJECTION, POWDER, FOR SOLUTION INTRAMUSCULAR; INTRAVENOUS at 10:37

## 2022-03-17 ASSESSMENT — ACTIVITIES OF DAILY LIVING (ADL)
ADLS_ACUITY_SCORE: 13
ADLS_ACUITY_SCORE: 15
ADLS_ACUITY_SCORE: 13
ADLS_ACUITY_SCORE: 13
ADLS_ACUITY_SCORE: 15
ADLS_ACUITY_SCORE: 13
ADLS_ACUITY_SCORE: 15
ADLS_ACUITY_SCORE: 13
ADLS_ACUITY_SCORE: 15
ADLS_ACUITY_SCORE: 13
ADLS_ACUITY_SCORE: 15
ADLS_ACUITY_SCORE: 13
ADLS_ACUITY_SCORE: 15
ADLS_ACUITY_SCORE: 13
ADLS_ACUITY_SCORE: 15
ADLS_ACUITY_SCORE: 13
ADLS_ACUITY_SCORE: 15
ADLS_ACUITY_SCORE: 13
ADLS_ACUITY_SCORE: 13

## 2022-03-17 NOTE — PROGRESS NOTES
Chest tubes removed by Dr. Martell. Patient tolerated well. Dressing changed, some serosang. Drainage--applied abd pad and pressure tape. Patient up in chair, states he feels well.

## 2022-03-17 NOTE — PROGRESS NOTES
Patient had unwitnessed fall, slid from chair to floor. Appears to be uninjured, did not hit his head. Will monitor. Seen by physician. Back in bed resting comfortably, bed alarm on.

## 2022-03-17 NOTE — PROGRESS NOTES
Received request to complete PAS for discharge planning; [id number] triggered a level II review.    Yumi Wan RN

## 2022-03-17 NOTE — PROGRESS NOTES
[facility] Note      ASSESSMENT:    Active Problems:    Pleural effusion    Fall    Fall, initial encounter    Traumatic head injury with multiple lacerations, initial encounter    Empyema of left pleural space (H)    Poor dentition      PLAN:   76 year old male from group home with history of hypertension, Intellectual disability, hypertension, OCD, anxiety disorder and mood disorder admitted on 3/8/2022 due to mechanical fall and head laceration. CT C-spine showed pleural fluid filling the left lung apex. Chest x-ray showed moderate to marked left sided pleural fluid collection.           Empyema of left pleural space: likely related to aspiration in the setting of poor dentition and recent fall with head injury   On 3/11/22, CTS performed thoracoscopy with lavage and cleanout left lower lobe and left upper lobe, Left thoracoscopy with conversion to thoracotomy due to longstanding marked empyema requiring complete decortication and complete parietal pleurectomy and placement of chest tubes.   Left pleural fluid culture grew Eikenella corrodens and fusobacterium nucleatum.   -Received IV vancomycin (3/10/22 -3/14/22)  -Continue IV Unasyn for now with plan to transition to Augmentin for 2 weeks at discharge   -Chest tubes removed 3/17/22  - CXR with ongoing pulmonary vascular congestion, will give additional dose IV lasix       Head injury secondary to fall:  Patient presented with mechanical fall from his group home.  CT scan of the head showed questionable subdural hygroma measuring up to 0.9 cm  versus prominence of subarachnoid space overlying the left parietal lobe.  CT C-spine  showed no acute fractures.   -Laceration on his head has been repaired in the ED.  -Fall precautions in place        Anemia: suspect due to ongoing illness, phlebotomy. RN notes black stools but this has been ongoing, no signs of hemodynamic instability or clnical concern for GI bleed with rising Hgb  --  "transfused 1 U PRBC 3/16 and will recheck again in AM for stability          Hypertension:    -Continue home amlodipine and atenolol as BP allows        Mood disorder:  -Continue PTA sertraline, ativan      Severe malnutrition:  --RD to follow  --Given psychiatry history would avoid appetite stimulant as inpatient, recommend outpatient RD and psychiatry referrals         DVT PPX: SCD           Jay [place] LA NENAJulien Stroud D.O.              -------------------------------------------------------------------------------------------------------------  SUBJECTIVE: NAD. Denies any nausea, vomiting, abdominal pain, chest pain, SOB, new swelling, fevers, chills, or headache.     Exam:  BP (!) 146/76 (BP Location: Right arm)   Pulse 65   Temp 98  F (36.7  C) (Oral)   Resp 18   Ht 1.651 m (5' 5\")   Wt 53.5 kg (118 lb)   SpO2 96%   BMI 19.64 kg/m    General: NAD  RESPIRATORY: Breathing nonlabored  CARDIOVASCULAR: trace le edema bilat.   ABDOMEN: soft and non-tender  NEUROLOGIC: Motor grossly intact, speech clear       Diagnostics Reviewed:      Recent Results (from the past 24 hour(s))   Hemoglobin    Collection Time: 03/16/22  3:19 PM   Result Value Ref Range    Hemoglobin 8.7 (L) 13.3 - 17.7 g/dL   Potassium    Collection Time: 03/16/22  7:56 PM   Result Value Ref Range    Potassium 4.0 3.5 - 5.0 mmol/L   Glucose by meter    Collection Time: 03/16/22  8:58 PM   Result Value Ref Range    GLUCOSE BY METER POCT 103 (H) 70 - 99 mg/dL   Creatinine    Collection Time: 03/17/22  6:24 AM   Result Value Ref Range    Creatinine 0.51 (L) 0.70 - 1.30 mg/dL    GFR Estimate >90 >60 mL/min/1.73m2   Hemoglobin    Collection Time: 03/17/22  6:24 AM   Result Value Ref Range    Hemoglobin 9.1 (L) 13.3 - 17.7 g/dL   Potassium    Collection Time: 03/17/22  6:24 AM   Result Value Ref Range    Potassium 3.4 (L) 3.5 - 5.0 mmol/L       "

## 2022-03-17 NOTE — PROGRESS NOTES
Progress Note    Assessment/Plan  All chest tubes removed.  Discharge to care facility per hospital staff.  He does not need to return to my clinic for follow-up in light of the difficulty that would present.  Should have chest x-ray and follow-up in approximately 1 month.  Chest x-ray noted bandage can be changed at any time reinforced or changed.  Active Problems:    Pleural effusion    Fall    Fall, initial encounter    Traumatic head injury with multiple lacerations, initial encounter    Empyema of left pleural space (H)    Poor dentition      Subjective  Comfortable  Objective    Vital signs in last 24 hours  Temp:  [97.9  F (36.6  C)-98.3  F (36.8  C)] 98  F (36.7  C)  Pulse:  [63-90] 65  Resp:  [16-20] 18  BP: (103-146)/(57-79) 146/76  SpO2:  [87 %-96 %] 96 %  Weight:   [unfilled]    Intake/Output last 3 shifts  I/O last 3 completed shifts:  In: 490 [P.O.:240]  Out: 840 [Urine:750; Chest Tube:90]  Intake/Output this shift:  No intake/output data recorded.      Physical Exam  Good respiratory effort chest tube output nil    Pertinent Labs   Lab Results   Component Value Date    WBC 11.2 (H) 03/16/2022    HGB 9.1 (L) 03/17/2022    HCT 23.8 (L) 03/16/2022    MCV 84 03/16/2022     (H) 03/16/2022             Pertinent Radiology     [unfilled]        Perry Martell MD

## 2022-03-17 NOTE — PLAN OF CARE
Goal Outcome Evaluation:    Plan of Care Reviewed With: patient     Patient pleasant, needs reminder cues regarding why he is hospitalized, how everything is going, and so on. Taking fluids and food--eats slowly. No output from chest tubes thus far this shift.

## 2022-03-17 NOTE — SIGNIFICANT EVENT
Significant Event Note    Time of event: 6:18 PM March 17, 2022    Description of event:  Paged concerning an unwitnessed fall.    Per report, patient was found on his buttocks on the floor in front of his chair. He reports no pain or injuries. He did not hit his head. He is not on any blood thinners.    Plan:  Continue to monitor.    Discussed with: bedside nurse    Vicky Perez MD

## 2022-03-17 NOTE — PROGRESS NOTES
Care Management Follow Up    Length of Stay (days): 9    Expected Discharge Date: 03/18/2022     Concerns to be Addressed:  Chest tubes removed today, medical progression, TCU placement     Patient plan of care discussed at interdisciplinary rounds: Yes    Anticipated Discharge Disposition: Transitional Care     Anticipated Discharge Services:  Skilled nursing, therapy services  Anticipated Discharge DME: per therapy, if indicated    Patient/family educated on Medicare website which has current facility and service quality ratings:  Yes  Education Provided on the Discharge Plan:  Per Care Team  Patient/Family in Agreement with the Plan: yes    Referrals Placed by CM/SW: Post Acute Facilities  Private pay costs discussed: Not applicable    Additional Information:  Chart review completed.  Care management following progression, reviewing recommendations from care team, and assisting with discharge planning.  Discharge recommendations and planning is for TCU.  BrotherGaurav, is primary contact and legal guardian is agreeable with TCU.  Referrals sent 3/16 and are pending.  Anticipate discharge tomorrow once TCU placement secured.  PAS needed.  Called [facility] ([phone]) and spoke with Carolin to provide update on discharge plan.  CM to notify [employer] with final destination.  Patient will likely need transport scheduled.      Mercy Rutherford RN

## 2022-03-17 NOTE — PLAN OF CARE
Problem: Plan of Care - These are the overarching goals to be used throughout the patient stay.    Goal: Plan of Care Review/Shift Note  Description: The Plan of Care Review/Shift note should be completed every shift.  The Outcome Evaluation is a brief statement about your assessment that the patient is improving, declining, or no change.  This information will be displayed automatically on your shift note.  Outcome: Ongoing, Progressing   Goal Outcome Evaluation:    Patient had no complaints of pain. Sats on 1L mid 90's but when sleeping down to 87% so he was increased to 2L. Lungs clear but decreased. Chest tube 3 had 20ml's out and chest tubes 1 and 2 had 50ml's out total. He had a large black bowel movement.

## 2022-03-17 NOTE — PROGRESS NOTES
Infectious Disease Progress Note    Assessment/Plan   Left pleural empyema , anaerobic, with 3+ Eikenella, fusobacteria , parvimonas, No staph  Patient has poor dentition and a recent fall and head injury, so suspect oral anaerobes.    Significant complex and loculated empyema on CT.     PROCEDURE DATE:  3/8/2022 - 3/11/2022    PROCEDURE: 1.  Thoracoscopy with lavage and cleanout left lower lobe and left upper lobe 2.  Left thoracoscopy with initial dissection with need for conversion to thoracotomy for longstanding marked empyema and thick peel 3.  Left thoracotomy complete decortication and complete parietal pleurectomy placement of [address] [place] chest tubes    COVID 2021    CRP 18  Leukocytosis improved  Chest tubes out 3/17     Recommendations:     On UNASYN    PO abx reasonable at discharge  AUGMENTIN x 2 weeks  Labs     Simeon Lazaro M.D.      Subjective  Stable. No SOB.  He feels good  Ate breakfast  Chest tubes being removed      Objective    Vital signs in last 24 hours  Temp:  [97.9  F (36.6  C)-98.3  F (36.8  C)] 98  F (36.7  C)  Pulse:  [63-90] 65  Resp:  [16-20] 18  BP: (103-146)/(57-79) 146/76  SpO2:  [87 %-96 %] 96 %  Wt Readings from Last 3 Encounters:   03/10/22 53.5 kg (118 lb)           Intake/Output last 3 shifts  I/O last 3 completed shifts:  In: 490 [P.O.:240]  Out: 840 [Urine:750; Chest Tube:90]  Intake/Output this shift:  No intake/output data recorded.    Review of Systems   Pertinent items are noted in HPI., otherwise negative.    Physical Exam  Gen. appearance nontoxic  Eyes no conjunctivitis or icterus  Neck no stiffness   Heart  no edema  Lungs chest tubes no SOB  Abdomen soft not tender  Extremities no synovitis, trace edema  Skin  no rash or emboli  Neurologic alert oriented no focal deficits         Pertinent Labs   Lab Results: personally reviewed.     Recent Labs   Lab 03/17/22  0624 03/16/22  1519 03/16/22  0654 03/12/22  0534 03/11/22 2015   WBC  --   --  11.2* 18.1* 23.7*   HGB  9.1* 8.7* 6.9* 8.6* 7.2*   HCT  --   --  23.8* 29.5* 25.3*   PLT  --   --  570* 574* 541*        Recent Labs   Lab 03/16/22  0654 03/12/22  0534 03/10/22  1431    137 139   CO2 24 20* 24   BUN 8 8 10     No results found for: CULT  Collected Updated Procedure Result Status    03/09/2022 1759 03/10/2022 1335 Pleural fluid Aerobic Bacterial Culture Routine with Gram Stain [[id number]]   (Abnormal)   Pleural fluid from Pleural Cavity, Left    Preliminary result Component Value   Culture No growth, less than 1 day P   Gram Stain Result 4+ Gram positive cocci Abnormal  P    4+ WBC seen Abnormal  P    Predominantly PMNs           10/16/2021 1011 03/08/2022 1032 COVID-19 VIRUS (CORONAVIRUS) BY PCR (EXTERNAL RESULT) Final result Component Value   No component results           03/22/2021 1056 03/08/2022 1032 COVID-19 VIRUS (CORONAVIRUS) BY PCR (EXTERNAL RESULT) [[id number]]    Swab    Final result Component Value   COVID-19 Virus by PCR (External Result) Not Detected          Pertinent Radiology   Radiology Results:   XR Chest 1 View    Result Date: 3/9/2022  EXAM: XR CHEST 1 VIEW LOCATION: [facility] DATE/TIME: 3/9/2022 2:25 PM INDICATION: Question pleural effusion. COMPARISON: None.     IMPRESSION: Heart and mediastinal size within normal limits. There is a moderate left pleural effusion. Associated compressive atelectasis is present at the left lung base with concurrent infectious process not excluded. The right lung is clear. There is  minimal amount of scarring versus atelectasis at the lateral right lung base. No pneumothorax.    US Thoracentesis    Result Date: 3/9/2022  EXAM: 1. LEFT THORACENTESIS 2. ULTRASOUND GUIDANCE LOCATION: [facility] DATE/TIME: 3/9/2022 5:05 PM INDICATION: Pleural effusion. PROCEDURE: Informed consent obtained. Time out performed. The chest was prepped and draped in sterile fashion. 5 mL of 1 % lidocaine was infused into the local  soft tissues. Under direct ultrasound guidance, a 5 Romanian catheter system was placed into the  pleural effusion. 0.1 liters of foamy yellow material was removed and sent to lab. The foamy quality was very atypical. Patient tolerated procedure well. Ultrasound imaging was obtained and placed in the patient's permanent medical record.     IMPRESSION: Status post left ultrasound-guided thoracentesis. Reference CPT Code: 14641    XR Chest Port 1 View    Result Date: 3/17/2022  EXAM: XR CHEST PORT 1 VIEW LOCATION: [facility] DATE/TIME: 3/17/2022 6:05 AM INDICATION: Indwelling left-sided chest tubes. Assess for pneumothorax. COMPARISON: Multiple prior studies with most recent from 03/16/2022.     IMPRESSION: Stable positioning of left-sided chest tubes. No pneumothorax. Left basilar pleural fluid and atelectasis unchanged. Minimal pleural fluid and atelectasis right lung base unchanged. Minimal cardiac enlargement and pulmonary vascular congestion.    XR Chest Port 1 View    Result Date: 3/16/2022  EXAM: XR CHEST PORT 1 VIEW LOCATION: [facility] DATE/TIME: 3/16/2022 6:07 AM INDICATION: Indwelling chest tubes. Evaluate for pneumothorax. COMPARISON: Multiple prior chest regressed the most recent from 3/15/2022 and 3/14/2022     IMPRESSION: Stable left-sided chest tubes. No pneumothorax. Interval decrease in left-sided pleural fluid and atelectasis. Minimal right basilar pleural fluid and atelectasis. Mild cardiac enlargement. Mild pulmonary vascular congestion.    XR Chest Port 1 View    Result Date: 3/15/2022  EXAM: XR CHEST PORT 1 VIEW LOCATION: [facility] DATE/TIME: 3/15/2022 5:47 AM INDICATION: Indwelling chest tube tip. Follow-up assessment. COMPARISON: 03/14/2022     IMPRESSION: Stable positioning of 3 left-sided chest tubes. Minimal interval increase in pleural fluid and atelectasis in the left perihilar region/left upper lung with minimal  residual left upper lung aeration remaining. Left apical pneumothorax visualized. Resolution of the right apical pneumothorax. Minimal right basilar pleural fluid. Mild cardiac enlargement. Normal pulmonary vascularity.    XR Chest Port 1 View    Result Date: 3/14/2022  EXAM: XR CHEST PORT 1 VIEW LOCATION: [facility] DATE/TIME: 3/14/2022 6:15 AM INDICATION: Left-sided chest tubes and right-sided pneumothorax. Follow-up assessment. COMPARISON: Multiple prior studies with most recent from 03/13/2022     IMPRESSION: Interval decrease in right apical pneumothorax with a decrease in the separation of the visceral parietal pleura which no measures 1.1 cm, previously 2 cm. Multiple left-sided chest tube stable position. Minimal left apical pneumothorax. Pleural fluid and atelectasis or infiltrate in the left perihilar region left lung base. Minimal right basilar pleural fluid with infiltrate or atelectasis. Cardiac enlargement.    XR Chest Port 1 View    Result Date: 3/13/2022  EXAM: XR CHEST PORT 1 VIEW LOCATION: [facility] DATE/TIME: 3/13/2022 5:57 AM INDICATION: Follow-up right-sided pneumothorax and indwelling left-sided chest tubes COMPARISON: 03/12/2021     IMPRESSION: Stable positioning of multiple left-sided chest tubes. No left-sided pneumothorax. Interval increase in volume loss and pleural fluid left lung base. Infiltrate and pleural fluid right lung base unchanged. Right apical pneumothorax at 2 cm, previously 1.5 cm.    XR Chest Port 1 View    Result Date: 3/12/2022  EXAM: XR CHEST PORT 1 VIEW LOCATION: [facility] DATE/TIME: 3/12/2022 5:56 AM INDICATION: Follow-up chest tube placement and pneumothorax. COMPARISON: Chest radiograph 03/09/2022, ultrasound-guided thoracentesis 03/09/2022, CT 03/10/2022     IMPRESSION: New minimal right apical pneumothorax. Separation of visceral and parietal pleura by 1.5 cm. Minimal right basilar pleural  fluid with atelectasis. 3 left-sided chest tubes in place. No definitive evidence for left-sided pneumothorax. Marked subcutaneous emphysema left lateral chest wall. Atelectasis or infiltrate left lung base. Mild cardiac enlargement. Normal pulmonary vascularity. [Critical Result: New pneumothorax] Patient's nurse was contacted by me on 3/12/2022 7:00 AM and verbalized understanding of the critical result.     CT Chest w/o Contrast    Result Date: 3/10/2022  EXAM: CT CHEST W/O CONTRAST LOCATION: [facility] DATE/TIME: 3/10/2022 2:07 PM INDICATION: Evaluate pleural effusion COMPARISON: Portable AP view of the chest 03/09/2022 TECHNIQUE: CT chest without IV contrast. Multiplanar reformats were obtained. Dose reduction techniques were used. CONTRAST: None. FINDINGS: LUNGS AND PLEURA: There is a loculated fluid collection in the posterior left pleural space with a circumscribed slightly higher attenuation rim that measures around 4 cm AP and is 16 cm craniocaudal. The loculated area has small foci of suspended air likely from the recent thoracentesis and indicates the fluid is complex. There is separate free fluid which layers into the left posterior costophrenic sulcus external to the focal loculation. Complete atelectasis of the left upper lobe along the mediastinal pleura. There is also multisegment atelectasis in the basilar segments of the left lower lobe. Within the atelectatic territories there are foci of high attenuation/calcification. There are bands of atelectasis in the right lower lobe, inferior right middle lobe, inferior right upper lobe. Adjacent to the atelectatic bands are also indistinct nodular opacities particularly in the middle and upper lobes, not well characterized due to motion-related blurring but likely reflecting a component of acute inflammation. The trachea and central airways are patent. No visible intraluminal material in the larger medium airways. MEDIASTINUM:  Moderate hiatal hernia. Patulous esophagus which contains both air and fluid to near the level of the thoracic inlet. Cardiac chambers are normal in size. No pericardial effusion. Normal caliber thoracic aorta. CORONARY ARTERY CALCIFICATION: None. UPPER ABDOMEN: There are peripherally calcified stones in the lumen of the gallbladder. No gallbladder wall thickening or pericholecystic edema. MUSCULOSKELETAL: Minimal thoracic degenerative osteophytes. No aggressive or destructive bone lesions.     IMPRESSION: 1.  [place] loculated pleural fluid collection in the posterior left chest with circumscribed surrounding peel consistent with empyema. Separate smaller amount of free fluid layering into the posterior sulcus. 2.  Lobar atelectasis of the left upper lobe compressed against the mediastinal pleura. Multisegment atelectasis in the basal left lower lobe. Consider surgical consultation given constellation of #1 and #2. 3.  Hiatal hernia and patulous esophagus, which can predispose to aspiration events. 4.  Nodular opacities associated with bands of atelectasis in the right middle and upper lobes and could relate to aspiration. 5.  Cholelithiasis.

## 2022-03-18 ENCOUNTER — APPOINTMENT (OUTPATIENT)
Dept: RADIOLOGY | Facility: HOSPITAL | Age: 77
DRG: 163 | End: 2022-03-18
Attending: SURGERY
Payer: MEDICARE

## 2022-03-18 LAB
BACTERIA ABSC ANAEROBE+AEROBE CULT: ABNORMAL
C REACTIVE PROTEIN LHE: 4 MG/DL (ref 0–0.8)
GLUCOSE BLDC GLUCOMTR-MCNC: 118 MG/DL (ref 70–99)
GLUCOSE BLDC GLUCOMTR-MCNC: 98 MG/DL (ref 70–99)
HGB BLD-MCNC: 9 G/DL (ref 13.3–17.7)
POTASSIUM BLD-SCNC: 3 MMOL/L (ref 3.5–5)
POTASSIUM BLD-SCNC: 3.7 MMOL/L (ref 3.5–5)

## 2022-03-18 PROCEDURE — 84132 ASSAY OF SERUM POTASSIUM: CPT | Performed by: INTERNAL MEDICINE

## 2022-03-18 PROCEDURE — 250N000011 HC RX IP 250 OP 636

## 2022-03-18 PROCEDURE — 71045 X-RAY EXAM CHEST 1 VIEW: CPT

## 2022-03-18 PROCEDURE — 36415 COLL VENOUS BLD VENIPUNCTURE: CPT | Performed by: INTERNAL MEDICINE

## 2022-03-18 PROCEDURE — 250N000013 HC RX MED GY IP 250 OP 250 PS 637: Performed by: INTERNAL MEDICINE

## 2022-03-18 PROCEDURE — 86140 C-REACTIVE PROTEIN: CPT | Performed by: INTERNAL MEDICINE

## 2022-03-18 PROCEDURE — 99232 SBSQ HOSP IP/OBS MODERATE 35: CPT | Performed by: INTERNAL MEDICINE

## 2022-03-18 PROCEDURE — 85018 HEMOGLOBIN: CPT | Performed by: INTERNAL MEDICINE

## 2022-03-18 PROCEDURE — 250N000013 HC RX MED GY IP 250 OP 250 PS 637: Performed by: HOSPITALIST

## 2022-03-18 PROCEDURE — 120N000001 HC R&B MED SURG/OB

## 2022-03-18 RX ORDER — POTASSIUM CHLORIDE 1500 MG/1
40 TABLET, EXTENDED RELEASE ORAL ONCE
Status: COMPLETED | OUTPATIENT
Start: 2022-03-18 | End: 2022-03-18

## 2022-03-18 RX ORDER — LORAZEPAM 2 MG/1
2 TABLET ORAL
Qty: 60 TABLET | Refills: 0 | Status: SHIPPED | OUTPATIENT
Start: 2022-03-18 | End: 2022-04-01

## 2022-03-18 RX ORDER — SERTRALINE HYDROCHLORIDE 25 MG/1
25 TABLET, FILM COATED ORAL DAILY
Qty: 30 TABLET | Refills: 0 | Status: SHIPPED | OUTPATIENT
Start: 2022-03-18

## 2022-03-18 RX ORDER — POLYETHYLENE GLYCOL 3350 17 G/17G
17 POWDER, FOR SOLUTION ORAL 2 TIMES DAILY PRN
Status: DISCONTINUED | OUTPATIENT
Start: 2022-03-18 | End: 2022-03-21 | Stop reason: HOSPADM

## 2022-03-18 RX ORDER — ACETAMINOPHEN 500 MG
1000 TABLET ORAL EVERY 8 HOURS PRN
COMMUNITY
Start: 2022-03-18

## 2022-03-18 RX ORDER — AMLODIPINE BESYLATE 5 MG/1
5 TABLET ORAL DAILY
Qty: 30 TABLET | Refills: 0 | Status: SHIPPED | OUTPATIENT
Start: 2022-03-18

## 2022-03-18 RX ORDER — ATENOLOL 25 MG/1
25 TABLET ORAL DAILY
Qty: 30 TABLET | Refills: 0 | Status: SHIPPED | OUTPATIENT
Start: 2022-03-18

## 2022-03-18 RX ADMIN — POTASSIUM CHLORIDE 40 MEQ: 1500 TABLET, EXTENDED RELEASE ORAL at 09:58

## 2022-03-18 RX ADMIN — POTASSIUM CHLORIDE 40 MEQ: 1500 TABLET, EXTENDED RELEASE ORAL at 17:12

## 2022-03-18 RX ADMIN — AMPICILLIN SODIUM AND SULBACTAM SODIUM 3 G: 2; 1 INJECTION, POWDER, FOR SOLUTION INTRAMUSCULAR; INTRAVENOUS at 16:29

## 2022-03-18 RX ADMIN — AMPICILLIN SODIUM AND SULBACTAM SODIUM 3 G: 2; 1 INJECTION, POWDER, FOR SOLUTION INTRAMUSCULAR; INTRAVENOUS at 10:50

## 2022-03-18 RX ADMIN — ATENOLOL 25 MG: 25 TABLET ORAL at 08:23

## 2022-03-18 RX ADMIN — THERA TABS 1 TABLET: TAB at 09:57

## 2022-03-18 RX ADMIN — ACETAMINOPHEN 975 MG: 325 TABLET ORAL at 05:01

## 2022-03-18 RX ADMIN — SERTRALINE HYDROCHLORIDE 25 MG: 25 TABLET ORAL at 08:24

## 2022-03-18 RX ADMIN — AMPICILLIN SODIUM AND SULBACTAM SODIUM 3 G: 2; 1 INJECTION, POWDER, FOR SOLUTION INTRAMUSCULAR; INTRAVENOUS at 23:49

## 2022-03-18 RX ADMIN — AMPICILLIN SODIUM AND SULBACTAM SODIUM 3 G: 2; 1 INJECTION, POWDER, FOR SOLUTION INTRAMUSCULAR; INTRAVENOUS at 04:56

## 2022-03-18 RX ADMIN — LORAZEPAM 2 MG: 1 TABLET ORAL at 16:26

## 2022-03-18 ASSESSMENT — ACTIVITIES OF DAILY LIVING (ADL)
ADLS_ACUITY_SCORE: 15
ADLS_ACUITY_SCORE: 17
ADLS_ACUITY_SCORE: 15

## 2022-03-18 NOTE — PLAN OF CARE
Physical Therapy Discharge Summary    Reason for therapy discharge:    Discharged to transitional care facility.    Progress towards therapy goal(s). See goals on Care Plan in [facility] electronic health record for goal details.  Goals partially met.  Barriers to achieving goals:   behavior/refusals, chest tube, and inconsistent performance.    Therapy recommendation(s):    Continued therapy is recommended.  Rationale/Recommendations:  demonstrated good potential over a week ago/less participation since.

## 2022-03-18 NOTE — PROGRESS NOTES
[facility] Internal Medicine Progress Note      ASSESSMENT:    Active Problems:    Pleural effusion    Fall    Fall, initial encounter    Traumatic head injury with multiple lacerations, initial encounter    Empyema of left pleural space (H)    Poor dentition      PLAN:   76 year old male from group home with history of hypertension, Intellectual disability, hypertension, OCD, anxiety disorder and mood disorder admitted on 3/8/2022 due to mechanical fall and head laceration. CT C-spine showed pleural fluid filling the left lung apex. Chest x-ray showed moderate to marked left sided pleural fluid collection.           Empyema of left pleural space: likely related to aspiration in the setting of poor dentition and recent fall with head injury   On 3/11/22, CTS performed thoracoscopy with lavage and cleanout left lower lobe and left upper lobe, Left thoracoscopy with conversion to thoracotomy due to longstanding marked empyema requiring complete decortication and complete parietal pleurectomy and placement of chest tubes.   Left pleural fluid culture grew Eikenella corrodens and fusobacterium nucleatum.   -Received IV vancomycin (3/10/22 -3/14/22)  -Continue IV Unasyn for now with plan to transition to Augmentin for 2 weeks at discharge   -Chest tubes removed 3/17/22  -Patient is medically stable for discharge, awaiting clearance from [facility] assessment  -Plan TCU discharge when cleared       Head injury secondary to fall:  Patient presented with mechanical fall from his group home.  CT scan of the head showed questionable subdural hygroma measuring up to 0.9 cm  versus prominence of subarachnoid space overlying the left parietal lobe.  CT C-spine  showed no acute fractures.   -Laceration on his head has been repaired in the ED.  -Fall precautions in place        Anemia: suspect due to ongoing illness, phlebotomy. RN notes black stools but this has been ongoing, no signs of hemodynamic instability or clnical concern for  "GI bleed with rising Hgb  -- transfused 1 U PRBC 3/16 and subsequent hemoglobin checks are stable  --Stable for discharge once cleared for TCU placement per [place] assessment          Hypertension:    -Continue home amlodipine and atenolol as BP allows        Mood disorder:  -Continue PTA sertraline, ativan      Severe malnutrition:  --RD following  --Given psychiatry history would avoid appetite stimulant as inpatient, recommend outpatient RD and psychiatry referrals         DVT PPX: SCD           Jay Jay Stroud D.O.              -------------------------------------------------------------------------------------------------------------  SUBJECTIVE: NAD. Denies any nausea, vomiting, abdominal pain, chest pain, SOB, new swelling, fevers, chills, or headache.     Exam:  /65 (BP Location: Left arm)   Pulse 70   Temp 98.2  F (36.8  C) (Oral)   Resp 19   Ht 1.65 m (5' 4.96\")   Wt 52 kg (114 lb 10.2 oz)   SpO2 92%   BMI 19.10 kg/m    General: NAD  RESPIRATORY: Breathing nonlabored  CARDIOVASCULAR: trace le edema bilat.   ABDOMEN: soft and non-tender  NEUROLOGIC: Motor grossly intact, speech clear       Diagnostics Reviewed:      Recent Results (from the past 24 hour(s))   Glucose by meter    Collection Time: 03/17/22  9:42 PM   Result Value Ref Range    GLUCOSE BY METER POCT 106 (H) 70 - 99 mg/dL   Hemoglobin    Collection Time: 03/18/22  6:34 AM   Result Value Ref Range    Hemoglobin 9.0 (L) 13.3 - 17.7 g/dL   Potassium    Collection Time: 03/18/22  6:34 AM   Result Value Ref Range    Potassium 3.0 (L) 3.5 - 5.0 mmol/L   CRP inflammation    Collection Time: 03/18/22  6:34 AM   Result Value Ref Range    CRP 4.0 (H) 0.0-<0.8 mg/dL       "

## 2022-03-18 NOTE — PLAN OF CARE
Occupational Therapy Discharge Summary    Reason for therapy discharge:    No further expectations of functional progress.    Progress towards therapy goal(s). See goals on Care Plan in [facility] electronic health record for goal details.  Goals not met.  Barriers to achieving goals:   limited tolerance for therapy.    Therapy recommendation(s):    No further therapy is recommended.    Goal Outcome Evaluation:

## 2022-03-18 NOTE — PROGRESS NOTES
Infectious Disease Progress Note    Assessment/Plan   Left pleural empyema , anaerobic, with 3+ Eikenella, fusobacteria , parvimonas, No staph  Patient has poor dentition and a recent fall and head injury, so suspect oral anaerobes.    Significant complex and loculated empyema on CT.     PROCEDURE DATE:  3/8/2022 - 3/11/2022    PROCEDURE: 1.  Thoracoscopy with lavage and cleanout left lower lobe and left upper lobe 2.  Left thoracoscopy with initial dissection with need for conversion to thoracotomy for longstanding marked empyema and thick peel 3.  Left thoracotomy complete decortication and complete parietal pleurectomy placement of 336 [place] chest tubes    COVID 2021    CRP was 18  Leukocytosis improved  Chest tubes out 3/17     Recommendations:     On UNASYN    PO abx reasonable at discharge  AUGMENTIN x 2 weeks  Labs   ID recheck Monday    Simeon Lazaro M.D.      Subjective  Stable. No SOB.  He feels good  Ate breakfast  Chest tubes being removed      Objective    Vital signs in last 24 hours  Temp:  [97.4  F (36.3  C)-98  F (36.7  C)] 98  F (36.7  C)  Pulse:  [] 87  Resp:  [18-20] 18  BP: (102-118)/(56-71) 111/65  SpO2:  [92 %-98 %] 92 %  Wt Readings from Last 3 Encounters:   03/17/22 52 kg (114 lb 10.2 oz)           Intake/Output last 3 shifts  I/O last 3 completed shifts:  In: 480 [P.O.:480]  Out: 500 [Urine:500]  Intake/Output this shift:  No intake/output data recorded.    Review of Systems   Pertinent items are noted in HPI., otherwise negative.    Physical Exam  Gen. appearance nontoxic  Eyes no conjunctivitis or icterus  Neck no stiffness   Heart  no edema  Lungs chest tubes no SOB  Abdomen soft not tender  Extremities no synovitis, trace edema  Skin  no rash or emboli  Neurologic alert oriented no focal deficits         Pertinent Labs   Lab Results: personally reviewed.     Recent Labs   Lab 03/18/22  0634 03/17/22  0624 03/16/22  1519 03/16/22  0654 03/12/22  0534 03/11/22 2015   WBC  --    --   --  11.2* 18.1* 23.7*   HGB 9.0* 9.1* 8.7* 6.9* 8.6* 7.2*   HCT  --   --   --  23.8* 29.5* 25.3*   PLT  --   --   --  570* 574* 541*        Recent Labs   Lab 03/16/22  0654 03/12/22  0534    137   CO2 24 20*   BUN 8 8     No results found for: CULT  Collected Updated Procedure Result Status    03/09/2022 1759 03/10/2022 1335 Pleural fluid Aerobic Bacterial Culture Routine with Gram Stain [[id number]]   (Abnormal)   Pleural fluid from Pleural Cavity, Left    Preliminary result Component Value   Culture No growth, less than 1 day P   Gram Stain Result 4+ Gram positive cocci Abnormal  P    4+ WBC seen Abnormal  P    Predominantly PMNs           10/16/2021 1011 03/08/2022 1032 COVID-19 VIRUS (CORONAVIRUS) BY PCR (EXTERNAL RESULT) Final result Component Value   No component results           03/22/2021 1056 03/08/2022 1032 COVID-19 VIRUS (CORONAVIRUS) BY PCR (EXTERNAL RESULT) [[id number]]    Swab    Final result Component Value   COVID-19 Virus by PCR (External Result) Not Detected          Pertinent Radiology   Radiology Results:   XR Chest 1 View    Result Date: 3/9/2022  EXAM: XR CHEST 1 VIEW LOCATION: [facility] DATE/TIME: 3/9/2022 2:25 PM INDICATION: Question pleural effusion. COMPARISON: None.     IMPRESSION: Heart and mediastinal size within normal limits. There is a moderate left pleural effusion. Associated compressive atelectasis is present at the left lung base with concurrent infectious process not excluded. The right lung is clear. There is  minimal amount of scarring versus atelectasis at the lateral right lung base. No pneumothorax.    US Thoracentesis    Result Date: 3/9/2022  EXAM: 1. LEFT THORACENTESIS 2. ULTRASOUND GUIDANCE LOCATION: [facility] DATE/TIME: 3/9/2022 5:05 PM INDICATION: Pleural effusion. PROCEDURE: Informed consent obtained. Time out performed. The chest was prepped and draped in sterile fashion. 5 mL of 1 % lidocaine was  infused into the local soft tissues. Under direct ultrasound guidance, a 5 [place] catheter system was placed into the  pleural effusion. 0.1 liters of foamy yellow material was removed and sent to lab. The foamy quality was very atypical. Patient tolerated procedure well. Ultrasound imaging was obtained and placed in the patient's permanent medical record.     IMPRESSION: Status post left ultrasound-guided thoracentesis. Reference CPT Code: 21783    XR Chest Port 1 View    Result Date: 3/18/2022  EXAM: XR CHEST PORT 1 VIEW LOCATION: [facility] DATE/TIME: 3/18/2022 5:54 AM INDICATION: Interval removal of left-sided chest tubes. Evaluate for pneumothorax. COMPARISON: Multiple priors with most recent from 03/17/2022.     IMPRESSION: Removal of the multiple left-sided chest tubes. At the left lung base there is been interval development of a small area of lucency likely representing a small area of hydropneumothorax with interval decrease in left basilar pleural fluid since prior. Atelectasis left lung base. Follow-up chest radiograph recommended to assess for stability these findings. Minimal right basilar pleural fluid and atelectasis. Normal heart size and pulmonary vascularity.    XR Chest Port 1 View    Result Date: 3/17/2022  EXAM: XR CHEST PORT 1 VIEW LOCATION: [facility] DATE/TIME: 3/17/2022 6:05 AM INDICATION: Indwelling left-sided chest tubes. Assess for pneumothorax. COMPARISON: Multiple prior studies with most recent from 03/16/2022.     IMPRESSION: Stable positioning of left-sided chest tubes. No pneumothorax. Left basilar pleural fluid and atelectasis unchanged. Minimal pleural fluid and atelectasis right lung base unchanged. Minimal cardiac enlargement and pulmonary vascular congestion.    XR Chest Port 1 View    Result Date: 3/16/2022  EXAM: XR CHEST PORT 1 VIEW LOCATION: [facility] DATE/TIME: 3/16/2022 6:07 AM INDICATION:  Indwelling chest tubes. Evaluate for pneumothorax. COMPARISON: Multiple prior chest regressed the most recent from 3/15/2022 and 3/14/2022     IMPRESSION: Stable left-sided chest tubes. No pneumothorax. Interval decrease in left-sided pleural fluid and atelectasis. Minimal right basilar pleural fluid and atelectasis. Mild cardiac enlargement. Mild pulmonary vascular congestion.    XR Chest Port 1 View    Result Date: 3/15/2022  EXAM: XR CHEST PORT 1 VIEW LOCATION: [facility] DATE/TIME: 3/15/2022 5:47 AM INDICATION: Indwelling chest tube tip. Follow-up assessment. COMPARISON: 03/14/2022     IMPRESSION: Stable positioning of 3 left-sided chest tubes. Minimal interval increase in pleural fluid and atelectasis in the left perihilar region/left upper lung with minimal residual left upper lung aeration remaining. Left apical pneumothorax visualized. Resolution of the right apical pneumothorax. Minimal right basilar pleural fluid. Mild cardiac enlargement. Normal pulmonary vascularity.    XR Chest Port 1 View    Result Date: 3/14/2022  EXAM: XR CHEST PORT 1 VIEW LOCATION: [facility] DATE/TIME: 3/14/2022 6:15 AM INDICATION: Left-sided chest tubes and right-sided pneumothorax. Follow-up assessment. COMPARISON: Multiple prior studies with most recent from 03/13/2022     IMPRESSION: Interval decrease in right apical pneumothorax with a decrease in the separation of the visceral parietal pleura which no measures 1.1 cm, previously 2 cm. Multiple left-sided chest tube stable position. Minimal left apical pneumothorax. Pleural fluid and atelectasis or infiltrate in the left perihilar region left lung base. Minimal right basilar pleural fluid with infiltrate or atelectasis. Cardiac enlargement.    XR Chest Port 1 View    Result Date: 3/13/2022  EXAM: XR CHEST PORT 1 VIEW LOCATION: [facility] DATE/TIME: 3/13/2022 5:57 AM INDICATION: Follow-up right-sided  pneumothorax and indwelling left-sided chest tubes COMPARISON: 03/12/2021     IMPRESSION: Stable positioning of multiple left-sided chest tubes. No left-sided pneumothorax. Interval increase in volume loss and pleural fluid left lung base. Infiltrate and pleural fluid right lung base unchanged. Right apical pneumothorax at 2 cm, previously 1.5 cm.    XR Chest Port 1 View    Result Date: 3/12/2022  EXAM: XR CHEST PORT 1 VIEW LOCATION: [facility] DATE/TIME: 3/12/2022 5:56 AM INDICATION: Follow-up chest tube placement and pneumothorax. COMPARISON: Chest radiograph 03/09/2022, ultrasound-guided thoracentesis 03/09/2022, CT 03/10/2022     IMPRESSION: New minimal right apical pneumothorax. Separation of visceral and parietal pleura by 1.5 cm. Minimal right basilar pleural fluid with atelectasis. 3 left-sided chest tubes in place. No definitive evidence for left-sided pneumothorax. Marked subcutaneous emphysema left lateral chest wall. Atelectasis or infiltrate left lung base. Mild cardiac enlargement. Normal pulmonary vascularity. [Critical Result: New pneumothorax] Patient's nurse was contacted by me on 3/12/2022 7:00 AM and verbalized understanding of the critical result.     CT Chest w/o Contrast    Result Date: 3/10/2022  EXAM: CT CHEST W/O CONTRAST LOCATION: [facility] DATE/TIME: 3/10/2022 2:07 PM INDICATION: Evaluate pleural effusion COMPARISON: Portable AP view of the chest 03/09/2022 TECHNIQUE: CT chest without IV contrast. Multiplanar reformats were obtained. Dose reduction techniques were used. CONTRAST: None. FINDINGS: LUNGS AND PLEURA: There is a loculated fluid collection in the posterior left pleural space with a circumscribed slightly higher attenuation rim that measures around 4 cm AP and is 16 cm craniocaudal. The loculated area has small foci of suspended air likely from the recent thoracentesis and indicates the fluid is complex. There is separate free  fluid which layers into the left posterior costophrenic sulcus external to the focal loculation. Complete atelectasis of the left upper lobe along the mediastinal pleura. There is also multisegment atelectasis in the basilar segments of the left lower lobe. Within the atelectatic territories there are foci of high attenuation/calcification. There are bands of atelectasis in the right lower lobe, inferior right middle lobe, inferior right upper lobe. Adjacent to the atelectatic bands are also indistinct nodular opacities particularly in the middle and upper lobes, not well characterized due to motion-related blurring but likely reflecting a component of acute inflammation. The trachea and central airways are patent. No visible intraluminal material in the larger medium airways. MEDIASTINUM: Moderate hiatal hernia. Patulous esophagus which contains both air and fluid to near the level of the thoracic inlet. Cardiac chambers are normal in size. No pericardial effusion. Normal caliber thoracic aorta. CORONARY ARTERY CALCIFICATION: None. UPPER ABDOMEN: There are peripherally calcified stones in the lumen of the gallbladder. No gallbladder wall thickening or pericholecystic edema. MUSCULOSKELETAL: Minimal thoracic degenerative osteophytes. No aggressive or destructive bone lesions.     IMPRESSION: 1.  [place] loculated pleural fluid collection in the posterior left chest with circumscribed surrounding peel consistent with empyema. Separate smaller amount of free fluid layering into the posterior sulcus. 2.  Lobar atelectasis of the left upper lobe compressed against the mediastinal pleura. Multisegment atelectasis in the basal left lower lobe. Consider surgical consultation given constellation of #1 and #2. 3.  Hiatal hernia and patulous esophagus, which can predispose to aspiration events. 4.  Nodular opacities associated with bands of atelectasis in the right middle and upper lobes and could relate to aspiration. 5.   Cholelithiasis.

## 2022-03-18 NOTE — PLAN OF CARE
Problem: Fall Injury Risk  Goal: Absence of Fall and Fall-Related Injury  Outcome: Ongoing, Progressing  Intervention: Promote Injury-Free Environment  Recent Flowsheet Documentation  Taken 3/18/2022 0053 by Niki Knott, RN  Safety Promotion/Fall Prevention: safety round/check completed  Taken 3/17/2022 2125 by Niki Knott, RN  Safety Promotion/Fall Prevention: safety round/check completed     Problem: Infection  Goal: Absence of Infection Signs and Symptoms  Outcome: Ongoing, Progressing   Goal Outcome Evaluation:      Pt alert and confused,denies having any pain,VS stable,chest tube site dressing intact,no falls this shift

## 2022-03-19 LAB
GLUCOSE BLDC GLUCOMTR-MCNC: 105 MG/DL (ref 70–99)
GLUCOSE BLDC GLUCOMTR-MCNC: 158 MG/DL (ref 70–99)
HOLD SPECIMEN: NORMAL
POTASSIUM BLD-SCNC: 3.8 MMOL/L (ref 3.5–5)

## 2022-03-19 PROCEDURE — 250N000013 HC RX MED GY IP 250 OP 250 PS 637: Performed by: INTERNAL MEDICINE

## 2022-03-19 PROCEDURE — 250N000013 HC RX MED GY IP 250 OP 250 PS 637: Performed by: HOSPITALIST

## 2022-03-19 PROCEDURE — 120N000001 HC R&B MED SURG/OB

## 2022-03-19 PROCEDURE — 36415 COLL VENOUS BLD VENIPUNCTURE: CPT | Performed by: INTERNAL MEDICINE

## 2022-03-19 PROCEDURE — 250N000011 HC RX IP 250 OP 636

## 2022-03-19 PROCEDURE — 84132 ASSAY OF SERUM POTASSIUM: CPT | Performed by: INTERNAL MEDICINE

## 2022-03-19 PROCEDURE — 99232 SBSQ HOSP IP/OBS MODERATE 35: CPT | Performed by: INTERNAL MEDICINE

## 2022-03-19 RX ADMIN — AMPICILLIN SODIUM AND SULBACTAM SODIUM 3 G: 2; 1 INJECTION, POWDER, FOR SOLUTION INTRAMUSCULAR; INTRAVENOUS at 04:20

## 2022-03-19 RX ADMIN — THERA TABS 1 TABLET: TAB at 09:28

## 2022-03-19 RX ADMIN — ATENOLOL 25 MG: 25 TABLET ORAL at 09:27

## 2022-03-19 RX ADMIN — AMLODIPINE BESYLATE 5 MG: 5 TABLET ORAL at 09:27

## 2022-03-19 RX ADMIN — AMPICILLIN SODIUM AND SULBACTAM SODIUM 3 G: 2; 1 INJECTION, POWDER, FOR SOLUTION INTRAMUSCULAR; INTRAVENOUS at 09:28

## 2022-03-19 RX ADMIN — LORAZEPAM 2 MG: 1 TABLET ORAL at 15:47

## 2022-03-19 RX ADMIN — AMPICILLIN SODIUM AND SULBACTAM SODIUM 3 G: 2; 1 INJECTION, POWDER, FOR SOLUTION INTRAMUSCULAR; INTRAVENOUS at 21:39

## 2022-03-19 RX ADMIN — SERTRALINE HYDROCHLORIDE 25 MG: 25 TABLET ORAL at 09:28

## 2022-03-19 RX ADMIN — AMPICILLIN SODIUM AND SULBACTAM SODIUM 3 G: 2; 1 INJECTION, POWDER, FOR SOLUTION INTRAMUSCULAR; INTRAVENOUS at 15:13

## 2022-03-19 RX ADMIN — ACETAMINOPHEN 975 MG: 325 TABLET ORAL at 21:38

## 2022-03-19 ASSESSMENT — ACTIVITIES OF DAILY LIVING (ADL)
ADLS_ACUITY_SCORE: 21
ADLS_ACUITY_SCORE: 19
ADLS_ACUITY_SCORE: 21
ADLS_ACUITY_SCORE: 19
ADLS_ACUITY_SCORE: 21
ADLS_ACUITY_SCORE: 19
ADLS_ACUITY_SCORE: 21
ADLS_ACUITY_SCORE: 17
ADLS_ACUITY_SCORE: 21

## 2022-03-19 NOTE — PROGRESS NOTES
[facility] [place] [facility] [place]      ASSESSMENT:    Active Problems:    Pleural effusion    Fall    Fall, initial encounter    Traumatic head injury with multiple lacerations, initial encounter    Empyema of left pleural space (H)    Poor dentition      PLAN:   76 year old male from group home with history of hypertension, Intellectual disability, hypertension, OCD, anxiety disorder and mood disorder admitted on 3/8/2022 due to mechanical fall and head laceration. CT C-spine showed pleural fluid filling the left lung apex. Chest x-ray showed moderate to marked left sided pleural fluid collection.           Empyema of left pleural space: likely related to aspiration in the setting of poor dentition and recent fall with head injury   On 3/11/22, CTS performed thoracoscopy with lavage and cleanout left lower lobe and left upper lobe, Left thoracoscopy with conversion to thoracotomy due to longstanding marked empyema requiring complete decortication and complete parietal pleurectomy and placement of chest tubes.   Left pleural fluid culture grew Eikenella corrodens and fusobacterium nucleatum.   -Received IV vancomycin (3/10/22 -3/14/22)  -Continue IV Unasyn while inpatient with plan to transition to Augmentin for 2 weeks at discharge   -Chest tubes removed 3/17/22  -Patient is medically stable for discharge, awaiting clearance from [facility] assessment  -Plan TCU discharge when cleared       Head injury secondary to fall:  Patient presented with mechanical fall from his group home.  CT scan of the head showed questionable subdural hygroma measuring up to 0.9 cm  versus prominence of subarachnoid space overlying the left parietal lobe.  CT C-spine  showed no acute fractures.   -Laceration on his head has been repaired in the ED.  -Fall precautions in place        Anemia: suspect due to ongoing illness, phlebotomy. RN notes black stools but this has been ongoing, no signs of hemodynamic instability or clnical  "concern for GI bleed with rising Hgb  -- transfused 1 U PRBC 3/16 and subsequent hemoglobin checks are stable  --Stable for discharge once cleared for TCU placement per [place] assessment          Hypertension:    -Continue home amlodipine and atenolol as BP allows        Mood disorder:  -Continue PTA sertraline, ativan      Severe malnutrition:  --RD following  --Given psychiatry history would avoid appetite stimulant as inpatient, recommend outpatient RD and psychiatry referrals         DVT PPX: SCD           Jay Jay Stroud D.O.              -------------------------------------------------------------------------------------------------------------  SUBJECTIVE: NAD. Denies any nausea, vomiting, abdominal pain, chest pain, SOB, new swelling, fevers, chills, or headache.     Exam:  /63 (BP Location: Left arm)   Pulse 78   Temp 97.5  F (36.4  C) (Axillary)   Resp 20   Ht 1.65 m (5' 4.96\")   Wt 52 kg (114 lb 10.2 oz)   SpO2 95%   BMI 19.10 kg/m    General: NAD  RESPIRATORY: Breathing nonlabored  CARDIOVASCULAR: no le edema bilat.   ABDOMEN: soft and non-tender  NEUROLOGIC: Motor grossly intact, speech clear       Diagnostics Reviewed:      Recent Results (from the past 24 hour(s))   Glucose by meter    Collection Time: 03/18/22  4:36 PM   Result Value Ref Range    GLUCOSE BY METER POCT 98 70 - 99 mg/dL   Potassium    Collection Time: 03/18/22  5:50 PM   Result Value Ref Range    Potassium 3.7 3.5 - 5.0 mmol/L   Glucose by meter    Collection Time: 03/18/22  8:43 PM   Result Value Ref Range    GLUCOSE BY METER POCT 118 (H) 70 - 99 mg/dL   Potassium    Collection Time: 03/19/22  6:28 AM   Result Value Ref Range    Potassium 3.8 3.5 - 5.0 mmol/L       "

## 2022-03-19 NOTE — PROGRESS NOTES
CLINICAL NUTRITION SERVICES - REASSESSMENT NOTE      RECOMMENDATIONS FOR MD/PROVIDER TO ORDER:   None     Recommendations Ordered by Registered Dietitian (RD):   Discontinued Ensure clear and gelatein as pt stated he doesn't like them and they are building up in room     Future/Additional Recommendations:   Consider adding Ensure Enlive/Magic cup if pt appetite decreases again or loses more weight     Malnutrition:   Severe in the context of social and environmental circumstances (please carry forward if malnutrition diagnosed)         EVALUATION OF PROGRESS TOWARD GOALS   Diet:  Regular    Nutrition Support:  none    Intake/Tolerance:  Patient is eating 75% meals per nursing records and receiving on average 2244kcals and 96g protein daily.      ASSESSED NUTRITION NEEDS:  Dosing Weight:  53.5 kg    Estimated Energy Needs: 7359-0922 kcals/day (25 - 30 kcals/kg)  Justification: Maintenance  Estimated Protein Needs: 64-80 grams protein/day (1.2 - 1.5 grams of pro/kg)  Justification: Increased needs  Estimated Fluid Needs: 7844-0305 mL/day (25 - 30 mL/kg)   Justification: Maintenance    NEW FINDINGS:   Pt stated he does not like the Ensure Clear or the Gelatein    03/17/22 1416 52 kg (114 lb 10.2 oz)   03/10/22 1300 53.5 kg (118 lb)      Net fluid up 1.7L (3.7lbs) since admit per I/Os    Labs: Cr 0.51, CRP 4.0    Meds: unasyn, thera-vit, klor-con m    GI: LBM 3/19/22    Previous Goals:   Intake > 75% of estimated needs  No weight loss below 118 lb   Evaluation: Not met    Previous Nutrition Diagnosis:   Malnutrition related to cognitive decline as evidenced by 16.9% weight loss in 5 months, intake < 75% > 3 months   Evaluation: Declining      MALNUTRITION  % Weight Loss:  > 10% in 6 months (severe malnutrition)  % Intake:  </= 75% for >3 months (severe malnutrition)  Subcutaneous Fat Loss:  Orbital region moderate depletion  Muscle Loss:  Temporal region moderate depletion  Fluid Retention:  None noted  Did not preform  full physical assessment as pt was anxious    Malnutrition Diagnosis: Severe malnutrition  In Context of:  Environmental or social circumstances    CURRENT NUTRITION DIAGNOSIS  Malnutrition related to cognitive decline as evidenced by 16.9% weight loss in 5 months, intake < 75% > 3 months     INTERVENTIONS  Recommendations / Nutrition Prescription  Discontinued Ensure Clear and Gelatein    Implementation  Medical Food Supplement    Goals  Meet estimated nutrition needs  Maintain weight      MONITORING AND EVALUATION:  Progress towards goals will be monitored and evaluated per protocol and Practice Guidelines, Diet Order, Food intake, Liquid meal replacement or supplement, Vitamin intake, Weight and Biochemical data

## 2022-03-19 NOTE — PLAN OF CARE
Problem: Impaired Wound Healing  Goal: Optimal Wound Healing  Outcome: Ongoing, Progressing  Dressing changed to previous site of left chest tubes - moderate amount of serosanguinous drainage.    Problem: Infection  Goal: Absence of Infection Signs and Symptoms  Outcome: Ongoing, Progressing  Vital signs stable, IV abx given as ordered.    Problem: Fall Injury Risk  Goal: Absence of Fall and Fall-Related Injury  Outcome: Ongoing, Progressing  Bed/chair alarm on for safety - hx of multiple falls.    Alona Cope RN

## 2022-03-19 NOTE — PLAN OF CARE
Problem: Risk for Delirium  Goal: Improved Sleep  Outcome: Ongoing, Progressing     Problem: Urinary Elimination Management  Goal: Effective Urinary Elimination/Continence  Outcome: Ongoing, Progressing   Goal Outcome Evaluation:    Patient used call light or yelled to use the bathroom throughout the night. He only had 1 episode of incontinence overnight.     Patient expressed that he felt well rested overnight.

## 2022-03-19 NOTE — PLAN OF CARE
Problem: Impaired Wound Healing  Goal: Optimal Wound Healing  Outcome: Ongoing, Progressing  Left chest tube dressing saturated with serosanguinous drainage - dressing changed.    Problem: Infection  Goal: Absence of Infection Signs and Symptoms  Outcome: Ongoing, Progressing  Vital signs stable, IV abx given as ordered.  ID following.    Problem: Fall Injury Risk  Goal: Absence of Fall and Fall-Related Injury  3/18/2022 1909 by Alona Cope, RN  Outcome: Ongoing, Progressing  Patient at times impulsive, alarms on for patient safety.    Alona Cope, RN

## 2022-03-19 NOTE — PROGRESS NOTES
Care Management Follow Up    Length of Stay (days): 11    Expected Discharge Date: 03/21/2022     Concerns to be Addressed:       Patient plan of care discussed at interdisciplinary rounds: Yes    Anticipated Discharge Disposition: Transitional Care     Anticipated Discharge Services:  (skilled nursing, therapy services)  Anticipated Discharge DME:  (per therapy, if indicated)    Education Provided on the Discharge Plan:  Yes  Patient/Family in Agreement with the Plan: yes    Referrals Placed by CM/SW: Post Acute Facilities  Additional Information:   Pt accepted at both [facility] and [facility] for Monday admission. CM to follow-up 1st with [place].       KAREN Haynes  03/19/22

## 2022-03-20 LAB
ANION GAP SERPL CALCULATED.3IONS-SCNC: 6 MMOL/L (ref 5–18)
BUN SERPL-MCNC: 10 MG/DL (ref 8–28)
CALCIUM SERPL-MCNC: 7.1 MG/DL (ref 8.5–10.5)
CHLORIDE BLD-SCNC: 111 MMOL/L (ref 98–107)
CO2 SERPL-SCNC: 25 MMOL/L (ref 22–31)
CREAT SERPL-MCNC: 0.55 MG/DL (ref 0.7–1.3)
GFR SERPL CREATININE-BSD FRML MDRD: >90 ML/MIN/1.73M2
GLUCOSE BLD-MCNC: 118 MG/DL (ref 70–125)
GLUCOSE BLDC GLUCOMTR-MCNC: 111 MG/DL (ref 70–99)
GLUCOSE BLDC GLUCOMTR-MCNC: 121 MG/DL (ref 70–99)
POTASSIUM BLD-SCNC: 3.8 MMOL/L (ref 3.5–5)
SODIUM SERPL-SCNC: 142 MMOL/L (ref 136–145)

## 2022-03-20 PROCEDURE — 250N000011 HC RX IP 250 OP 636

## 2022-03-20 PROCEDURE — 36415 COLL VENOUS BLD VENIPUNCTURE: CPT | Performed by: INTERNAL MEDICINE

## 2022-03-20 PROCEDURE — 80048 BASIC METABOLIC PNL TOTAL CA: CPT | Performed by: INTERNAL MEDICINE

## 2022-03-20 PROCEDURE — 120N000001 HC R&B MED SURG/OB

## 2022-03-20 PROCEDURE — 250N000013 HC RX MED GY IP 250 OP 250 PS 637: Performed by: INTERNAL MEDICINE

## 2022-03-20 PROCEDURE — 99232 SBSQ HOSP IP/OBS MODERATE 35: CPT | Performed by: INTERNAL MEDICINE

## 2022-03-20 PROCEDURE — 250N000013 HC RX MED GY IP 250 OP 250 PS 637: Performed by: HOSPITALIST

## 2022-03-20 RX ADMIN — AMPICILLIN SODIUM AND SULBACTAM SODIUM 3 G: 2; 1 INJECTION, POWDER, FOR SOLUTION INTRAMUSCULAR; INTRAVENOUS at 09:49

## 2022-03-20 RX ADMIN — ACETAMINOPHEN 975 MG: 325 TABLET ORAL at 22:09

## 2022-03-20 RX ADMIN — AMPICILLIN SODIUM AND SULBACTAM SODIUM 3 G: 2; 1 INJECTION, POWDER, FOR SOLUTION INTRAMUSCULAR; INTRAVENOUS at 04:04

## 2022-03-20 RX ADMIN — LORAZEPAM 2 MG: 1 TABLET ORAL at 16:15

## 2022-03-20 RX ADMIN — AMPICILLIN SODIUM AND SULBACTAM SODIUM 3 G: 2; 1 INJECTION, POWDER, FOR SOLUTION INTRAMUSCULAR; INTRAVENOUS at 22:09

## 2022-03-20 RX ADMIN — SERTRALINE HYDROCHLORIDE 25 MG: 25 TABLET ORAL at 07:52

## 2022-03-20 RX ADMIN — ACETAMINOPHEN 975 MG: 325 TABLET ORAL at 04:04

## 2022-03-20 RX ADMIN — AMPICILLIN SODIUM AND SULBACTAM SODIUM 3 G: 2; 1 INJECTION, POWDER, FOR SOLUTION INTRAMUSCULAR; INTRAVENOUS at 16:45

## 2022-03-20 RX ADMIN — THERA TABS 1 TABLET: TAB at 09:49

## 2022-03-20 ASSESSMENT — ACTIVITIES OF DAILY LIVING (ADL)
ADLS_ACUITY_SCORE: 19

## 2022-03-20 NOTE — PROGRESS NOTES
[facility] [place] [facility] Progress Note      ASSESSMENT:    Active Problems:    Pleural effusion    Fall    Fall, initial encounter    Traumatic head injury with multiple lacerations, initial encounter    Empyema of left pleural space (H)    Poor dentition      PLAN:   76 year old male from group home with history of hypertension, Intellectual disability, hypertension, OCD, anxiety disorder and mood disorder admitted on 3/8/2022 due to mechanical fall and head laceration. CT C-spine showed pleural fluid filling the left lung apex. Chest x-ray showed moderate to marked left sided pleural fluid collection.           Empyema of left pleural space: likely related to aspiration in the setting of poor dentition and recent fall with head injury   On 3/11/22, CTS performed thoracoscopy with lavage and cleanout left lower lobe and left upper lobe, Left thoracoscopy with conversion to thoracotomy due to longstanding marked empyema requiring complete decortication and complete parietal pleurectomy and placement of chest tubes.   Left pleural fluid culture grew Eikenella corrodens and fusobacterium nucleatum.   -Received IV vancomycin (3/10/22 -3/14/22)  -Continue IV Unasyn while inpatient with plan to transition to Augmentin for 2 weeks at discharge   -Chest tubes removed 3/17/22  -Patient is medically stable for discharge, he has TCU acceptance but cannot admit the patient until 3/21/22  -Plan TCU discharge 3/21/22       Head injury secondary to fall:  Patient presented with mechanical fall from his group home.  CT scan of the head showed questionable subdural hygroma measuring up to 0.9 cm  versus prominence of subarachnoid space overlying the left parietal lobe.  CT C-spine  showed no acute fractures.   -Laceration on his head has been repaired in the ED.  -Fall precautions in place        Anemia: suspect due to ongoing illness, phlebotomy. RN notes black stools but this has been ongoing, no signs of hemodynamic  "instability or clnical concern for GI bleed with rising Hgb  -- transfused 1 U PRBC 3/16 and subsequent hemoglobin checks are stable  -- trend hgb as outpatient          Hypertension:    -Continue home amlodipine and atenolol as BP allows        Mood disorder:  -Continue PTA sertraline, ativan      Severe malnutrition:  --RD following  --Given psychiatry history would avoid appetite stimulant as inpatient, recommend outpatient RD and psychiatry referrals         DVT PPX: SCD           Jay Jay Stroud D.O.              -------------------------------------------------------------------------------------------------------------  SUBJECTIVE: NAD. Denies any nausea, vomiting, abdominal pain, chest pain, SOB, new swelling, fevers, chills, or headache.     Exam:  /77 (BP Location: Left arm)   Pulse 83   Temp 97.5  F (36.4  C) (Axillary)   Resp 18   Ht 1.65 m (5' 4.96\")   Wt 52 kg (114 lb 10.2 oz)   SpO2 94%   BMI 19.10 kg/m    General: NAD  RESPIRATORY: Breathing nonlabored  CARDIOVASCULAR: no le edema bilat.   ABDOMEN: soft and non-tender  NEUROLOGIC: Motor grossly intact, speech clear       Diagnostics Reviewed:      Recent Results (from the past 24 hour(s))   Glucose by meter    Collection Time: 03/19/22  4:42 PM   Result Value Ref Range    GLUCOSE BY METER POCT 158 (H) 70 - 99 mg/dL   Basic metabolic panel    Collection Time: 03/20/22  5:51 AM   Result Value Ref Range    Sodium 142 136 - 145 mmol/L    Potassium 3.8 3.5 - 5.0 mmol/L    Chloride 111 (H) 98 - 107 mmol/L    Carbon Dioxide (CO2) 25 22 - 31 mmol/L    Anion Gap 6 5 - 18 mmol/L    Urea Nitrogen 10 8 - 28 mg/dL    Creatinine 0.55 (L) 0.70 - 1.30 mg/dL    Calcium 7.1 (L) 8.5 - 10.5 mg/dL    Glucose 118 70 - 125 mg/dL    GFR Estimate >90 >60 mL/min/1.73m2   Glucose by meter    Collection Time: 03/20/22  7:49 AM   Result Value Ref Range    GLUCOSE BY METER POCT 121 (H) 70 - 99 mg/dL       "

## 2022-03-20 NOTE — PLAN OF CARE
Problem: Pain Acute  Goal: Acceptable Pain Control and Functional Ability  Outcome: Ongoing, Progressing  Patient denies pain.    Problem: Fall Injury Risk  Goal: Absence of Fall and Fall-Related Injury  Outcome: Ongoing, Progressing  Bed/chair alarm on for patient safety.  Encouraged use of call light.    Problem: Impaired Wound Healing  Goal: Optimal Wound Healing  Outcome: Ongoing, Progressing  Dressing to site of previous chest tubes clean, dry and intact     Problem: Infection  Goal: Absence of Infection Signs and Symptoms  Outcome: Ongoing, Progressing  Vital signs stable IV abs given. ID following.    Alona Cope RN

## 2022-03-20 NOTE — PLAN OF CARE
Problem: Plan of Care - These are the overarching goals to be used throughout the patient stay.    Goal: Absence of Hospital-Acquired Illness or Injury  Intervention: Identify and Manage Fall Risk  Recent Flowsheet Documentation  Taken 3/20/2022 0142 by Samantha Schmitt RN  Safety Promotion/Fall Prevention:   activity supervised   assistive device/personal items within reach   bed alarm on   clutter free environment maintained   fall prevention program maintained   increased rounding and observation   patient and family education   nonskid shoes/slippers when out of bed   room door open   safety round/check completed  Taken 3/19/2022 2043 by Samantha Schmitt, RN  Safety Promotion/Fall Prevention:   activity supervised   assistive device/personal items within reach   bed alarm on   clutter free environment maintained   fall prevention program maintained   increased rounding and observation   patient and family education   nonskid shoes/slippers when out of bed   room door open   safety round/check completed     Problem: Fall Injury Risk  Goal: Absence of Fall and Fall-Related Injury  Outcome: Ongoing, Progressing     Problem: Pain Acute  Goal: Acceptable Pain Control and Functional Ability  Outcome: Ongoing, Progressing     Problem: Infection  Goal: Absence of Infection Signs and Symptoms  Outcome: Ongoing, Progressing   Goal Outcome Evaluation:     Dressing to L chest CDI. Denied pain. Tolerating IV abx. Slept intermittently. K+ protocol, result 3.8  this am. Recheck tomorrow.

## 2022-03-20 NOTE — PROGRESS NOTES
Care Management Follow Up    Length of Stay (days): 12    Expected Discharge Date: 03/21/2022     Concerns to be Addressed:       Patient plan of care discussed at interdisciplinary rounds: Yes    Anticipated Discharge Disposition: Transitional Care     Anticipated Discharge Services:  (skilled nursing, therapy services)  Anticipated Discharge DME:  (per therapy, if indicated)    Patient/family educated on Medicare website which has current facility and service quality ratings:    Education Provided on the Discharge Plan:    Patient/Family in Agreement with the Plan: yes    Referrals Placed by CM/SW: Post Acute Facilities  Private pay costs discussed: Not applicable    Additional Information:  Transport arranged with [employer] wheelchair for 12:30pm for 3/21 to [facility].  Care Manager to call in am to confirm bed availability.      NHUNG Huerta

## 2022-03-21 ENCOUNTER — LAB REQUISITION (OUTPATIENT)
Dept: LAB | Facility: CLINIC | Age: 77
End: 2022-03-21
Payer: MEDICARE

## 2022-03-21 VITALS
WEIGHT: 114.64 LBS | BODY MASS INDEX: 19.1 KG/M2 | OXYGEN SATURATION: 95 % | HEART RATE: 73 BPM | DIASTOLIC BLOOD PRESSURE: 79 MMHG | HEIGHT: 65 IN | RESPIRATION RATE: 18 BRPM | SYSTOLIC BLOOD PRESSURE: 131 MMHG | TEMPERATURE: 98.1 F

## 2022-03-21 DIAGNOSIS — Z11.1 ENCOUNTER FOR SCREENING FOR RESPIRATORY TUBERCULOSIS: ICD-10-CM

## 2022-03-21 LAB
GLUCOSE BLDC GLUCOMTR-MCNC: 105 MG/DL (ref 70–99)
HOLD SPECIMEN: NORMAL
POTASSIUM BLD-SCNC: 3.9 MMOL/L (ref 3.5–5)

## 2022-03-21 PROCEDURE — 250N000013 HC RX MED GY IP 250 OP 250 PS 637: Performed by: INTERNAL MEDICINE

## 2022-03-21 PROCEDURE — 250N000011 HC RX IP 250 OP 636

## 2022-03-21 PROCEDURE — 84132 ASSAY OF SERUM POTASSIUM: CPT | Performed by: INTERNAL MEDICINE

## 2022-03-21 PROCEDURE — 99239 HOSP IP/OBS DSCHRG MGMT >30: CPT | Performed by: INTERNAL MEDICINE

## 2022-03-21 PROCEDURE — 36415 COLL VENOUS BLD VENIPUNCTURE: CPT | Performed by: INTERNAL MEDICINE

## 2022-03-21 PROCEDURE — 250N000013 HC RX MED GY IP 250 OP 250 PS 637: Performed by: HOSPITALIST

## 2022-03-21 RX ADMIN — AMPICILLIN SODIUM AND SULBACTAM SODIUM 3 G: 2; 1 INJECTION, POWDER, FOR SOLUTION INTRAMUSCULAR; INTRAVENOUS at 04:26

## 2022-03-21 RX ADMIN — THERA TABS 1 TABLET: TAB at 08:24

## 2022-03-21 RX ADMIN — ATENOLOL 25 MG: 25 TABLET ORAL at 08:24

## 2022-03-21 RX ADMIN — ACETAMINOPHEN 975 MG: 325 TABLET ORAL at 04:27

## 2022-03-21 RX ADMIN — AMPICILLIN SODIUM AND SULBACTAM SODIUM 3 G: 2; 1 INJECTION, POWDER, FOR SOLUTION INTRAMUSCULAR; INTRAVENOUS at 10:03

## 2022-03-21 RX ADMIN — SERTRALINE HYDROCHLORIDE 25 MG: 25 TABLET ORAL at 08:24

## 2022-03-21 RX ADMIN — AMLODIPINE BESYLATE 5 MG: 5 TABLET ORAL at 08:24

## 2022-03-21 ASSESSMENT — ACTIVITIES OF DAILY LIVING (ADL)
ADLS_ACUITY_SCORE: 19

## 2022-03-21 NOTE — PLAN OF CARE
Problem: Risk for Delirium  Goal: Improved Behavioral Control  Outcome: Ongoing, Progressing  Goal: Improved Sleep  Outcome: Ongoing, Progressing    Patient rested well overnight, VSS, ready for discharge today.

## 2022-03-21 NOTE — DISCHARGE SUMMARY
[facility] MEDICINE  DISCHARGE SUMMARY      Primary Care Physician: Lester Sharma              Admission Date: 3/8/2022    Discharge Provider: Jay Jay Stroud DO Discharge Date: 3/21/2022    Diet: see discharge orders below Code Status: Prior    Activity: as tolerated       Condition at Discharge: Stable      REASON FOR ADMISSION (See Admission Note for Details)      SOB    PRINCIPAL DISCHARGE DIAGNOSIS    Active Problems:    Pleural effusion    Fall    Fall, initial encounter    Traumatic head injury with multiple lacerations, initial encounter    Empyema of left pleural space (H)    Poor dentition        SIGNIFICANT FINDINGS (Imaging, labs):      See below    PENDING LABS      None    PROCEDURES ( this hospitalization only)       See below    RECOMMENDATION FOR F/U VISIT      See below    DISPOSITION ( home, home care, TCU...)      Home    SUMMARY OF HOSPITAL COURSE:       76 year old male from group home with history of hypertension, Intellectual disability, hypertension, OCD, anxiety disorder and mood disorder admitted on 3/8/2022 due to mechanical fall and head laceration. CT C-spine showed pleural fluid filling the left lung apex. Chest x-ray showed moderate to marked left sided pleural fluid collection.            Empyema of left pleural space: likely related to aspiration in the setting of poor dentition and recent fall with head injury   On 3/11/22, CTS performed thoracoscopy with lavage and cleanout left lower lobe and left upper lobe, Left thoracoscopy with conversion to thoracotomy due to longstanding marked empyema requiring complete decortication and complete parietal pleurectomy and placement of chest tubes.   Left pleural fluid culture grew Eikenella corrodens and fusobacterium nucleatum.   -Received IV vancomycin (3/10/22 -3/14/22)  -Continue IV Unasyn while inpatient with plan to transition to Augmentin for 2 weeks at discharge   -Chest tubes  removed 3/17/22  -Patient is medically stable for discharge, he has TCU acceptance but cannot admit the patient until 3/21/22  -Plan TCU discharge 3/21/22        Head injury secondary to fall:  Patient presented with mechanical fall from his group home.  CT scan of the head showed questionable subdural hygroma measuring up to 0.9 cm  versus prominence of subarachnoid space overlying the left parietal lobe.  CT C-spine  showed no acute fractures.   -Laceration on his head has been repaired in the ED.  -Fall precautions in place           Anemia: suspect due to ongoing illness, phlebotomy. RN notes black stools but this has been ongoing, no signs of hemodynamic instability or clnical concern for GI bleed with rising Hgb  -- transfused 1 U PRBC 3/16 and subsequent hemoglobin checks are stable  -- trend hgb as outpatient           Hypertension:    -Continue home amlodipine and atenolol as BP allows         Mood disorder:  -Continue PTA sertraline, ativan         76 year old male from group home with history of hypertension, Intellectual disability, hypertension, OCD, anxiety disorder and mood disorder admitted on 3/8/2022 due to mechanical fall and head laceration. CT C-spine showed pleural fluid filling the left lung apex. Chest x-ray showed moderate to marked left sided pleural fluid collection.            Empyema of left pleural space: likely related to aspiration in the setting of poor dentition and recent fall with head injury   On 3/11/22, CTS performed thoracoscopy with lavage and cleanout left lower lobe and left upper lobe, Left thoracoscopy with conversion to thoracotomy due to longstanding marked empyema requiring complete decortication and complete parietal pleurectomy and placement of chest tubes.   Left pleural fluid culture grew Eikenella corrodens and fusobacterium nucleatum.   -Patient received IV Unasyn and vancomycin while inpatient and plan to transition to Augmentin for 2 weeks at discharge per ID  recommendations  -Chest tubes removed 3/17/22  -Medically stable for TCU discharge          Head injury secondary to fall:  Patient presented with mechanical fall from his group home.  CT scan of the head showed questionable subdural hygroma measuring up to 0.9 cm  versus prominence of subarachnoid space overlying the left parietal lobe.  CT C-spine  showed no acute fractures.   -Laceration on his head has been repaired in the ED.        Anemia: suspect due to ongoing illness, phlebotomy. RN notes black stools but this has been ongoing, no signs of hemodynamic instability or clnical concern for GI bleed with rising Hgb  -- transfused 1 U PRBC 3/16 and subsequent hemoglobin checks are stable  -- trend hgb as outpatient           Hypertension:    -Continue home amlodipine and atenolol         Mood disorder:  -Continue PTA sertraline, ativan        Severe malnutrition:  --Given psychiatry history would avoid appetite stimulant as inpatient, recommend outpatient RD and psychiatry referrals    Discharge Medications with Med changes:         Review of your medicines      START taking      Dose / Directions   acetaminophen 500 MG tablet  Commonly known as: TYLENOL  Used for: Fall, initial encounter      Dose: 1,000 mg  Take 2 tablets (1,000 mg) by mouth every 8 hours as needed for mild pain  Refills: 0     amoxicillin-clavulanate 875-125 MG tablet  Commonly known as: AUGMENTIN  Used for: Empyema of left pleural space (H)      Dose: 1 tablet  Take 1 tablet by mouth 2 times daily for 14 days  Quantity: 28 tablet  Refills: 0        CONTINUE these medicines which have NOT CHANGED      Dose / Directions   amLODIPine 5 MG tablet  Commonly known as: NORVASC  Used for: Hypertension, unspecified type      Dose: 5 mg  Take 1 tablet (5 mg) by mouth daily  Quantity: 30 tablet  Refills: 0     atenolol 25 MG tablet  Commonly known as: TENORMIN  Used for: Hypertension, unspecified type      Dose: 25 mg  Take 1 tablet (25 mg) by mouth  daily  Quantity: 30 tablet  Refills: 0     LORazepam 2 MG tablet  Commonly known as: ATIVAN  Used for: Anxiety      Dose: 2 mg  Take 1 tablet (2 mg) by mouth daily at 4pm  Quantity: 60 tablet  Refills: 0     sertraline 25 MG tablet  Commonly known as: ZOLOFT  Used for: Anxiety      Dose: 25 mg  Take 1 tablet (25 mg) by mouth daily  Quantity: 30 tablet  Refills: 0           Where to get your medicines      Some of these will need a paper prescription and others can be bought over the counter. Ask your nurse if you have questions.    Bring a paper prescription for each of these medications    amLODIPine 5 MG tablet    amoxicillin-clavulanate 875-125 MG tablet    atenolol 25 MG tablet    LORazepam 2 MG tablet    sertraline 25 MG tablet  You don't need a prescription for these medications    acetaminophen 500 MG tablet                         Discharge Procedure Orders   General info for SNF   Order Comments: Length of Stay Estimate: Short Term Care: Estimated # of Days <30  Condition at Discharge: Stable  Level of care:skilled   Rehabilitation Potential: Good  Admission H&P remains valid and up-to-date: Yes  Recent Chemotherapy: N/A  Use Nursing Home Standing Orders: Yes     Mantoux instructions   Order Comments: Give two-step Mantoux (PPD) Per Facility Policy Yes     Follow Up and recommended labs and tests   Order Comments: Follow up with Nursing home physician.     Activity - Up with nursing assistance     Order Specific Question Answer Comments   Is discharge order? Yes      Reason for your hospital stay   Order Comments: Pleural effusion     Physical Therapy Adult Consult   Order Comments: Evaluate and treat as clinically indicated.    Reason: Deconditioning     Occupational Therapy Adult Consult   Order Comments: Evaluate and treat as clinically indicated.    Reason:  deconditioning     Advance Diet as Tolerated   Order Comments: Follow this diet upon discharge: Orders Placed This Encounter       "Snacks/Supplements Adult: Ensure Clear; With Meals      Snacks/Supplements Adult: Gelatein Plus; Between Meals      Regular Diet Adult     Order Specific Question Answer Comments   Is discharge order? Yes          Subjective       NAD. Denies any complaints.    Examination      Vital Signs in last 24 hours:   Vital signs:  Temp: 98.1  F (36.7  C) Temp src: Oral BP: 131/79 Pulse: 73   Resp: 18 SpO2: 95 % O2 Device: None (Room air) Oxygen Delivery: 1 LPM Height: 165 cm (5' 4.96\") Weight: 52 kg (114 lb 10.2 oz)  Estimated body mass index is 19.1 kg/m  as calculated from the following:    Height as of this encounter: 1.65 m (5' 4.96\").    Weight as of this encounter: 52 kg (114 lb 10.2 oz).            General: NAD  RESPIRATORY: Respirations nonlabored  CARDIOVASCULAR: No le edema bilat.  ABDOMEN: soft, non-tender   NEUROLOGIC: No focal arm or leg weakness, speech is clear      Please see EMR for more detailed significant labs, imaging, consultant notes etc.  Total time spent on discharge: >30 minutes    Jay Jay Stroud D.O.        CC:Lester Sharma        "

## 2022-03-21 NOTE — PROGRESS NOTES
Care Management Discharge Note    Discharge Date: 03/21/2022       Discharge Disposition: Transitional Care    Discharge Services: None    Discharge DME: None    Discharge Transportation: health plan transportation    Private pay costs discussed: transportation costs    PAS Confirmation Code:  ([id number])  Patient/family educated on Medicare website which has current facility and service quality ratings: yes    Education Provided on the Discharge Plan:    Persons Notified of Discharge Plans: TCU admissions, nurse,   Patient/Family in Agreement with the Plan: yes    Handoff Referral Completed: Yes    Additional Information:  Plan to discharge today 3/21 to [facility] TCU via [facility] Wheelchair at 12:30 PM. Spoke with admissions at [facility] and confirmed plan for discharge today; will have orders faxed over. Left message with Nelda Bowden, [phone], [facility]  to confirm she was aware patient was going to [facility]CU and not [facility], provided call back extension [phone]. Spoke with patients brother and sister in law to provide update on plan to discharge today via Wheelchair transport. Provided main number to TCU for family to call and check in. No other questions at this time.         Jinny Harp RN

## 2022-03-21 NOTE — PLAN OF CARE
"  Problem: Plan of Care - These are the overarching goals to be used throughout the patient stay.    Goal: Plan of Care Review/Shift Note  Description: The Plan of Care Review/Shift note should be completed every shift.  The Outcome Evaluation is a brief statement about your assessment that the patient is improving, declining, or no change.  This information will be displayed automatically on your shift note.  3/21/2022 1047 by Addison Garzon RN  Outcome: Met  3/21/2022 1045 by Addison Garzon RN  Flowsheets (Taken 3/21/2022 1045)  Plan of Care Reviewed With: patient  Goal: Patient-Specific Goal (Individualized)  Description: You can add care plan individualizations to a care plan. Examples of Individualization might be:  \"Parent requests to be called daily at 9am for status\", \"I have a hard time hearing out of my right ear\", or \"Do not touch me to wake me up as it startles me\".  Outcome: Met  Goal: Absence of Hospital-Acquired Illness or Injury  Outcome: Met  Intervention: Identify and Manage Fall Risk  Recent Flowsheet Documentation  Taken 3/21/2022 0811 by Addison Garzon RN  Safety Promotion/Fall Prevention:   activity supervised   bed alarm on   chair alarm on  Intervention: Prevent Skin Injury  Recent Flowsheet Documentation  Taken 3/21/2022 0811 by Addison Garzon RN  Body Position: position maintained  Intervention: Prevent and Manage VTE (Venous Thromboembolism) Risk  Recent Flowsheet Documentation  Taken 3/21/2022 0811 by Addison Garzon RN  Activity Management:   activity adjusted per tolerance   activity encouraged  Goal: Optimal Comfort and Wellbeing  Outcome: Met  Goal: Readiness for Transition of Care  Outcome: Met     Problem: Risk for Delirium  Goal: Optimal Coping  Outcome: Met  Goal: Improved Behavioral Control  Outcome: Met  Goal: Improved Attention and Thought Clarity  Outcome: Met  Goal: Improved Sleep  Outcome: Met     Problem: Fall Injury Risk  Goal: Absence of Fall and Fall-Related " Injury  Outcome: Met  Intervention: Identify and Manage Contributors  Recent Flowsheet Documentation  Taken 3/21/2022 0811 by Addison Garzon RN  Medication Review/Management: medications reviewed  Intervention: Promote Injury-Free Environment  Recent Flowsheet Documentation  Taken 3/21/2022 0811 by Addison Garzon RN  Safety Promotion/Fall Prevention:   activity supervised   bed alarm on   chair alarm on     Problem: Pain Acute  Goal: Acceptable Pain Control and Functional Ability  Outcome: Met  Intervention: Prevent or Manage Pain  Recent Flowsheet Documentation  Taken 3/21/2022 0811 by Addison Garzon RN  Medication Review/Management: medications reviewed     Problem: Infection  Goal: Absence of Infection Signs and Symptoms  Outcome: Met     Problem: Urinary Elimination Management  Goal: Effective Urinary Elimination/Continence  Outcome: Met     Problem: Impaired Wound Healing  Goal: Optimal Wound Healing  Outcome: Met  Intervention: Promote Wound Healing  Recent Flowsheet Documentation  Taken 3/21/2022 0811 by Addison Garzon RN  Activity Management:   activity adjusted per tolerance   activity encouraged   Goal Outcome Evaluation:    Plan of Care Reviewed With: patient

## 2022-03-22 ENCOUNTER — PATIENT OUTREACH (OUTPATIENT)
Dept: CARE COORDINATION | Facility: CLINIC | Age: 77
End: 2022-03-22

## 2022-03-22 ENCOUNTER — TRANSITIONAL CARE UNIT VISIT (OUTPATIENT)
Dept: GERIATRICS | Facility: CLINIC | Age: 77
End: 2022-03-22
Payer: MEDICARE

## 2022-03-22 VITALS
DIASTOLIC BLOOD PRESSURE: 73 MMHG | HEART RATE: 95 BPM | SYSTOLIC BLOOD PRESSURE: 126 MMHG | OXYGEN SATURATION: 91 % | TEMPERATURE: 96.8 F | RESPIRATION RATE: 22 BRPM | WEIGHT: 122.6 LBS | BODY MASS INDEX: 20.93 KG/M2 | HEIGHT: 64 IN

## 2022-03-22 DIAGNOSIS — J86.9 EMPYEMA OF LEFT PLEURAL SPACE (H): Primary | ICD-10-CM

## 2022-03-22 DIAGNOSIS — Z71.89 OTHER SPECIFIED COUNSELING: ICD-10-CM

## 2022-03-22 DIAGNOSIS — I10 ESSENTIAL HYPERTENSION: ICD-10-CM

## 2022-03-22 DIAGNOSIS — R53.81 PHYSICAL DECONDITIONING: ICD-10-CM

## 2022-03-22 DIAGNOSIS — F91.9 DISRUPTIVE BEHAVIOR DISORDER: ICD-10-CM

## 2022-03-22 DIAGNOSIS — F60.5 OBSESSIVE-COMPULSIVE PERSONALITY DISORDER (H): ICD-10-CM

## 2022-03-22 DIAGNOSIS — F41.1 GENERALIZED ANXIETY DISORDER: ICD-10-CM

## 2022-03-22 PROCEDURE — 36415 COLL VENOUS BLD VENIPUNCTURE: CPT | Performed by: NURSE PRACTITIONER

## 2022-03-22 PROCEDURE — 86481 TB AG RESPONSE T-CELL SUSP: CPT | Performed by: NURSE PRACTITIONER

## 2022-03-22 PROCEDURE — 99309 SBSQ NF CARE MODERATE MDM 30: CPT | Performed by: NURSE PRACTITIONER

## 2022-03-22 PROCEDURE — P9604 ONE-WAY ALLOW PRORATED TRIP: HCPCS | Performed by: NURSE PRACTITIONER

## 2022-03-22 NOTE — PROGRESS NOTES
Clinic Care Coordination Contact    Background: Care Coordination referral placed from \[facility]\"" discharge report for reason of patient meeting criteria for a TCM outreach call by [facility] team.    Assessment: Upon chart review, [facility] Team member will cancel/close the referral for TCM outreach due to reason below:    Patient is not established within [facility] Primary Care. Upon chart review, [facility] Team member noted patient discharged to TCU    Plan: Care Coordination referral for TCM outreach canceled.    Brandee Figueredo MA  [facility], [facility]

## 2022-03-22 NOTE — PROGRESS NOTES
[facility] GERIATRICS    PRIMARY CARE PROVIDER AND CLINIC:  Lester Sharma MD, [facility] [place]  [address] / [place]*  Chief Complaint   Patient presents with     Hospital F/U      [place] Medical Record Number:  [id number]  Place of Service where encounter took place:  [facility] ([facility]) [[id number]]    Perry Betancourt  is a 76 year old  (1945), admitted to the above facility from  [facility]. Hospital stay 3/8/22 through 3/21/22.     Patient with PMH intellectual disability, HTN, OCD, anxiety, and mood disorder. He presented from his group home after a mechanical fall. CT showed empyema of left pleural space. On 3/11 he underwent thoracoscopy with lavage and cleanout LLL and KADEEM, converted to thoracotomy and placement of chest tubes.     HPI:    Patient has been very restless since admission. He will often call out that he has to go to the bathroom, but once there will not need to go. He says he was told that he can't go to therapy, asks what he should be doing. He says he feels fine, denies any respiratory symptoms. HPI is challenging due to him constantly asking about going to the bathroom, trying to get up. -120s/70s, HR 70-80s.     CODE STATUS/ADVANCE DIRECTIVES DISCUSSION:  CPR/Full code   ALLERGIES: No Known Allergies   PAST MEDICAL HISTORY: No past medical history on file.   PAST SURGICAL HISTORY:   has a past surgical history that includes Thoracoscopy (Left, 3/11/2022) and Thoracotomy (Left, 3/11/2022).  FAMILY HISTORY: family history is not on file.  SOCIAL HISTORY:   reports that he has never smoked. He does not have any smokeless tobacco history on file.  Patient's living condition: [facility]    Post Discharge Medication Reconciliation Status: discharge medications reconciled, continue medications without change  Current Outpatient Medications   Medication Sig     acetaminophen (TYLENOL) 500 MG tablet Take 2  "tablets (1,000 mg) by mouth every 8 hours as needed for mild pain     amLODIPine (NORVASC) 5 MG tablet Take 1 tablet (5 mg) by mouth daily     amoxicillin-clavulanate (AUGMENTIN) 875-125 MG tablet Take 1 tablet by mouth 2 times daily for 14 days     atenolol (TENORMIN) 25 MG tablet Take 1 tablet (25 mg) by mouth daily     LORazepam (ATIVAN) 2 MG tablet Take 1 tablet (2 mg) by mouth daily at 4pm     sertraline (ZOLOFT) 25 MG tablet Take 1 tablet (25 mg) by mouth daily     No current facility-administered medications for this visit.       ROS:  Limited secondary to cognitive impairment but today pt reports 4 point ROS including Respiratory, CV, GI and , other than that noted in the HPI,  is negative    Vitals:  /73   Pulse 95   Temp 96.8  F (36  C)   Resp 22   Ht 1.626 m (5' 4\")   Wt 55.6 kg (122 lb 9.6 oz)   SpO2 91%   BMI 21.04 kg/m    Exam:  GENERAL APPEARANCE:  Alert, thin, anxious  EYES:  EOM normal, conjunctiva and lids normal  RESP:  no respiratory distress  CV:  no edema  ABDOMEN:  no distension  SKIN:  Inspection of skin and subcutaneous tissue baseline  PSYCH:  insight and judgement impaired, memory impaired , anxious    Lab/Diagnostic data:  Recent labs in [facility] reviewed by me today.       ASSESSMENT/PLAN:  (J86.9) Empyema of left pleural space (H)  (primary encounter diagnosis)  Comment: No sign of respiratory symptoms. Unable to auscultate lungs due to restlessness, but he was talking for quite a long period without any SOB.   Plan: Continue current POC with no changes at this time and adjustments as needed.    (R53.81) Physical deconditioning  Comment: Due to acute illness  Plan: PT/OT eval and treat, discharge planning per their recommendations.    (I10) Essential hypertension  Comment: Chronic, controlled  Plan: Continue current POC with no changes at this time and adjustments as needed.    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 His behavior since admission is consistent with any/all of these. Expect his behavior will be challenging to control while at TCU. It will be in his best interest to return to his group home as soon as he is safe to do so.   Plan: Continue current POC with no changes at this time and adjustments as needed.      Electronically signed by:  ALLEN Dawson [facility]  Phone: [phone]

## 2022-03-22 NOTE — LETTER
3/22/2022        RE: Perry Betancourt  [address] [place] [address]        [facility] GERIATRICS    PRIMARY CARE PROVIDER AND CLINIC:  Lester Sharma MD, [facility] [place]  [address] [address] [address] / [place]*  Chief Complaint   Patient presents with     Hospital F/U      [place] Medical Record Number:  [id number]  Place of Service where encounter took place:  [facility] ([facility]) [[id number]]    Perry Betancourt  is a 76 year old  (1945), admitted to the above facility from  [facility]. Hospital stay 3/8/22 through 3/21/22.     Patient with PMH intellectual disability, HTN, OCD, anxiety, and mood disorder. He presented from his group home after a mechanical fall. CT showed empyema of left pleural space. On 3/11 he underwent thoracoscopy with lavage and cleanout LLL and KADEEM, converted to thoracotomy and placement of chest tubes.     HPI:    Patient has been very restless since admission. He will often call out that he has to go to the bathroom, but once there will not need to go. He says he was told that he can't go to therapy, asks what he should be doing. He says he feels fine, denies any respiratory symptoms. HPI is challenging due to him constantly asking about going to the bathroom, trying to get up. -120s/70s, HR 70-80s.     CODE STATUS/ADVANCE DIRECTIVES DISCUSSION:  CPR/Full code   ALLERGIES: No Known Allergies   PAST MEDICAL HISTORY: No past medical history on file.   PAST SURGICAL HISTORY:   has a past surgical history that includes Thoracoscopy (Left, 3/11/2022) and Thoracotomy (Left, 3/11/2022).  FAMILY HISTORY: family history is not on file.  SOCIAL HISTORY:   reports that he has never smoked. He does not have any smokeless tobacco history on file.  Patient's living condition: [facility] [place]    Post Discharge Medication Reconciliation Status: discharge medications reconciled, continue medications without change  Current  "Outpatient Medications   Medication Sig     acetaminophen (TYLENOL) 500 MG tablet Take 2 tablets (1,000 mg) by mouth every 8 hours as needed for mild pain     amLODIPine (NORVASC) 5 MG tablet Take 1 tablet (5 mg) by mouth daily     amoxicillin-clavulanate (AUGMENTIN) 875-125 MG tablet Take 1 tablet by mouth 2 times daily for 14 days     atenolol (TENORMIN) 25 MG tablet Take 1 tablet (25 mg) by mouth daily     LORazepam (ATIVAN) 2 MG tablet Take 1 tablet (2 mg) by mouth daily at 4pm     sertraline (ZOLOFT) 25 MG tablet Take 1 tablet (25 mg) by mouth daily     No current facility-administered medications for this visit.       ROS:  Limited secondary to cognitive impairment but today pt reports 4 point ROS including Respiratory, CV, GI and , other than that noted in the HPI,  is negative    Vitals:  /73   Pulse 95   Temp 96.8  F (36  C)   Resp 22   Ht 1.626 m (5' 4\")   Wt 55.6 kg (122 lb 9.6 oz)   SpO2 91%   BMI 21.04 kg/m    Exam:  GENERAL APPEARANCE:  Alert, thin, anxious  EYES:  EOM normal, conjunctiva and lids normal  RESP:  no respiratory distress  CV:  no edema  ABDOMEN:  no distension  SKIN:  Inspection of skin and subcutaneous tissue baseline  PSYCH:  insight and judgement impaired, memory impaired , anxious    Lab/Diagnostic data:  Recent labs in [facility] reviewed by me today.       ASSESSMENT/PLAN:  (J86.9) Empyema of left pleural space (H)  (primary encounter diagnosis)  Comment: No sign of respiratory symptoms. Unable to auscultate lungs due to restlessness, but he was talking for quite a long period without any SOB.   Plan: Continue current POC with no changes at this time and adjustments as needed.    (R53.81) Physical deconditioning  Comment: Due to acute illness  Plan: PT/OT eval and treat, discharge planning per their recommendations.    (I10) Essential hypertension  Comment: Chronic, controlled  Plan: Continue current POC with no changes at this time and adjustments as needed.    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 His behavior since admission is consistent with any/all of these. Expect his behavior will be challenging to control while at TCU. It will be in his best interest to return to his group home as soon as he is safe to do so.   Plan: Continue current POC with no changes at this time and adjustments as needed.      Electronically signed by:  ALLEN Dawson [facility]  Phone: [phone]

## 2022-03-23 PROBLEM — F60.5 OBSESSIVE-COMPULSIVE PERSONALITY DISORDER (H): Status: ACTIVE | Noted: 2022-03-23

## 2022-03-23 PROBLEM — R39.81 FUNCTIONAL INCONTINENCE: Status: ACTIVE | Noted: 2020-04-09

## 2022-03-23 PROBLEM — F91.9 DISRUPTIVE BEHAVIOR DISORDER: Status: ACTIVE | Noted: 2017-10-19

## 2022-03-23 LAB
GAMMA INTERFERON BACKGROUND BLD IA-ACNC: 0.01 IU/ML
M TB IFN-G BLD-IMP: NEGATIVE
M TB IFN-G CD4+ BCKGRND COR BLD-ACNC: 1.06 IU/ML
MITOGEN IGNF BCKGRD COR BLD-ACNC: 0.02 IU/ML
MITOGEN IGNF BCKGRD COR BLD-ACNC: 0.03 IU/ML
QUANTIFERON MITOGEN: 1.07 IU/ML
QUANTIFERON NIL TUBE: 0.01 IU/ML
QUANTIFERON TB1 TUBE: 0.03 IU/ML
QUANTIFERON TB2 TUBE: 0.04

## 2022-03-29 ENCOUNTER — OFFICE VISIT (OUTPATIENT)
Dept: GERIATRICS | Facility: CLINIC | Age: 77
End: 2022-03-29
Payer: MEDICARE

## 2022-03-29 VITALS
RESPIRATION RATE: 18 BRPM | TEMPERATURE: 97.8 F | OXYGEN SATURATION: 96 % | BODY MASS INDEX: 20.01 KG/M2 | HEIGHT: 64 IN | HEART RATE: 78 BPM | WEIGHT: 117.2 LBS | DIASTOLIC BLOOD PRESSURE: 74 MMHG | SYSTOLIC BLOOD PRESSURE: 126 MMHG

## 2022-03-29 DIAGNOSIS — I10 ESSENTIAL HYPERTENSION: ICD-10-CM

## 2022-03-29 DIAGNOSIS — F41.1 GENERALIZED ANXIETY DISORDER: ICD-10-CM

## 2022-03-29 DIAGNOSIS — F91.9 DISRUPTIVE BEHAVIOR DISORDER: ICD-10-CM

## 2022-03-29 DIAGNOSIS — J86.9 EMPYEMA OF LEFT PLEURAL SPACE (H): Primary | ICD-10-CM

## 2022-03-29 DIAGNOSIS — R53.81 PHYSICAL DECONDITIONING: ICD-10-CM

## 2022-03-29 DIAGNOSIS — F60.5 OBSESSIVE-COMPULSIVE PERSONALITY DISORDER (H): ICD-10-CM

## 2022-03-29 PROCEDURE — 99316 NF DSCHRG MGMT 30 MIN+: CPT | Performed by: NURSE PRACTITIONER

## 2022-03-29 NOTE — LETTER
3/29/2022        RE: Perry Betancourt  [address] [place] [address]        [facility] GERIATRICS DISCHARGE SUMMARY  PATIENT'S NAME: Perry Betancourt  YOB: 1945  MEDICAL RECORD NUMBER:  [id number]  Place of Service where encounter took place:  [facility] ([facility]) [[id number]]    PRIMARY CARE PROVIDER AND CLINIC RESPONSIBLE AFTER TRANSFER:   Lester Sharma MD, [facility] [place]  [address] / [place]*    Non-FMG Provider     Transferring providers: ALLEN Dawson CNP, Loly Appiah MD  Recent Hospitalization/ED:  [facility] stay 3/8/22 to 3/21/22.  Date of SNF Admission: March 21, 2022  Date of SNF (anticipated) Discharge: April 01, 2022  Discharged to: previous group home  Cognitive Scores: BIMS: 2/15  Physical Function: Ambulating 225 ft with no assistive device  DME: No new DME needed    CODE STATUS/ADVANCE DIRECTIVES DISCUSSION:  Full Code   ALLERGIES: Patient has no known allergies.    NURSING FACILITY COURSE   Medication Changes/Rationale:     none    Summary of nursing facility stay:   [facility] stay 3/8/22 through 3/21/22. Patient with PMH intellectual disability, HTN, OCD, anxiety, and mood disorder. He presented from his group home after a mechanical fall. CT showed empyema of left pleural space. On 3/11 he underwent thoracoscopy with lavage and cleanout LLL and KADEEM, converted to thoracotomy and placement of chest tubes.     Empyema of left pleural space (H)  No respiratory symptoms. All chest incisions/tube sites well healed.     Physical deconditioning  Patient requires cues to stay on task and supervision, but is walking without an assistive device at least 225 feet. No home care needed    Essential hypertension  Controlled. No changes were made to medications at [facility]    Obsessive-compulsive personality disorder (H)  Generalized anxiety disorder  Disruptive behavior  "disorder  Patient is impulsive, needs repeated redirection and cues. He gets fixated on subjects and will ask the same question over and over. Behavior is consistent with above diagnoses and history. It does not appear that he is acutely encephalopathic.      Discharge Medications:    Current Outpatient Medications   Medication Sig Dispense Refill     acetaminophen (TYLENOL) 500 MG tablet Take 2 tablets (1,000 mg) by mouth every 8 hours as needed for mild pain       amLODIPine (NORVASC) 5 MG tablet Take 1 tablet (5 mg) by mouth daily 30 tablet 0     amoxicillin-clavulanate (AUGMENTIN) 875-125 MG tablet Take 1 tablet by mouth 2 times daily for 14 days 28 tablet 0     atenolol (TENORMIN) 25 MG tablet Take 1 tablet (25 mg) by mouth daily 30 tablet 0     LORazepam (ATIVAN) 2 MG tablet Take 1 tablet (2 mg) by mouth daily at 4pm 60 tablet 0     sertraline (ZOLOFT) 25 MG tablet Take 1 tablet (25 mg) by mouth daily 30 tablet 0        Controlled medications:   remaining lorazepam to be sent with patient     Past Medical History: No past medical history on file.  Physical Exam:   Vitals: /74   Pulse 78   Temp 97.8  F (36.6  C)   Resp 18   Ht 1.626 m (5' 4\")   Wt 53.2 kg (117 lb 3.2 oz)   SpO2 96%   BMI 20.12 kg/m    BMI: Body mass index is 20.12 kg/m .  GENERAL APPEARANCE:  Alert, anxious  EYES:  EOM normal, conjunctiva and lids normal  RESP:  respiratory effort and palpation of chest normal, lungs clear to auscultation , no respiratory distress  CV:  Palpation and auscultation of heart done , regular rate and rhythm, no murmur, rub, or gallop, no edema  ABDOMEN:  bowel sounds normal, soft, non-tender  SKIN:  Inspection of skin and subcutaneous tissue baseline  PSYCH:  insight and judgement impaired, memory impaired      SNF labs: Labs done in SNF are in [place] EPIC. Please refer to them using EPIC/Care Everywhere.    DISCHARGE PLAN:    Follow up labs: No labs orders/due    Medical Follow Up:      Follow up " with primary care provider in 1-2 weeks    Discharge Services: No therapy or home care recommended.       TOTAL DISCHARGE TIME:   Greater than 30 minutes  Electronically signed by:  ALLEN Dawson [facility]  Phone: [phone]

## 2022-03-29 NOTE — PROGRESS NOTES
[facility] GERIATRICS DISCHARGE SUMMARY  PATIENT'S NAME: Perry Betancourt  YOB: 1945  MEDICAL RECORD NUMBER:  [id number]  Place of Service where encounter took place:  [facility] () [[id number]]    PRIMARY CARE PROVIDER AND CLINIC RESPONSIBLE AFTER TRANSFER:   Lester Sharma MD, [facility] [place]  [address] / [place]*    Non-FMG Provider     Transferring providers: ALLEN Dawson CNP, Loly Appiah MD  Recent Hospitalization/ED:  [facility] stay 3/8/22 to 3/21/22.  Date of SNF Admission: March 21, 2022  Date of SNF (anticipated) Discharge: April 01, 2022  Discharged to: previous group home  Cognitive Scores: BIMS: 2/15  Physical Function: Ambulating 225 ft with no assistive device  DME: No new DME needed    CODE STATUS/ADVANCE DIRECTIVES DISCUSSION:  Full Code   ALLERGIES: Patient has no known allergies.    NURSING FACILITY COURSE   Medication Changes/Rationale:     none    Summary of nursing facility stay:   [facility] stay 3/8/22 through 3/21/22. Patient with PMH intellectual disability, HTN, OCD, anxiety, and mood disorder. He presented from his group home after a mechanical fall. CT showed empyema of left pleural space. On 3/11 he underwent thoracoscopy with lavage and cleanout LLL and KADEEM, converted to thoracotomy and placement of chest tubes.     Empyema of left pleural space (H)  No respiratory symptoms. All chest incisions/tube sites well healed.     Physical deconditioning  Patient requires cues to stay on task and supervision, but is walking without an assistive device at least 225 feet. No home care needed    Essential hypertension  Controlled. No changes were made to medications at TCU    Obsessive-compulsive personality disorder (H)  Generalized anxiety disorder  Disruptive behavior disorder  Patient is impulsive, needs repeated redirection and cues. He gets fixated on  "subjects and will ask the same question over and over. Behavior is consistent with above diagnoses and history. It does not appear that he is acutely encephalopathic.      Discharge Medications:    Current Outpatient Medications   Medication Sig Dispense Refill     acetaminophen (TYLENOL) 500 MG tablet Take 2 tablets (1,000 mg) by mouth every 8 hours as needed for mild pain       amLODIPine (NORVASC) 5 MG tablet Take 1 tablet (5 mg) by mouth daily 30 tablet 0     atenolol (TENORMIN) 25 MG tablet Take 1 tablet (25 mg) by mouth daily 30 tablet 0     sertraline (ZOLOFT) 25 MG tablet Take 1 tablet (25 mg) by mouth daily 30 tablet 0     LORazepam (ATIVAN) 2 MG tablet Take 1 tablet (2 mg) by mouth daily at 4pm 30 tablet 0        Controlled medications:   remaining lorazepam to be sent with patient     Past Medical History: History reviewed. No pertinent past medical history.  Physical Exam:   Vitals: /74   Pulse 78   Temp 97.8  F (36.6  C)   Resp 18   Ht 1.626 m (5' 4\")   Wt 53.2 kg (117 lb 3.2 oz)   SpO2 96%   BMI 20.12 kg/m    BMI: Body mass index is 20.12 kg/m .  GENERAL APPEARANCE:  Alert, anxious  EYES:  EOM normal, conjunctiva and lids normal  RESP:  respiratory effort and palpation of chest normal, lungs clear to auscultation , no respiratory distress  CV:  Palpation and auscultation of heart done , regular rate and rhythm, no murmur, rub, or gallop, no edema  ABDOMEN:  bowel sounds normal, soft, non-tender  SKIN:  Inspection of skin and subcutaneous tissue baseline  PSYCH:  insight and judgement impaired, memory impaired      SNF labs: Labs done in SNF are in [place] EPIC. Please refer to them using EPIC/Care Everywhere.    DISCHARGE PLAN:    Follow up labs: No labs orders/due    Medical Follow Up:      Follow up with primary care provider in 1-2 weeks    Discharge Services: No therapy or home care recommended.       TOTAL DISCHARGE TIME:   Greater than 30 minutes  Electronically signed by:  [place]ette " ALLEN Oleary [facility]  Phone: [phone]

## 2022-04-01 DIAGNOSIS — F41.9 ANXIETY: ICD-10-CM

## 2022-04-01 RX ORDER — LORAZEPAM 2 MG/1
2 TABLET ORAL
Qty: 30 TABLET | Refills: 0 | Status: SHIPPED | OUTPATIENT
Start: 2022-04-01

## 2022-04-12 NOTE — ED PROVIDER NOTES
NAME: Perry Betancourt  AGE: 76 year old male  YOB: 1945  MRN: [id number]  EVALUATION DATE & TIME: 3/8/2022  1:36 AM     PCP: Mickey Barclay     ED PROVIDER: Luis Lujan M.D.         Chief Complaint   Patient presents with     Fall     Head Laceration      FINAL IMPRESSION:  1. Fall, initial encounter    2. Head injury, initial encounter    3. Scalp laceration, initial encounter    4. Pleural effusion on left    5. Subdural hygroma    6. Subdural hygroma       MEDICAL DECISION MAKIN:41 AM I met with the patient, obtained history, performed an initial exam, and discussed options and plan for diagnostics and treatment here in the ED. PPE: N95 mask and gloves  2:48 AM I rechecked and updated the patient.   3:16 AM I spoke with [facility] regarding patient.   3:55 AM The patient's laceration was stapled without complication. Discussed plans for discharge and patient was agreeable.  3/31/22 Patient demographics corrected after chart verified.      Patient was clinically assessed and consented to treatment. After assessment, medical decision making and workup were discussed with the patient. The patient was agreeable to plan for testing, workup, and treatment.  Pertinent Labs & Imaging studies reviewed. (See chart for details)  Perry Betancourt is a 76 year old male who presents with fall and head injury.   Differential diagnosis includes but not limited to intracranial hemorrhage, skull fracture, cephalhematoma, scalp laceration, mechanical fall.  Patient with mechanical fall night and is on Xarelto per his chart review for control atrial flutter.  Patient did not lose consciousness nor was a syncopal episode.  He denies any pain except from the top of his head where he has a wound.  Patient does have a contusion with laceration on top of the scalp where he must of struck the cabinet.  No evidence of galea and wound will be cleansed and plan for repair.  Due to the blood thinners  patient will be sent for CT scan of the head and neck.  These are pending at this time and will plan to repair wound.  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  CT scan of the neck showed no fractures but did show a incidental finding of a left-sided pleural effusion.  Will get chest x-ray to better define this however patient has no tenderness on the chest nor any shortness of breath.  If this was hemothorax and acutely related to trauma and expect tenderness on that rib area and acute shortness of breath however patient does not have either.  Chest x-ray did show significant effusion on the left side.  He is not tender there and as documented lung sounds were diminished over there however likely more significant than exam shows.  He does report some decreased energy however this is significantly changed from chest x-ray at outside clinic on September 25.  Given his smoking history and COPD lung cancer and a metastatic effusion is a concern.  Additionally this could be infectious and the labs were obtained.  CBC did show slight leukocytosis otherwise expect with that much effusion white count would be higher.  Coagulation studies were slightly elevated consistent with the Xarelto and metabolic panel unremarkable.  COVID-19 was negative and will plan for admission for likely thoracentesis and work-up of incidental pleural effusion finding.  Patient discussed with hospitalist, Dr. Hernandez for admission.     0 minutes of critical care time     MEDICATIONS GIVEN IN THE EMERGENCY:  Medications   bacitracin ointment (0.9 g Topical Given 3/8/22 0250)   lidocaine/EPINEPHrine/tetracaine (LET) solution KIT 3 mL (3 mLs Topical Given 3/8/22 0250)   bacitracin ointment (0.9 g Topical Given 3/8/22 0404)          NEW PRESCRIPTIONS STARTED AT TODAY'S ER VISIT:       Current Discharge Medication List            START taking these medications     Details   acetaminophen (TYLENOL) 500 MG tablet Take 2 tablets (1,000 mg) by mouth every 8 hours as needed for mild pain     Associated Diagnoses: Fall, initial encounter       amLODIPine (NORVASC) 5 MG tablet Take 1 tablet (5 mg) by mouth daily  Qty: 30 tablet, Refills: 0     Associated Diagnoses: Hypertension, unspecified type       amoxicillin-clavulanate (AUGMENTIN) 875-125 MG tablet Take 1 tablet by mouth 2 times daily for 14 days  Qty: 28 tablet, Refills: 0     Associated Diagnoses: Empyema of left pleural space (H)       atenolol (TENORMIN) 25 MG tablet Take 1 tablet (25 mg) by mouth daily  Qty: 30 tablet, Refills: 0     Associated Diagnoses: Hypertension, unspecified type       LORazepam (ATIVAN) 2 MG tablet Take 1 tablet (2 mg) by mouth daily at 4pm  Qty: 60 tablet, Refills: 0     Associated Diagnoses: Anxiety       sertraline (ZOLOFT) 25 MG tablet Take 1 tablet (25 mg) by mouth daily  Qty: 30 tablet, Refills: 0     Associated Diagnoses: Anxiety                    =================================================================     HPI     Patient information was obtained from: Patient     Use of : N/A         Perry Betancourt is a 76 year old male with a past medical history of CVA and atrial fibrillation with RVR, who presents to the ED via EMS for evaluation of a head laceration secondary to a fall.      Per immunization records, patient's last tetanus shot was in 2016.     Patient presents from a group home with complaints of a head laceration. He states he was trying to go to the bathroom when he fell, sustaining a laceration to the top of his head. Per triage note, EMS reported no anticoagulants but Xarelto is listed on his medication list. He denies loss of consciousness. Denies any other pain or complaints at this time.     REVIEW OF SYSTEMS   Review  of Systems   Constitutional:        Positive for a fall   Skin: Positive for wound (head laceration).   All other systems reviewed and are negative.     PAST MEDICAL HISTORY:  Past Medical History   No past medical history on file.        PAST SURGICAL HISTORY:  Past Surgical History         Past Surgical History:   Procedure Laterality Date     THORACOSCOPY Left 3/11/2022     Procedure: LEFT THORACOSCOPY, CONVERTED TO;  Surgeon: Perry Martell MD;  Location: [facility] OR     THORACOTOMY Left 3/11/2022     Procedure: LEFT THORACOTOMY WITH DECORTICATION;  Surgeon: Perry Martell MD;  Location: [facility] OR            CURRENT MEDICATIONS:    No current facility-administered medications for this encounter.     Current Outpatient Medications:      acetaminophen (TYLENOL) 500 MG tablet, Take 2 tablets (1,000 mg) by mouth every 8 hours as needed for mild pain, Disp: , Rfl:      amLODIPine (NORVASC) 5 MG tablet, Take 1 tablet (5 mg) by mouth daily, Disp: 30 tablet, Rfl: 0     amoxicillin-clavulanate (AUGMENTIN) 875-125 MG tablet, Take 1 tablet by mouth 2 times daily for 14 days, Disp: 28 tablet, Rfl: 0     atenolol (TENORMIN) 25 MG tablet, Take 1 tablet (25 mg) by mouth daily, Disp: 30 tablet, Rfl: 0     LORazepam (ATIVAN) 2 MG tablet, Take 1 tablet (2 mg) by mouth daily at 4pm, Disp: 60 tablet, Rfl: 0     sertraline (ZOLOFT) 25 MG tablet, Take 1 tablet (25 mg) by mouth daily, Disp: 30 tablet, Rfl: 0     ALLERGIES:  No Known Allergies     FAMILY HISTORY:  Family History   No family history on file.        SOCIAL HISTORY:   Social History            Socioeconomic History     Marital status: Single       Spouse name: Not on file     Number of children: Not on file     Years of education: Not on file     Highest education level: Not on file   Occupational History     Not on file   Tobacco Use     Smoking status: Former Smoker       Types: Cigarettes       Quit date: 2014       Years since quittin.1      Smokeless tobacco: Never Used   Substance and Sexual Activity     Alcohol use: Not Currently       Comment: Quit around 2008     Drug use: Not on file     Sexual activity: Not on file   Other Topics Concern     Parent/sibling w/ CABG, MI or angioplasty before 65F 55M? Not Asked   Social History Narrative     Not on file      Social Determinants of Health      Financial Resource Strain: Not on file   Food Insecurity: Not on file   Transportation Needs: Not on file   Physical Activity: Not on file   Stress: Not on file   Social Connections: Not on file   Intimate Partner Violence: Not on file   Housing Stability: Not on file         PHYSICAL EXAM:    Vitals: /61   Pulse 85   Temp 99.1  F (37.3  C) (Oral)   Resp 16   Wt 76.2 kg (168 lb)   SpO2 93%    Physical Exam  Vitals and nursing note reviewed.   Constitutional:       General: He is not in acute distress.     Appearance: Normal appearance. He is normal weight. He is not ill-appearing or toxic-appearing.   HENT:      Head: Normocephalic. Contusion and laceration present.        Nose: Nose normal.      Mouth/Throat:      Mouth: Mucous membranes are moist.      Pharynx: Oropharynx is clear.   Eyes:      Extraocular Movements: Extraocular movements intact.      Pupils: Pupils are equal, round, and reactive to light.   Cardiovascular:      Rate and Rhythm: Normal rate and regular rhythm.      Heart sounds: Normal heart sounds.   Pulmonary:      Effort: Pulmonary effort is normal. No tachypnea, accessory muscle usage, prolonged expiration or respiratory distress.      Breath sounds: Examination of the left-middle field reveals decreased breath sounds. Examination of the left-lower field reveals decreased breath sounds. Decreased breath sounds present. No wheezing, rhonchi or rales.   Chest:      Chest wall: No tenderness.   Abdominal:      Palpations: Abdomen is soft.      Tenderness: There is no abdominal tenderness.   Musculoskeletal:         General: No  signs of injury.      Cervical back: Normal range of motion and neck supple. No tenderness.   Skin:     General: Skin is warm and dry.      Capillary Refill: Capillary refill takes less than 2 seconds.      Coloration: Skin is not pale.      Findings: No erythema.   Neurological:      Mental Status: He is alert and oriented to person, place, and time. Mental status is at baseline.      Cranial Nerves: No cranial nerve deficit.      Coordination: Coordination normal.   Psychiatric:         Mood and Affect: Mood normal.         LAB:  All pertinent labs reviewed and interpreted.        Labs Ordered and Resulted from Time of ED Arrival to Time of ED Departure   INR - Abnormal       Result Value      INR 1.33 (*)     COMPREHENSIVE METABOLIC PANEL - Abnormal     Sodium 137        Potassium 3.9        Chloride 105        Carbon Dioxide (CO2) 20 (*)       Anion Gap 12        Urea Nitrogen 9        Creatinine 0.68 (*)       Calcium 8.6        Glucose 129 (*)       Alkaline Phosphatase 94        AST 46 (*)       ALT 38        Protein Total 7.7        Albumin 1.7 (*)       Bilirubin Total 0.5        GFR Estimate >90      CBC WITH PLATELETS AND DIFFERENTIAL - Abnormal     WBC Count 11.1 (*)       RBC Count 3.46 (*)       Hemoglobin 8.0 (*)       Hematocrit 27.7 (*)       MCV 80        MCH 23.1 (*)       MCHC 28.9 (*)       RDW 17.9 (*)       Platelet Count 452 (*)       % Neutrophils 84        % Lymphocytes 8        % Monocytes 7        % Eosinophils 0        % Basophils 0        % Immature Granulocytes 1        NRBCs per 100 WBC 0        Absolute Neutrophils 9.4 (*)       Absolute Lymphocytes 0.9        Absolute Monocytes 0.8        Absolute Eosinophils 0.0        Absolute Basophils 0.0        Absolute Immature Granulocytes 0.1        Absolute NRBCs 0.0      PARTIAL THROMBOPLASTIN TIME - Normal     aPTT 32      PROCALCITONIN - Normal     Procalcitonin 0.11      COVID-19 VIRUS (CORONAVIRUS) BY PCR - Normal     SARS CoV2 PCR  Negative            RADIOLOGY:  XR Chest Port 1 View   Final Result   IMPRESSION: Moderate-marked left-sided pleural fluid collection with   minimal to moderate amount of remaining aerated lung in the left   perihilar region. Atelectasis involving the remaining portions of the   lung. Right lung clear. Heart size is unable to be assessed due to   pleural fluid. Normal pulmonary vascularity.       CT Cervical Spine w/o Contrast   Final Result   IMPRESSION:   1.  No fracture or posttraumatic subluxation.   2.  Degenerative changes, as above.   3.  Pleural fluid filling the left lung apex, incompletely imaged.       CT Head w/o Contrast   Final Result   IMPRESSION:   1.  No definite acute traumatic intracranial process. Question   subdural hygroma measuring up to 0.9 cm versus prominence of the   subarachnoid space overlying the left parietal lobe.   2.  Age-indeterminate right posterior limb of internal capsule lacunar   type infarction. If the patient is experiencing an acute, focal or   ongoing neurologic deficit, an MRI may be indicated.   3.  Chronic senescent and presumed microvascular ischemic changes, as   above.       Dr. Cristian Hussein discussed results with DR. JAKUB OROURKE on 03/08/2022   at 3:16 AM.             EKG:   None     PROCEDURES:   Procedures      PROCEDURE: Laceration Repair   INDICATIONS: Laceration   PROCEDURE PROVIDER: Dr Jose Alejandro Orourke   SITE:  Scalp   TYPE/SIZE: simple, clean and no foreign body visualized  4 cm (total length)   FUNCTIONAL ASSESSMENT: Distal sensation, circulation and motor intact   MEDICATION: 3 mLs of 1% Lidocaine with epinephrine   PREPARATION: irrigation with Normal saline   DEBRIDEMENT: no debridement   CLOSURE:  Wound was closed in   4 staples     Total number of sutures/staples placed: 4         Jaqueline FREITAS, am serving as a scribe to document services personally performed by Dr. Luis Orourke  based on my observation and the provider's statements to me. I,  Luis Lujan MD attest that Jaqueline Whitney is acting in a scribe capacity, has observed my performance of the services and has documented them in accordance with my direction.        Luis Lujan M.D.  Emergency Medicine  [facility] Emergency Department     Luis Lujan MD  03/08/22 0657        Luis Lujan MD  03/31/22 [id number]       Luis Lujan MD  04/12/22 0258

## 2023-02-17 NOTE — SIGNIFICANT EVENT
Significant Event Note    Time of event: 3:43 AM March 9, 2022    Description of event:  Hypotension  Blood pressure 80/42. Denies symptoms. Lab work was unremarkable from admission. No nidus of infection to suggest infection. Currently here after a fall.     Plan:  Suspect hypotension incidental, likely related to sleeping. Start with small 500 ml fluid bolus to see if responsive. Other vital signs are all within normal limits.     Discussed with: bedside nurse    Duran Lazo MD  [facility] Resident  Pager# [phone]       You can access the FollowMyHealth Patient Portal offered by [facility] by registering at the following website: [email or web address]. By joining [facility]’s FollowMyHealth portal, you will also be able to view your health information using other applications (apps) compatible with our system.

## 2024-04-03 NOTE — PROGRESS NOTES
Care Management Follow Up    Length of Stay (days): 10    Expected Discharge Date: 03/18/2022     Concerns to be Addressed:    Level II assessment, TCU placement   Patient plan of care discussed at interdisciplinary rounds: Yes    Anticipated Discharge Disposition: Transitional Care     Anticipated Discharge Services:  (skilled nursing, therapy services)  Anticipated Discharge DME:  (per therapy, if indicated)    Patient/family educated on Medicare website which has current facility and service quality ratings:  YES  Education Provided on the Discharge Plan:  Per care team  Patient/Family in Agreement with the Plan: yes    Referrals Placed by CM/SW: Post Acute Facilities  Private pay costs discussed: Not applicable    Additional Information:  Chart review completed and updates with care team done.    Pre Admission Screening (PAS) for discharge planning completed yesterday, 3/17, triggered a level II review to be done by [facility] before admission to TCU.    Received a call from Nelda Bowden [facility]  ([phone]).  Writer provided information requested.  Nelda will call writer back once she has spoken with patient's legal guardian.    Received call back from Carlos Enrique Bowden ([phone]) with update that patient's Level II review has been completed.  She states that [facility]    Received message from Jose Alejandro at [facility], she is reviewing patient referral and will call back. - patient was declined.    Left voice message to [facility] requesting call back to [phone]    [facility] - declined, no bed available    Referral sent to Henok maravilla Parrish ([facility]) - olivia Moran at [facility] accepted patient for Monday, 3/21/22 (no bed available for the weekend).  She would like CM to provide updates on Monday prior to discharge.  She would like patient  transport around 11:00 AM.    Writer called [facility] , Nelda, to [facility]  Updated via voice message of discharge destination will now be [place] and that they will need copy of Level II review.  CM to confirm with Laura on Monday, 3/21.  CM to update [facility] with final discharge destination.  CM to update brother and discuss transportation plan.  CM to update group home of discharge destination.      [facility] RN       No

## (undated) DEVICE — SU VICRYL 3-0 MH 27" J322H

## (undated) DEVICE — SYSTEM CLEARIFY VISUALIZATION 21-345

## (undated) DEVICE — MAT INST MAGNETIC 16X20

## (undated) DEVICE — ESU LIGASURE MARYLAND LAPAROSCOPIC SLR/DVDR 5MMX37CM LF1937

## (undated) DEVICE — DRAPE IOBAN INCISE 23X17" 6650EZ

## (undated) DEVICE — SUTURE SILK 0 PSL 580H

## (undated) DEVICE — Device

## (undated) DEVICE — SUTURE VICRYL+ 3-0 18IN PS-2 UND VCP497H

## (undated) DEVICE — TUBING IRRIG TUR Y TYPE 96" LF 6543-01

## (undated) DEVICE — SU VICRYL+ 3-0 27IN SH UND VCP416H

## (undated) DEVICE — SU VICRYL 2 TP-1 4X27" J649G

## (undated) DEVICE — CONNECTOR 5 TO 1 STRL 271502

## (undated) DEVICE — SUTURE SILK 0 TIES 30IN SA86G

## (undated) DEVICE — SOL RINGERS LACTATED 1000ML BAG 2B2324X

## (undated) DEVICE — GLOVE UNDER INDICATOR PI SZ 7.0 LF 41670

## (undated) DEVICE — STPL ENDO LINEAR CUT ECHELON 60MMX28CM SHORT SC60A

## (undated) DEVICE — SU PROLENE 1-0 TP-1  30" BLUE 8824G

## (undated) DEVICE — SOL NACL 0.9% IRRIG 1000ML BOTTLE 2F7124

## (undated) DEVICE — TUBING LAP/SUCT IRRIG DAVOL 0026880

## (undated) DEVICE — SU VICRYL+ 0 27IN CT-2 UND VCP270H

## (undated) DEVICE — SUCTION DRY CHEST DRAIN OASIS 3600-100

## (undated) DEVICE — ENDO TROCAR SHIELDED BLADED KII Z-THRD 11X100MM CTB33

## (undated) DEVICE — TUBING SMOKE EVAC PNEUMOCLEAR HIGH FLOW 0620050250

## (undated) DEVICE — TUBING SUCTION DRAINAGE PLEURAL DUAL 8884714200

## (undated) DEVICE — TUBING SUCTION MEDI-VAC 1/4"X20' N620A - HE

## (undated) DEVICE — HLSTR JET MULTI-INST SFTY STRL FIRE-SFE LF 7212

## (undated) DEVICE — GOWN IMPERVIOUS BREATHABLE SMART LG 89015

## (undated) DEVICE — PREP CHLORAPREP 26ML TINTED HI-LITE ORANGE 930815

## (undated) DEVICE — SUCTION TIP POOLE STERILE 35040

## (undated) DEVICE — SUTURE VICRYL+ 2 27IN CP UNDYED VCP195H

## (undated) DEVICE — PLATE GROUNDING ADULT W/CORD 9165L

## (undated) DEVICE — DRSG STERI STRIP 1/2X4" R1547

## (undated) DEVICE — ESU PENCIL SMOKE EVAC W/ROCKER SWITCH 0703-047-000

## (undated) DEVICE — ESU CORD MONOPOLAR 10'  E0510

## (undated) DEVICE — DRSG DRAIN 4X4" 7086

## (undated) DEVICE — DRSG GAUZE 4X4" 3033

## (undated) DEVICE — GLOVE BIOGEL PI ULTRATOUCH G SZ 7.5 42175

## (undated) DEVICE — GLOVE BIOGEL PI SZ 8.0 40880

## (undated) DEVICE — ENDO TROCAR SLEEVE KII Z-THREADED 11X100MM CTS12

## (undated) DEVICE — SOL WATER IRRIG 1000ML BOTTLE 2F7114

## (undated) DEVICE — SPONGE LAP 18X18" X8435

## (undated) DEVICE — CUSTOM PACK LAP CHOLE SBA5BLCHEA

## (undated) RX ORDER — BACITRACIN ZINC 500 [USP'U]/G
OINTMENT TOPICAL
Status: DISPENSED
Start: 2022-03-11

## (undated) RX ORDER — PROPOFOL 10 MG/ML
INJECTION, EMULSION INTRAVENOUS
Status: DISPENSED
Start: 2022-03-11

## (undated) RX ORDER — BUPIVACAINE HYDROCHLORIDE 5 MG/ML
INJECTION, SOLUTION PERINEURAL
Status: DISPENSED
Start: 2022-03-11

## (undated) RX ORDER — DEXAMETHASONE SODIUM PHOSPHATE 10 MG/ML
INJECTION INTRAMUSCULAR; INTRAVENOUS
Status: DISPENSED
Start: 2022-03-11

## (undated) RX ORDER — FENTANYL CITRATE-0.9 % NACL/PF 10 MCG/ML
PLASTIC BAG, INJECTION (ML) INTRAVENOUS
Status: DISPENSED
Start: 2022-03-11

## (undated) RX ORDER — FENTANYL CITRATE 50 UG/ML
INJECTION, SOLUTION INTRAMUSCULAR; INTRAVENOUS
Status: DISPENSED
Start: 2022-03-11

## (undated) RX ORDER — EPHEDRINE SULFATE 50 MG/ML
INJECTION, SOLUTION INTRAMUSCULAR; INTRAVENOUS; SUBCUTANEOUS
Status: DISPENSED
Start: 2022-03-11